# Patient Record
Sex: MALE | Race: WHITE | Employment: OTHER | URBAN - METROPOLITAN AREA
[De-identification: names, ages, dates, MRNs, and addresses within clinical notes are randomized per-mention and may not be internally consistent; named-entity substitution may affect disease eponyms.]

---

## 2018-06-20 PROBLEM — S32.010A CLOSED COMPRESSION FRACTURE OF L1 LUMBAR VERTEBRA: Status: ACTIVE | Noted: 2018-06-20

## 2018-06-25 PROBLEM — F10.10 ALCOHOL ABUSE: Chronic | Status: ACTIVE | Noted: 2018-06-25

## 2018-06-25 PROBLEM — Z72.0 TOBACCO ABUSE: Chronic | Status: ACTIVE | Noted: 2018-06-25

## 2018-06-25 PROBLEM — J44.9 COPD (CHRONIC OBSTRUCTIVE PULMONARY DISEASE) (HCC): Chronic | Status: ACTIVE | Noted: 2018-06-25

## 2018-06-25 PROBLEM — A49.02 MRSA INFECTION: Status: ACTIVE | Noted: 2018-06-25

## 2018-06-25 PROBLEM — D61.818 PANCYTOPENIA (HCC): Status: ACTIVE | Noted: 2018-06-25

## 2018-07-11 ENCOUNTER — HOSPITAL ENCOUNTER (OUTPATIENT)
Dept: CT IMAGING | Age: 68
Discharge: HOME | End: 2018-07-11

## 2018-07-11 DIAGNOSIS — R93.5 ABNORMAL US (ULTRASOUND) OF ABDOMEN: ICD-10-CM

## 2018-07-11 DIAGNOSIS — R93.5 ABNORMAL FINDINGS ON DIAGNOSTIC IMAGING OF OTHER ABDOMINAL REGIONS, INCLUDING RETROPERITONEUM: ICD-10-CM

## 2018-07-25 ENCOUNTER — OFFICE VISIT (OUTPATIENT)
Dept: FAMILY MEDICINE CLINIC | Age: 68
End: 2018-07-25

## 2018-07-25 VITALS
SYSTOLIC BLOOD PRESSURE: 116 MMHG | WEIGHT: 137 LBS | DIASTOLIC BLOOD PRESSURE: 72 MMHG | HEIGHT: 75 IN | BODY MASS INDEX: 17.03 KG/M2

## 2018-07-25 DIAGNOSIS — F10.10 ALCOHOL ABUSE: Primary | Chronic | ICD-10-CM

## 2018-07-25 DIAGNOSIS — J43.1 PANLOBULAR EMPHYSEMA (HCC): Chronic | ICD-10-CM

## 2018-07-25 DIAGNOSIS — S32.010A CLOSED COMPRESSION FRACTURE OF FIRST LUMBAR VERTEBRA, INITIAL ENCOUNTER: ICD-10-CM

## 2018-07-25 PROCEDURE — 99203 OFFICE O/P NEW LOW 30 MIN: CPT | Performed by: INTERNAL MEDICINE

## 2018-07-25 ASSESSMENT — ENCOUNTER SYMPTOMS
SHORTNESS OF BREATH: 0
SORE THROAT: 0
WHEEZING: 0
DIARRHEA: 0
SINUS PRESSURE: 0
SINUS PAIN: 0
RHINORRHEA: 0
BACK PAIN: 1
CONSTIPATION: 0
NAUSEA: 0
BLOOD IN STOOL: 0
VOMITING: 0
COUGH: 0
ABDOMINAL PAIN: 0
APNEA: 0

## 2018-07-25 ASSESSMENT — PATIENT HEALTH QUESTIONNAIRE - PHQ9
1. LITTLE INTEREST OR PLEASURE IN DOING THINGS: 0
2. FEELING DOWN, DEPRESSED OR HOPELESS: 0
SUM OF ALL RESPONSES TO PHQ9 QUESTIONS 1 & 2: 0
SUM OF ALL RESPONSES TO PHQ QUESTIONS 1-9: 0

## 2018-07-25 NOTE — PATIENT INSTRUCTIONS
Thank you for choosing [facility]. Please bring a current list of medications to every appointment. Please contact your pharmacy for any prescription refill(s) that you are requesting. Cigarette Smoking and Its Health Risks   GENERAL INFORMATION:   Smoking and your health: Cigarette smoking is the most preventable cause of illness and death in the [place]. A large number of Americans smoke cigarettes, and each year more than one million children and adults start smoking cigarettes. Many people die every year from illnesses caused by smoking. People who smoke die earlier than those who do not smoke. The risk of disease increases if you smoke a lot, inhale deeply, or have smoked many years. Why are cigarettes bad for you? Cigarettes are filled with poison that goes into the lungs when you inhale. Coughing, dizziness, and burning of the eyes, nose, and throat are early signs that smoking is harming you. Smoking increases your health risks if you have diabetes, high blood pressure, or high blood cholesterol. The long-term problems of smoking cigarettes are the following:   Cancer: Smoking increases your chances of getting cancer. Cigarette smoking may play a role in developing many kinds of cancer. Lung cancer is the most common kind of cancer caused by smoking. A smoker is at greater risk of getting cancer of the lips, mouth, throat, or voice box. Smokers also have a higher risk of getting esophagus, stomach, kidney, pancreas, cervix, bladder, and skin cancer. Heart and blood vessel disease: If you already have heart or blood vessel problems and smoke, you are at even greater risk of having continued or worse health problems. The nicotine in the tobacco causes an increase in your heart rate and blood pressure. The arteries (blood vessels) in your arms and legs tighten and narrow because of the nicotine in cigarette smoke.  Cigarette smoke increases blood clotting, and may damage the lining of your heart's arteries and other blood vessels. Carbon monoxide is a harmful gas that gets into the blood and decreases oxygen going to the heart and the body. Cigarette smoke contains this gas. Hardening of the arteries happens more often in smokers than in nonsmokers. This may make it more likely for you to have a stroke (blood clot in your brain). The more cigarettes you smoke, the greater your risk of a heart attack. Lung disease: The younger you are when you start smoking, the greater your risk of getting lung diseases. Many smokers have a cough which is caused by the chemicals in smoke. These chemicals harm the cilia (tiny hairs) that line the lungs and help remove dirt and waste products. Depending upon how much you smoke, your lungs become gray and \"dirty\" (they look like charcoal). Healthy lungs are pink. Chronic bronchitis is a serious lung infection which is often caused by smoking. Emphysema is a long-term lung disease that may be caused by smoking cigarettes. Cigarette smoking also makes asthma worse. You are at a higher risk of getting colds, pneumonia, and other lung infections if you smoke. Gastrointestinal disease: Cigarette smoking increases the amount of acid that is made by your stomach, and may cause a peptic ulcer. A peptic ulcer is an open sore in the stomach or duodenum (part of the intestine). You may also get gastroesophageal reflux from smoking. This is when you have a backflow of stomach acid into your esophagus (food tube). Other problems: The following are other problems that smoking may cause: Bad breath. Bad smell in your clothes, hair, and skin. Decreased ability to play sports or do physical activities because of breathing problems. Earlier than normal wrinkling of the skin, usually the face. Higher risk of bone fractures, such as hip, wrist, or spine. Higher risk of starting a fire.  This may happen if you fall asleep with a lit chance of getting cancer will be reduced as compared to a person who does not quit. As a former smoker, you will live longer than people who continue to smoke. Women who quit smoking before getting pregnant have a better chance of having a healthy baby. You will decrease the health risks of nonsmokers if you stop smoking. By stopping smoking you will also save money. What is the best way to stop smoking? A large percentage of people have tried to quit smoking at least once. Most people who try to quit smoking go through a series of stages. Following are the stages you may go through to stop smoking: Thinking about quitting. Deciding to quit on a certain day. Quitting smoking. Successfully staying an ex-smoker. You must be strong in order to quit smoking. When you decide to quit, you can get help from your caregiver or others. You will learn that there are many ways to stop smoking. Talk to your caregiver about the best method for you when you are ready to quit smoking. Ask your caregiver for more information about how to stop smoking. Call or write the following for more information about the risks of smoking. Smokefree. gov  Phone: 7-638.730.8102  Web Address: www.smokefree. gov  American Lung Association  [facility]. [address]  Phone: 3-5-919-3-608.895.8992  Phone: 0-3-920--301-1321  Web Address: [employer]. [address] [place] [address]  Phone: 3-831.217.7294  Web Address: http://[employer]/. gov   CARE AGREEMENT:   You have the right to help plan your care. To help with this plan, you must learn about your health condition and how it may be treated. You can then discuss treatment options with your caregivers. Work with them to decide what care may be used to treat you. You always have the right to refuse treatment. Copyright © 2009. [employer] All rights reserved.  Information is for End User's use only and may not be sold, redistributed or

## 2018-08-21 ENCOUNTER — TELEPHONE (OUTPATIENT)
Dept: FAMILY MEDICINE CLINIC | Age: 68
End: 2018-08-21

## 2018-08-21 RX ORDER — SODIUM CHLORIDE 1000 MG
1 TABLET, SOLUBLE MISCELLANEOUS DAILY
Qty: 30 TABLET | Refills: 5 | Status: SHIPPED | OUTPATIENT
Start: 2018-08-21 | End: 2019-03-02

## 2018-08-21 RX ORDER — MAGNESIUM OXIDE 400 MG/1
400 TABLET ORAL 2 TIMES DAILY
Qty: 60 TABLET | Refills: 5 | Status: SHIPPED | OUTPATIENT
Start: 2018-08-21 | End: 2019-03-02

## 2018-08-21 RX ORDER — UBIDECARENONE 75 MG
CAPSULE ORAL
Qty: 15 TABLET | Refills: 5 | Status: SHIPPED | OUTPATIENT
Start: 2018-08-21 | End: 2019-03-02

## 2018-08-21 RX ORDER — THIAMINE MONONITRATE (VIT B1) 100 MG
100 TABLET ORAL DAILY
Qty: 30 TABLET | Refills: 5 | Status: SHIPPED | OUTPATIENT
Start: 2018-08-21 | End: 2019-03-02

## 2018-08-28 PROCEDURE — G0180 MD CERTIFICATION HHA PATIENT: HCPCS | Performed by: INTERNAL MEDICINE

## 2018-08-30 ENCOUNTER — TELEPHONE (OUTPATIENT)
Dept: FAMILY MEDICINE CLINIC | Age: 68
End: 2018-08-30

## 2018-08-30 DIAGNOSIS — J43.1 PANLOBULAR EMPHYSEMA (HCC): Primary | Chronic | ICD-10-CM

## 2019-01-01 ENCOUNTER — APPOINTMENT (OUTPATIENT)
Dept: MRI IMAGING | Age: 69
End: 2019-01-01
Payer: MEDICARE

## 2019-01-01 ENCOUNTER — APPOINTMENT (OUTPATIENT)
Dept: GENERAL RADIOLOGY | Age: 69
End: 2019-01-01
Payer: MEDICARE

## 2019-01-01 ENCOUNTER — APPOINTMENT (OUTPATIENT)
Dept: GENERAL RADIOLOGY | Age: 69
DRG: 897 | End: 2019-01-01
Payer: MEDICARE

## 2019-01-01 ENCOUNTER — CARE COORDINATION (OUTPATIENT)
Dept: CASE MANAGEMENT | Age: 69
End: 2019-01-01

## 2019-01-01 ENCOUNTER — APPOINTMENT (OUTPATIENT)
Dept: CT IMAGING | Age: 69
End: 2019-01-01
Payer: MEDICARE

## 2019-01-01 ENCOUNTER — APPOINTMENT (OUTPATIENT)
Dept: CT IMAGING | Age: 69
DRG: 897 | End: 2019-01-01
Payer: MEDICARE

## 2019-01-01 ENCOUNTER — HOSPITAL ENCOUNTER (INPATIENT)
Age: 69
LOS: 6 days | Discharge: NURSING, REHAB OR LONG-TERM CARE | DRG: 897 | End: 2019-07-05
Attending: EMERGENCY MEDICINE | Admitting: SPECIALIST
Payer: MEDICARE

## 2019-01-01 ENCOUNTER — HOSPITAL ENCOUNTER (OUTPATIENT)
Age: 69
Setting detail: OBSERVATION
Discharge: HOME | End: 2019-12-23
Attending: EMERGENCY MEDICINE | Admitting: INTERNAL MEDICINE
Payer: MEDICARE

## 2019-01-01 VITALS
HEART RATE: 81 BPM | SYSTOLIC BLOOD PRESSURE: 115 MMHG | BODY MASS INDEX: 20.06 KG/M2 | HEIGHT: 74 IN | WEIGHT: 156.31 LBS | DIASTOLIC BLOOD PRESSURE: 75 MMHG | OXYGEN SATURATION: 96 % | TEMPERATURE: 98.4 F | RESPIRATION RATE: 16 BRPM

## 2019-01-01 VITALS
DIASTOLIC BLOOD PRESSURE: 86 MMHG | HEIGHT: 75 IN | HEART RATE: 81 BPM | TEMPERATURE: 98.9 F | BODY MASS INDEX: 19.19 KG/M2 | RESPIRATION RATE: 16 BRPM | SYSTOLIC BLOOD PRESSURE: 128 MMHG | WEIGHT: 154.32 LBS | OXYGEN SATURATION: 95 %

## 2019-01-01 DIAGNOSIS — E83.42 HYPOMAGNESEMIA: ICD-10-CM

## 2019-01-01 DIAGNOSIS — F10.10 ALCOHOL ABUSE: ICD-10-CM

## 2019-01-01 DIAGNOSIS — B37.2 CANDIDAL DERMATITIS: ICD-10-CM

## 2019-01-01 DIAGNOSIS — F10.931 ACUTE HYPERACTIVE ALCOHOL WITHDRAWAL DELIRIUM (HCC): ICD-10-CM

## 2019-01-01 DIAGNOSIS — R41.82 ALTERED MENTAL STATUS, UNSPECIFIED ALTERED MENTAL STATUS TYPE: Primary | ICD-10-CM

## 2019-01-01 DIAGNOSIS — E87.6 HYPOKALEMIA: ICD-10-CM

## 2019-01-01 LAB
A/G RATIO: 0.9 (ref 1.1–2.2)
A/G RATIO: 1 (ref 1.1–2.2)
A/G RATIO: 1 (ref 1.1–2.2)
ALBUMIN SERPL-MCNC: 3.4 G/DL (ref 3.4–5)
ALBUMIN SERPL-MCNC: 3.5 G/DL (ref 3.4–5)
ALBUMIN SERPL-MCNC: 3.7 G/DL (ref 3.4–5)
ALBUMIN SERPL-MCNC: 3.8 G/DL (ref 3.4–5)
ALP BLD-CCNC: 137 U/L (ref 40–129)
ALP BLD-CCNC: 138 U/L (ref 40–129)
ALP BLD-CCNC: 146 U/L (ref 40–129)
ALP BLD-CCNC: 149 U/L (ref 40–129)
ALT SERPL-CCNC: 15 U/L (ref 10–40)
ALT SERPL-CCNC: 17 U/L (ref 10–40)
ALT SERPL-CCNC: 24 U/L (ref 10–40)
ALT SERPL-CCNC: 31 U/L (ref 10–40)
AMMONIA: 26 UMOL/L (ref 16–60)
AMMONIA: 29 UMOL/L (ref 16–60)
AMPHETAMINE SCREEN, URINE: NORMAL
AMYLASE: 27 U/L (ref 25–115)
ANION GAP SERPL CALCULATED.3IONS-SCNC: 10 MMOL/L (ref 3–16)
ANION GAP SERPL CALCULATED.3IONS-SCNC: 10 MMOL/L (ref 3–16)
ANION GAP SERPL CALCULATED.3IONS-SCNC: 11 MMOL/L (ref 3–16)
ANION GAP SERPL CALCULATED.3IONS-SCNC: 13 MMOL/L (ref 3–16)
ANION GAP SERPL CALCULATED.3IONS-SCNC: 16 MMOL/L (ref 3–16)
ANION GAP SERPL CALCULATED.3IONS-SCNC: 18 MMOL/L (ref 3–16)
ANION GAP SERPL CALCULATED.3IONS-SCNC: 18 MMOL/L (ref 3–16)
APTT: 30.4 SEC (ref 24.2–36.2)
AST SERPL-CCNC: 29 U/L (ref 15–37)
AST SERPL-CCNC: 32 U/L (ref 15–37)
AST SERPL-CCNC: 51 U/L (ref 15–37)
AST SERPL-CCNC: 62 U/L (ref 15–37)
BARBITURATE SCREEN URINE: NORMAL
BASE EXCESS ARTERIAL: 3.7 MMOL/L (ref -3–3)
BASOPHILS ABSOLUTE: 0 K/UL (ref 0–0.2)
BASOPHILS RELATIVE PERCENT: 0.2 %
BASOPHILS RELATIVE PERCENT: 0.7 %
BASOPHILS RELATIVE PERCENT: 0.9 %
BENZODIAZEPINE SCREEN, URINE: NORMAL
BILIRUB SERPL-MCNC: 0.8 MG/DL (ref 0–1)
BILIRUB SERPL-MCNC: 1 MG/DL (ref 0–1)
BILIRUB SERPL-MCNC: 1.1 MG/DL (ref 0–1)
BILIRUB SERPL-MCNC: 1.2 MG/DL (ref 0–1)
BILIRUBIN DIRECT: 0.3 MG/DL (ref 0–0.3)
BILIRUBIN URINE: NEGATIVE
BILIRUBIN, INDIRECT: 0.5 MG/DL (ref 0–1)
BLOOD CULTURE, ROUTINE: NORMAL
BLOOD, URINE: NEGATIVE
BUN BLDV-MCNC: 10 MG/DL (ref 7–20)
BUN BLDV-MCNC: 15 MG/DL (ref 7–20)
BUN BLDV-MCNC: 5 MG/DL (ref 7–20)
BUN BLDV-MCNC: 5 MG/DL (ref 7–20)
BUN BLDV-MCNC: 6 MG/DL (ref 7–20)
BUN BLDV-MCNC: 7 MG/DL (ref 7–20)
BUN BLDV-MCNC: 8 MG/DL (ref 7–20)
CALCIUM SERPL-MCNC: 8.1 MG/DL (ref 8.3–10.6)
CALCIUM SERPL-MCNC: 8.2 MG/DL (ref 8.3–10.6)
CALCIUM SERPL-MCNC: 8.6 MG/DL (ref 8.3–10.6)
CALCIUM SERPL-MCNC: 8.6 MG/DL (ref 8.3–10.6)
CALCIUM SERPL-MCNC: 8.8 MG/DL (ref 8.3–10.6)
CALCIUM SERPL-MCNC: 8.8 MG/DL (ref 8.3–10.6)
CALCIUM SERPL-MCNC: 8.9 MG/DL (ref 8.3–10.6)
CANNABINOID SCREEN URINE: NORMAL
CARBOXYHEMOGLOBIN ARTERIAL: 2.8 % (ref 0–1.5)
CHLORIDE BLD-SCNC: 101 MMOL/L (ref 99–110)
CHLORIDE BLD-SCNC: 101 MMOL/L (ref 99–110)
CHLORIDE BLD-SCNC: 102 MMOL/L (ref 99–110)
CHLORIDE BLD-SCNC: 93 MMOL/L (ref 99–110)
CHLORIDE BLD-SCNC: 95 MMOL/L (ref 99–110)
CHLORIDE BLD-SCNC: 96 MMOL/L (ref 99–110)
CHLORIDE BLD-SCNC: 97 MMOL/L (ref 99–110)
CHLORIDE BLD-SCNC: 98 MMOL/L (ref 99–110)
CHLORIDE BLD-SCNC: 98 MMOL/L (ref 99–110)
CLARITY: CLEAR
CO2: 22 MMOL/L (ref 21–32)
CO2: 23 MMOL/L (ref 21–32)
CO2: 24 MMOL/L (ref 21–32)
CO2: 25 MMOL/L (ref 21–32)
CO2: 26 MMOL/L (ref 21–32)
COCAINE METABOLITE SCREEN URINE: NORMAL
COLOR: YELLOW
CREAT SERPL-MCNC: 0.5 MG/DL (ref 0.8–1.3)
CREAT SERPL-MCNC: 1 MG/DL (ref 0.8–1.3)
CREAT SERPL-MCNC: <0.5 MG/DL (ref 0.8–1.3)
CULTURE, BLOOD 2: NORMAL
EKG ATRIAL RATE: 105 BPM
EKG ATRIAL RATE: 119 BPM
EKG ATRIAL RATE: 96 BPM
EKG DIAGNOSIS: NORMAL
EKG P AXIS: 80 DEGREES
EKG P AXIS: 82 DEGREES
EKG P-R INTERVAL: 178 MS
EKG P-R INTERVAL: 182 MS
EKG Q-T INTERVAL: 304 MS
EKG Q-T INTERVAL: 368 MS
EKG Q-T INTERVAL: 384 MS
EKG QRS DURATION: 72 MS
EKG QRS DURATION: 96 MS
EKG QRS DURATION: 98 MS
EKG QTC CALCULATION (BAZETT): 398 MS
EKG QTC CALCULATION (BAZETT): 485 MS
EKG QTC CALCULATION (BAZETT): 486 MS
EKG R AXIS: -42 DEGREES
EKG R AXIS: -45 DEGREES
EKG R AXIS: 56 DEGREES
EKG T AXIS: 226 DEGREES
EKG T AXIS: 63 DEGREES
EKG T AXIS: 69 DEGREES
EKG VENTRICULAR RATE: 103 BPM
EKG VENTRICULAR RATE: 105 BPM
EKG VENTRICULAR RATE: 96 BPM
EOSINOPHILS ABSOLUTE: 0 K/UL (ref 0–0.6)
EOSINOPHILS RELATIVE PERCENT: 0.2 %
EOSINOPHILS RELATIVE PERCENT: 0.4 %
EOSINOPHILS RELATIVE PERCENT: 1.1 %
ETHANOL: NORMAL MG/DL (ref 0–0.08)
ETHANOL: NORMAL MG/DL (ref 0–0.08)
GFR AFRICAN AMERICAN: >60
GFR NON-AFRICAN AMERICAN: >60
GLOBULIN: 3.3 G/DL
GLOBULIN: 3.4 G/DL
GLOBULIN: 3.9 G/DL
GLUCOSE BLD-MCNC: 100 MG/DL (ref 70–99)
GLUCOSE BLD-MCNC: 107 MG/DL (ref 70–99)
GLUCOSE BLD-MCNC: 111 MG/DL (ref 70–99)
GLUCOSE BLD-MCNC: 121 MG/DL (ref 70–99)
GLUCOSE BLD-MCNC: 123 MG/DL (ref 70–99)
GLUCOSE BLD-MCNC: 91 MG/DL (ref 70–99)
GLUCOSE BLD-MCNC: 91 MG/DL (ref 70–99)
GLUCOSE BLD-MCNC: 94 MG/DL (ref 70–99)
GLUCOSE BLD-MCNC: 97 MG/DL (ref 70–99)
GLUCOSE URINE: NEGATIVE MG/DL
HCO3 ARTERIAL: 25.9 MMOL/L (ref 21–29)
HCT VFR BLD CALC: 35.7 % (ref 40.5–52.5)
HCT VFR BLD CALC: 39.1 % (ref 40.5–52.5)
HCT VFR BLD CALC: 39.6 % (ref 40.5–52.5)
HCT VFR BLD CALC: 40.7 % (ref 40.5–52.5)
HCT VFR BLD CALC: 40.8 % (ref 40.5–52.5)
HEMOGLOBIN, ART, EXTENDED: 13 G/DL (ref 13.5–17.5)
HEMOGLOBIN: 12.3 G/DL (ref 13.5–17.5)
HEMOGLOBIN: 13.3 G/DL (ref 13.5–17.5)
HEMOGLOBIN: 13.3 G/DL (ref 13.5–17.5)
HEMOGLOBIN: 13.7 G/DL (ref 13.5–17.5)
HEMOGLOBIN: 13.9 G/DL (ref 13.5–17.5)
INR BLD: 1.02 (ref 0.86–1.14)
KETONES, URINE: ABNORMAL MG/DL
LACTIC ACID, SEPSIS: 1 MMOL/L (ref 0.4–1.9)
LACTIC ACID, SEPSIS: 1.4 MMOL/L (ref 0.4–1.9)
LEUKOCYTE ESTERASE, URINE: NEGATIVE
LIPASE: 8 U/L (ref 13–60)
LIPASE: 9 U/L (ref 13–60)
LYMPHOCYTES ABSOLUTE: 0.8 K/UL (ref 1–5.1)
LYMPHOCYTES ABSOLUTE: 1.6 K/UL (ref 1–5.1)
LYMPHOCYTES ABSOLUTE: 1.6 K/UL (ref 1–5.1)
LYMPHOCYTES RELATIVE PERCENT: 23.1 %
LYMPHOCYTES RELATIVE PERCENT: 26 %
LYMPHOCYTES RELATIVE PERCENT: 44.5 %
Lab: NORMAL
MAGNESIUM: 1.2 MG/DL (ref 1.8–2.4)
MAGNESIUM: 1.4 MG/DL (ref 1.8–2.4)
MAGNESIUM: 1.6 MG/DL (ref 1.8–2.4)
MCH RBC QN AUTO: 34.4 PG (ref 26–34)
MCH RBC QN AUTO: 34.6 PG (ref 26–34)
MCH RBC QN AUTO: 34.6 PG (ref 26–34)
MCH RBC QN AUTO: 34.7 PG (ref 26–34)
MCH RBC QN AUTO: 34.9 PG (ref 26–34)
MCHC RBC AUTO-ENTMCNC: 33.7 G/DL (ref 31–36)
MCHC RBC AUTO-ENTMCNC: 33.7 G/DL (ref 31–36)
MCHC RBC AUTO-ENTMCNC: 33.9 G/DL (ref 31–36)
MCHC RBC AUTO-ENTMCNC: 34.1 G/DL (ref 31–36)
MCHC RBC AUTO-ENTMCNC: 34.4 G/DL (ref 31–36)
MCV RBC AUTO: 100.9 FL (ref 80–100)
MCV RBC AUTO: 101 FL (ref 80–100)
MCV RBC AUTO: 102.5 FL (ref 80–100)
MCV RBC AUTO: 102.7 FL (ref 80–100)
MCV RBC AUTO: 102.7 FL (ref 80–100)
METHADONE SCREEN, URINE: NORMAL
METHEMOGLOBIN ARTERIAL: 0.7 %
MICROSCOPIC EXAMINATION: ABNORMAL
MONOCYTES ABSOLUTE: 0.4 K/UL (ref 0–1.3)
MONOCYTES ABSOLUTE: 0.5 K/UL (ref 0–1.3)
MONOCYTES ABSOLUTE: 0.6 K/UL (ref 0–1.3)
MONOCYTES RELATIVE PERCENT: 11 %
MONOCYTES RELATIVE PERCENT: 14.1 %
MONOCYTES RELATIVE PERCENT: 9.6 %
NEUTROPHILS ABSOLUTE: 1.4 K/UL (ref 1.7–7.7)
NEUTROPHILS ABSOLUTE: 2.2 K/UL (ref 1.7–7.7)
NEUTROPHILS ABSOLUTE: 4 K/UL (ref 1.7–7.7)
NEUTROPHILS RELATIVE PERCENT: 39.6 %
NEUTROPHILS RELATIVE PERCENT: 63.8 %
NEUTROPHILS RELATIVE PERCENT: 64.8 %
NITRITE, URINE: NEGATIVE
O2 CONTENT ARTERIAL: 17 ML/DL
O2 SAT, ARTERIAL: 94.4 %
O2 THERAPY: ABNORMAL
OPIATE SCREEN URINE: NORMAL
OXYCODONE URINE: NORMAL
PCO2 ARTERIAL: 32.1 MMHG (ref 35–45)
PDW BLD-RTO: 13.8 % (ref 12.4–15.4)
PDW BLD-RTO: 14.2 % (ref 12.4–15.4)
PDW BLD-RTO: 14.3 % (ref 12.4–15.4)
PDW BLD-RTO: 15.4 % (ref 12.4–15.4)
PDW BLD-RTO: 15.4 % (ref 12.4–15.4)
PH ARTERIAL: 7.52 (ref 7.35–7.45)
PH UA: 8.5
PH UA: 8.5 (ref 5–8)
PHENCYCLIDINE SCREEN URINE: NORMAL
PHOSPHORUS: 2.2 MG/DL (ref 2.5–4.9)
PHOSPHORUS: 3 MG/DL (ref 2.5–4.9)
PLATELET # BLD: 47 K/UL (ref 135–450)
PLATELET # BLD: 57 K/UL (ref 135–450)
PLATELET # BLD: 63 K/UL (ref 135–450)
PLATELET # BLD: 68 K/UL (ref 135–450)
PLATELET # BLD: 78 K/UL (ref 135–450)
PMV BLD AUTO: 9.6 FL (ref 5–10.5)
PMV BLD AUTO: 9.7 FL (ref 5–10.5)
PMV BLD AUTO: 9.8 FL (ref 5–10.5)
PMV BLD AUTO: 9.8 FL (ref 5–10.5)
PMV BLD AUTO: 9.9 FL (ref 5–10.5)
PO2 ARTERIAL: 64.5 MMHG (ref 75–108)
POTASSIUM REFLEX MAGNESIUM: 2.6 MMOL/L (ref 3.5–5.1)
POTASSIUM REFLEX MAGNESIUM: 3.1 MMOL/L (ref 3.5–5.1)
POTASSIUM SERPL-SCNC: 3.4 MMOL/L (ref 3.5–5.1)
POTASSIUM SERPL-SCNC: 3.5 MMOL/L (ref 3.5–5.1)
POTASSIUM SERPL-SCNC: 3.5 MMOL/L (ref 3.5–5.1)
POTASSIUM SERPL-SCNC: 3.6 MMOL/L (ref 3.5–5.1)
POTASSIUM SERPL-SCNC: 3.6 MMOL/L (ref 3.5–5.1)
POTASSIUM SERPL-SCNC: 3.8 MMOL/L (ref 3.5–5.1)
POTASSIUM SERPL-SCNC: 4 MMOL/L (ref 3.5–5.1)
POTASSIUM SERPL-SCNC: 4.8 MMOL/L (ref 3.5–5.1)
PROPOXYPHENE SCREEN: NORMAL
PROTEIN UA: NEGATIVE MG/DL
PROTHROMBIN TIME: 11.8 SEC (ref 10–13.2)
RBC # BLD: 3.53 M/UL (ref 4.2–5.9)
RBC # BLD: 3.81 M/UL (ref 4.2–5.9)
RBC # BLD: 3.86 M/UL (ref 4.2–5.9)
RBC # BLD: 3.97 M/UL (ref 4.2–5.9)
RBC # BLD: 4.04 M/UL (ref 4.2–5.9)
REASON FOR REJECTION: NORMAL
REJECTED TEST: NORMAL
SODIUM BLD-SCNC: 132 MMOL/L (ref 136–145)
SODIUM BLD-SCNC: 133 MMOL/L (ref 136–145)
SODIUM BLD-SCNC: 134 MMOL/L (ref 136–145)
SODIUM BLD-SCNC: 135 MMOL/L (ref 136–145)
SODIUM BLD-SCNC: 135 MMOL/L (ref 136–145)
SODIUM BLD-SCNC: 136 MMOL/L (ref 136–145)
SODIUM BLD-SCNC: 136 MMOL/L (ref 136–145)
SODIUM BLD-SCNC: 137 MMOL/L (ref 136–145)
SODIUM BLD-SCNC: 137 MMOL/L (ref 136–145)
SPECIFIC GRAVITY UA: >1.03 (ref 1–1.03)
TCO2 ARTERIAL: 26.9 MMOL/L
TOTAL PROTEIN: 6.7 G/DL (ref 6.4–8.2)
TOTAL PROTEIN: 6.9 G/DL (ref 6.4–8.2)
TOTAL PROTEIN: 7.4 G/DL (ref 6.4–8.2)
TOTAL PROTEIN: 7.6 G/DL (ref 6.4–8.2)
TROPONIN: <0.01 NG/ML
URINE REFLEX TO CULTURE: ABNORMAL
URINE TYPE: ABNORMAL
UROBILINOGEN, URINE: 0.2 E.U./DL
WBC # BLD: 2.7 K/UL (ref 4–11)
WBC # BLD: 3.5 K/UL (ref 4–11)
WBC # BLD: 3.5 K/UL (ref 4–11)
WBC # BLD: 3.7 K/UL (ref 4–11)
WBC # BLD: 6.2 K/UL (ref 4–11)

## 2019-01-01 PROCEDURE — 80048 BASIC METABOLIC PNL TOTAL CA: CPT

## 2019-01-01 PROCEDURE — 36415 COLL VENOUS BLD VENIPUNCTURE: CPT

## 2019-01-01 PROCEDURE — G0378 HOSPITAL OBSERVATION PER HR: HCPCS

## 2019-01-01 PROCEDURE — 90670 PCV13 VACCINE IM: CPT | Performed by: SPECIALIST

## 2019-01-01 PROCEDURE — 6370000000 HC RX 637 (ALT 250 FOR IP): Performed by: SPECIALIST

## 2019-01-01 PROCEDURE — 83605 ASSAY OF LACTIC ACID: CPT

## 2019-01-01 PROCEDURE — 99285 EMERGENCY DEPT VISIT HI MDM: CPT

## 2019-01-01 PROCEDURE — 96366 THER/PROPH/DIAG IV INF ADDON: CPT

## 2019-01-01 PROCEDURE — G0009 ADMIN PNEUMOCOCCAL VACCINE: HCPCS | Performed by: SPECIALIST

## 2019-01-01 PROCEDURE — 70450 CT HEAD/BRAIN W/O DYE: CPT

## 2019-01-01 PROCEDURE — 85027 COMPLETE CBC AUTOMATED: CPT

## 2019-01-01 PROCEDURE — 96368 THER/DIAG CONCURRENT INF: CPT

## 2019-01-01 PROCEDURE — 2580000003 HC RX 258: Performed by: SPECIALIST

## 2019-01-01 PROCEDURE — 97166 OT EVAL MOD COMPLEX 45 MIN: CPT

## 2019-01-01 PROCEDURE — 93010 ELECTROCARDIOGRAM REPORT: CPT | Performed by: INTERNAL MEDICINE

## 2019-01-01 PROCEDURE — 94760 N-INVAS EAR/PLS OXIMETRY 1: CPT

## 2019-01-01 PROCEDURE — 97530 THERAPEUTIC ACTIVITIES: CPT

## 2019-01-01 PROCEDURE — 71045 X-RAY EXAM CHEST 1 VIEW: CPT

## 2019-01-01 PROCEDURE — 83735 ASSAY OF MAGNESIUM: CPT

## 2019-01-01 PROCEDURE — 97535 SELF CARE MNGMENT TRAINING: CPT

## 2019-01-01 PROCEDURE — 97110 THERAPEUTIC EXERCISES: CPT

## 2019-01-01 PROCEDURE — 82140 ASSAY OF AMMONIA: CPT

## 2019-01-01 PROCEDURE — 84100 ASSAY OF PHOSPHORUS: CPT

## 2019-01-01 PROCEDURE — 85025 COMPLETE CBC W/AUTO DIFF WBC: CPT

## 2019-01-01 PROCEDURE — 2060000000 HC ICU INTERMEDIATE R&B

## 2019-01-01 PROCEDURE — 99238 HOSP IP/OBS DSCHRG MGMT 30/<: CPT | Performed by: INTERNAL MEDICINE

## 2019-01-01 PROCEDURE — 97162 PT EVAL MOD COMPLEX 30 MIN: CPT | Performed by: PHYSICAL THERAPIST

## 2019-01-01 PROCEDURE — 90686 IIV4 VACC NO PRSV 0.5 ML IM: CPT | Performed by: INTERNAL MEDICINE

## 2019-01-01 PROCEDURE — 82150 ASSAY OF AMYLASE: CPT

## 2019-01-01 PROCEDURE — 2500000003 HC RX 250 WO HCPCS: Performed by: EMERGENCY MEDICINE

## 2019-01-01 PROCEDURE — 83690 ASSAY OF LIPASE: CPT

## 2019-01-01 PROCEDURE — 80076 HEPATIC FUNCTION PANEL: CPT

## 2019-01-01 PROCEDURE — 6360000002 HC RX W HCPCS: Performed by: NURSE PRACTITIONER

## 2019-01-01 PROCEDURE — 93005 ELECTROCARDIOGRAM TRACING: CPT | Performed by: NURSE PRACTITIONER

## 2019-01-01 PROCEDURE — 1200000000 HC SEMI PRIVATE

## 2019-01-01 PROCEDURE — 80053 COMPREHEN METABOLIC PANEL: CPT

## 2019-01-01 PROCEDURE — 84425 ASSAY OF VITAMIN B-1: CPT

## 2019-01-01 PROCEDURE — 70551 MRI BRAIN STEM W/O DYE: CPT

## 2019-01-01 PROCEDURE — 6360000002 HC RX W HCPCS: Performed by: SPECIALIST

## 2019-01-01 PROCEDURE — 2580000003 HC RX 258: Performed by: NURSE PRACTITIONER

## 2019-01-01 PROCEDURE — 97530 THERAPEUTIC ACTIVITIES: CPT | Performed by: PHYSICAL THERAPIST

## 2019-01-01 PROCEDURE — 85610 PROTHROMBIN TIME: CPT

## 2019-01-01 PROCEDURE — 6360000004 HC RX CONTRAST MEDICATION: Performed by: EMERGENCY MEDICINE

## 2019-01-01 PROCEDURE — 95819 EEG AWAKE AND ASLEEP: CPT

## 2019-01-01 PROCEDURE — 6360000002 HC RX W HCPCS: Performed by: INTERNAL MEDICINE

## 2019-01-01 PROCEDURE — 80307 DRUG TEST PRSMV CHEM ANLYZR: CPT

## 2019-01-01 PROCEDURE — 82803 BLOOD GASES ANY COMBINATION: CPT

## 2019-01-01 PROCEDURE — 85730 THROMBOPLASTIN TIME PARTIAL: CPT

## 2019-01-01 PROCEDURE — 84132 ASSAY OF SERUM POTASSIUM: CPT

## 2019-01-01 PROCEDURE — 6370000000 HC RX 637 (ALT 250 FOR IP): Performed by: EMERGENCY MEDICINE

## 2019-01-01 PROCEDURE — G0480 DRUG TEST DEF 1-7 CLASSES: HCPCS

## 2019-01-01 PROCEDURE — 99232 SBSQ HOSP IP/OBS MODERATE 35: CPT | Performed by: INTERNAL MEDICINE

## 2019-01-01 PROCEDURE — 70496 CT ANGIOGRAPHY HEAD: CPT

## 2019-01-01 PROCEDURE — 94761 N-INVAS EAR/PLS OXIMETRY MLT: CPT

## 2019-01-01 PROCEDURE — 81003 URINALYSIS AUTO W/O SCOPE: CPT

## 2019-01-01 PROCEDURE — 72125 CT NECK SPINE W/O DYE: CPT

## 2019-01-01 PROCEDURE — G0008 ADMIN INFLUENZA VIRUS VAC: HCPCS | Performed by: INTERNAL MEDICINE

## 2019-01-01 PROCEDURE — 84484 ASSAY OF TROPONIN QUANT: CPT

## 2019-01-01 PROCEDURE — 6370000000 HC RX 637 (ALT 250 FOR IP): Performed by: INTERNAL MEDICINE

## 2019-01-01 PROCEDURE — 99233 SBSQ HOSP IP/OBS HIGH 50: CPT | Performed by: INTERNAL MEDICINE

## 2019-01-01 PROCEDURE — 2580000003 HC RX 258: Performed by: EMERGENCY MEDICINE

## 2019-01-01 PROCEDURE — 2580000003 HC RX 258: Performed by: INTERNAL MEDICINE

## 2019-01-01 PROCEDURE — 96365 THER/PROPH/DIAG IV INF INIT: CPT

## 2019-01-01 PROCEDURE — 99222 1ST HOSP IP/OBS MODERATE 55: CPT | Performed by: INTERNAL MEDICINE

## 2019-01-01 PROCEDURE — 36600 WITHDRAWAL OF ARTERIAL BLOOD: CPT

## 2019-01-01 PROCEDURE — 93005 ELECTROCARDIOGRAM TRACING: CPT | Performed by: EMERGENCY MEDICINE

## 2019-01-01 PROCEDURE — 6360000002 HC RX W HCPCS: Performed by: EMERGENCY MEDICINE

## 2019-01-01 PROCEDURE — 97161 PT EVAL LOW COMPLEX 20 MIN: CPT

## 2019-01-01 PROCEDURE — 71046 X-RAY EXAM CHEST 2 VIEWS: CPT

## 2019-01-01 PROCEDURE — 97116 GAIT TRAINING THERAPY: CPT | Performed by: PHYSICAL THERAPIST

## 2019-01-01 PROCEDURE — 97116 GAIT TRAINING THERAPY: CPT

## 2019-01-01 PROCEDURE — 87040 BLOOD CULTURE FOR BACTERIA: CPT

## 2019-01-01 PROCEDURE — 2500000003 HC RX 250 WO HCPCS: Performed by: NURSE PRACTITIONER

## 2019-01-01 RX ORDER — 0.9 % SODIUM CHLORIDE 0.9 %
1000 INTRAVENOUS SOLUTION INTRAVENOUS ONCE
Status: COMPLETED | OUTPATIENT
Start: 2019-01-01 | End: 2019-01-01

## 2019-01-01 RX ORDER — POTASSIUM CHLORIDE 7.45 MG/ML
10 INJECTION INTRAVENOUS PRN
Status: DISCONTINUED | OUTPATIENT
Start: 2019-01-01 | End: 2019-01-01 | Stop reason: HOSPADM

## 2019-01-01 RX ORDER — LORAZEPAM 2 MG/ML
1 INJECTION INTRAMUSCULAR
Status: DISCONTINUED | OUTPATIENT
Start: 2019-01-01 | End: 2019-01-01 | Stop reason: HOSPADM

## 2019-01-01 RX ORDER — POTASSIUM CHLORIDE 20 MEQ/1
40 TABLET, EXTENDED RELEASE ORAL PRN
Status: DISCONTINUED | OUTPATIENT
Start: 2019-01-01 | End: 2019-01-01 | Stop reason: HOSPADM

## 2019-01-01 RX ORDER — LORAZEPAM 1 MG/1
3 TABLET ORAL
Status: DISCONTINUED | OUTPATIENT
Start: 2019-01-01 | End: 2019-01-01 | Stop reason: HOSPADM

## 2019-01-01 RX ORDER — FOLIC ACID 1 MG/1
1 TABLET ORAL DAILY
Status: DISCONTINUED | OUTPATIENT
Start: 2019-01-01 | End: 2019-01-01 | Stop reason: HOSPADM

## 2019-01-01 RX ORDER — SODIUM CHLORIDE 0.9 % (FLUSH) 0.9 %
10 SYRINGE (ML) INJECTION PRN
Status: DISCONTINUED | OUTPATIENT
Start: 2019-01-01 | End: 2019-01-01

## 2019-01-01 RX ORDER — NICOTINE 21 MG/24HR
1 PATCH, TRANSDERMAL 24 HOURS TRANSDERMAL DAILY
Status: DISCONTINUED | OUTPATIENT
Start: 2019-01-01 | End: 2019-01-01 | Stop reason: HOSPADM

## 2019-01-01 RX ORDER — THIAMINE MONONITRATE (VIT B1) 100 MG
100 TABLET ORAL DAILY
Status: DISCONTINUED | OUTPATIENT
Start: 2019-01-01 | End: 2019-01-01 | Stop reason: HOSPADM

## 2019-01-01 RX ORDER — SODIUM CHLORIDE 9 MG/ML
INJECTION, SOLUTION INTRAVENOUS CONTINUOUS
Status: DISCONTINUED | OUTPATIENT
Start: 2019-01-01 | End: 2019-01-01

## 2019-01-01 RX ORDER — MAGNESIUM SULFATE IN WATER 40 MG/ML
2 INJECTION, SOLUTION INTRAVENOUS ONCE
Status: COMPLETED | OUTPATIENT
Start: 2019-01-01 | End: 2019-01-01

## 2019-01-01 RX ORDER — POTASSIUM CHLORIDE 7.45 MG/ML
10 INJECTION INTRAVENOUS PRN
Status: DISCONTINUED | OUTPATIENT
Start: 2019-01-01 | End: 2019-01-01

## 2019-01-01 RX ORDER — M-VIT,TX,IRON,MINS/CALC/FOLIC 27MG-0.4MG
1 TABLET ORAL DAILY
Status: DISCONTINUED | OUTPATIENT
Start: 2019-01-01 | End: 2019-01-01 | Stop reason: HOSPADM

## 2019-01-01 RX ORDER — DEXTROSE AND SODIUM CHLORIDE 5; .45 G/100ML; G/100ML
INJECTION, SOLUTION INTRAVENOUS CONTINUOUS
Status: DISCONTINUED | OUTPATIENT
Start: 2019-01-01 | End: 2019-01-01

## 2019-01-01 RX ORDER — LORAZEPAM 1 MG/1
2 TABLET ORAL
Status: DISCONTINUED | OUTPATIENT
Start: 2019-01-01 | End: 2019-01-01 | Stop reason: HOSPADM

## 2019-01-01 RX ORDER — LORAZEPAM 1 MG/1
1 TABLET ORAL
Status: DISCONTINUED | OUTPATIENT
Start: 2019-01-01 | End: 2019-01-01 | Stop reason: HOSPADM

## 2019-01-01 RX ORDER — PANTOPRAZOLE SODIUM 40 MG/1
40 TABLET, DELAYED RELEASE ORAL
Status: DISCONTINUED | OUTPATIENT
Start: 2019-01-01 | End: 2019-01-01 | Stop reason: HOSPADM

## 2019-01-01 RX ORDER — LORAZEPAM 2 MG/ML
2 INJECTION INTRAMUSCULAR
Status: DISCONTINUED | OUTPATIENT
Start: 2019-01-01 | End: 2019-01-01 | Stop reason: HOSPADM

## 2019-01-01 RX ORDER — ONDANSETRON 2 MG/ML
4 INJECTION INTRAMUSCULAR; INTRAVENOUS EVERY 6 HOURS PRN
Status: DISCONTINUED | OUTPATIENT
Start: 2019-01-01 | End: 2019-01-01

## 2019-01-01 RX ORDER — SODIUM CHLORIDE 0.9 % (FLUSH) 0.9 %
10 SYRINGE (ML) INJECTION PRN
Status: DISCONTINUED | OUTPATIENT
Start: 2019-01-01 | End: 2019-01-01 | Stop reason: HOSPADM

## 2019-01-01 RX ORDER — HYDROXYZINE HYDROCHLORIDE 25 MG/1
25 TABLET, FILM COATED ORAL EVERY 8 HOURS PRN
Qty: 15 TABLET | Refills: 0 | Status: SHIPPED | OUTPATIENT
Start: 2019-01-01 | End: 2019-01-01

## 2019-01-01 RX ORDER — THIAMINE MONONITRATE (VIT B1) 100 MG
100 TABLET ORAL DAILY
Status: DISCONTINUED | OUTPATIENT
Start: 2019-01-01 | End: 2019-01-01

## 2019-01-01 RX ORDER — SODIUM CHLORIDE 0.9 % (FLUSH) 0.9 %
10 SYRINGE (ML) INJECTION EVERY 12 HOURS SCHEDULED
Status: DISCONTINUED | OUTPATIENT
Start: 2019-01-01 | End: 2019-01-01 | Stop reason: HOSPADM

## 2019-01-01 RX ORDER — MAGNESIUM SULFATE HEPTAHYDRATE 500 MG/ML
2 INJECTION, SOLUTION INTRAMUSCULAR; INTRAVENOUS ONCE
Status: DISCONTINUED | OUTPATIENT
Start: 2019-01-01 | End: 2019-01-01

## 2019-01-01 RX ORDER — M-VIT,TX,IRON,MINS/CALC/FOLIC 27MG-0.4MG
1 TABLET ORAL DAILY
Status: DISCONTINUED | OUTPATIENT
Start: 2019-01-01 | End: 2019-01-01

## 2019-01-01 RX ORDER — CALCIUM CARBONATE/VITAMIN D3 500-10/5ML
LIQUID (ML) ORAL
Qty: 60 CAPSULE | Refills: 2 | Status: ON HOLD | OUTPATIENT
Start: 2019-01-01 | End: 2020-01-01 | Stop reason: SDUPTHER

## 2019-01-01 RX ORDER — LORAZEPAM 2 MG/ML
4 INJECTION INTRAMUSCULAR
Status: DISCONTINUED | OUTPATIENT
Start: 2019-01-01 | End: 2019-01-01 | Stop reason: HOSPADM

## 2019-01-01 RX ORDER — POTASSIUM CHLORIDE 1.5 G/1.77G
40 POWDER, FOR SOLUTION ORAL ONCE
Status: DISCONTINUED | OUTPATIENT
Start: 2019-01-01 | End: 2019-01-01 | Stop reason: SDUPTHER

## 2019-01-01 RX ORDER — POTASSIUM CHLORIDE 20 MEQ/1
20 TABLET, EXTENDED RELEASE ORAL
Status: DISCONTINUED | OUTPATIENT
Start: 2019-01-01 | End: 2019-01-01 | Stop reason: HOSPADM

## 2019-01-01 RX ORDER — ACETAMINOPHEN 325 MG/1
650 TABLET ORAL EVERY 4 HOURS PRN
Status: DISCONTINUED | OUTPATIENT
Start: 2019-01-01 | End: 2019-01-01 | Stop reason: HOSPADM

## 2019-01-01 RX ORDER — LORAZEPAM 2 MG/ML
3 INJECTION INTRAMUSCULAR
Status: DISCONTINUED | OUTPATIENT
Start: 2019-01-01 | End: 2019-01-01 | Stop reason: HOSPADM

## 2019-01-01 RX ORDER — METOPROLOL SUCCINATE 50 MG/1
50 TABLET, EXTENDED RELEASE ORAL DAILY
Status: DISCONTINUED | OUTPATIENT
Start: 2019-01-01 | End: 2019-01-01 | Stop reason: HOSPADM

## 2019-01-01 RX ORDER — IBUPROFEN 400 MG/1
400 TABLET ORAL ONCE
Status: DISCONTINUED | OUTPATIENT
Start: 2019-01-01 | End: 2019-01-01

## 2019-01-01 RX ORDER — LORAZEPAM 1 MG/1
4 TABLET ORAL
Status: DISCONTINUED | OUTPATIENT
Start: 2019-01-01 | End: 2019-01-01 | Stop reason: HOSPADM

## 2019-01-01 RX ORDER — ONDANSETRON 2 MG/ML
4 INJECTION INTRAMUSCULAR; INTRAVENOUS EVERY 6 HOURS PRN
Status: DISCONTINUED | OUTPATIENT
Start: 2019-01-01 | End: 2019-01-01 | Stop reason: HOSPADM

## 2019-01-01 RX ORDER — ALBUTEROL SULFATE 90 UG/1
2 AEROSOL, METERED RESPIRATORY (INHALATION) 4 TIMES DAILY PRN
Status: DISCONTINUED | OUTPATIENT
Start: 2019-01-01 | End: 2019-01-01 | Stop reason: HOSPADM

## 2019-01-01 RX ORDER — ACETAMINOPHEN 325 MG/1
650 TABLET ORAL EVERY 8 HOURS PRN
Status: DISCONTINUED | OUTPATIENT
Start: 2019-01-01 | End: 2019-01-01 | Stop reason: HOSPADM

## 2019-01-01 RX ORDER — FOLIC ACID 1 MG/1
1 TABLET ORAL DAILY
Qty: 30 TABLET | Refills: 3 | Status: SHIPPED | OUTPATIENT
Start: 2019-01-01 | End: 2019-01-01

## 2019-01-01 RX ORDER — MAGNESIUM SULFATE 1 G/100ML
1 INJECTION INTRAVENOUS ONCE
Status: COMPLETED | OUTPATIENT
Start: 2019-01-01 | End: 2019-01-01

## 2019-01-01 RX ORDER — POTASSIUM CHLORIDE 20 MEQ/1
20 TABLET, EXTENDED RELEASE ORAL DAILY
Qty: 4 TABLET | Refills: 0 | Status: SHIPPED | OUTPATIENT
Start: 2019-01-01 | End: 2019-01-01

## 2019-01-01 RX ORDER — LORAZEPAM 2 MG/ML
1 INJECTION INTRAMUSCULAR ONCE
Status: DISCONTINUED | OUTPATIENT
Start: 2019-01-01 | End: 2019-01-01 | Stop reason: HOSPADM

## 2019-01-01 RX ORDER — SODIUM CHLORIDE 0.9 % (FLUSH) 0.9 %
10 SYRINGE (ML) INJECTION EVERY 12 HOURS SCHEDULED
Status: DISCONTINUED | OUTPATIENT
Start: 2019-01-01 | End: 2019-01-01

## 2019-01-01 RX ADMIN — SODIUM CHLORIDE, PRESERVATIVE FREE 10 ML: 5 INJECTION INTRAVENOUS at 09:54

## 2019-01-01 RX ADMIN — METOPROLOL SUCCINATE 50 MG: 50 TABLET, EXTENDED RELEASE ORAL at 09:23

## 2019-01-01 RX ADMIN — METOPROLOL SUCCINATE 50 MG: 50 TABLET, EXTENDED RELEASE ORAL at 08:13

## 2019-01-01 RX ADMIN — FOLIC ACID: 5 INJECTION, SOLUTION INTRAMUSCULAR; INTRAVENOUS; SUBCUTANEOUS at 18:39

## 2019-01-01 RX ADMIN — FOLIC ACID 1 MG: 1 TABLET ORAL at 09:54

## 2019-01-01 RX ADMIN — POTASSIUM CHLORIDE 10 MEQ: 7.46 INJECTION, SOLUTION INTRAVENOUS at 15:23

## 2019-01-01 RX ADMIN — DEXTROSE AND SODIUM CHLORIDE: 5; 450 INJECTION, SOLUTION INTRAVENOUS at 01:17

## 2019-01-01 RX ADMIN — DEXTROSE AND SODIUM CHLORIDE: 5; 450 INJECTION, SOLUTION INTRAVENOUS at 14:23

## 2019-01-01 RX ADMIN — MAGNESIUM SULFATE HEPTAHYDRATE 2 G: 40 INJECTION, SOLUTION INTRAVENOUS at 18:09

## 2019-01-01 RX ADMIN — FOLIC ACID 1 MG: 1 TABLET ORAL at 09:23

## 2019-01-01 RX ADMIN — PANTOPRAZOLE SODIUM 40 MG: 40 TABLET, DELAYED RELEASE ORAL at 06:35

## 2019-01-01 RX ADMIN — MAGNESIUM SULFATE HEPTAHYDRATE 1 G: 1 INJECTION, SOLUTION INTRAVENOUS at 17:55

## 2019-01-01 RX ADMIN — POTASSIUM CHLORIDE 20 MEQ: 20 TABLET, EXTENDED RELEASE ORAL at 07:43

## 2019-01-01 RX ADMIN — POTASSIUM CHLORIDE 10 MEQ: 7.46 INJECTION, SOLUTION INTRAVENOUS at 12:07

## 2019-01-01 RX ADMIN — SODIUM CHLORIDE, PRESERVATIVE FREE 10 ML: 5 INJECTION INTRAVENOUS at 22:15

## 2019-01-01 RX ADMIN — Medication 10 ML: at 08:13

## 2019-01-01 RX ADMIN — POTASSIUM CHLORIDE 20 MEQ: 20 TABLET, EXTENDED RELEASE ORAL at 09:54

## 2019-01-01 RX ADMIN — SODIUM CHLORIDE: 9 INJECTION, SOLUTION INTRAVENOUS at 08:15

## 2019-01-01 RX ADMIN — DEXTROSE AND SODIUM CHLORIDE: 5; 450 INJECTION, SOLUTION INTRAVENOUS at 14:08

## 2019-01-01 RX ADMIN — IOPAMIDOL 75 ML: 755 INJECTION, SOLUTION INTRAVENOUS at 15:57

## 2019-01-01 RX ADMIN — METOPROLOL SUCCINATE 50 MG: 50 TABLET, FILM COATED, EXTENDED RELEASE ORAL at 07:44

## 2019-01-01 RX ADMIN — METOPROLOL SUCCINATE 50 MG: 50 TABLET, FILM COATED, EXTENDED RELEASE ORAL at 09:54

## 2019-01-01 RX ADMIN — SODIUM CHLORIDE, PRESERVATIVE FREE 10 ML: 5 INJECTION INTRAVENOUS at 20:30

## 2019-01-01 RX ADMIN — METOPROLOL SUCCINATE 50 MG: 50 TABLET, FILM COATED, EXTENDED RELEASE ORAL at 08:05

## 2019-01-01 RX ADMIN — POTASSIUM CHLORIDE 10 MEQ: 7.46 INJECTION, SOLUTION INTRAVENOUS at 14:32

## 2019-01-01 RX ADMIN — ENOXAPARIN SODIUM 40 MG: 40 INJECTION SUBCUTANEOUS at 11:13

## 2019-01-01 RX ADMIN — POTASSIUM CHLORIDE 20 MEQ: 20 TABLET, EXTENDED RELEASE ORAL at 08:56

## 2019-01-01 RX ADMIN — SODIUM CHLORIDE, PRESERVATIVE FREE 10 ML: 5 INJECTION INTRAVENOUS at 22:16

## 2019-01-01 RX ADMIN — FOLIC ACID 1 MG: 1 TABLET ORAL at 08:56

## 2019-01-01 RX ADMIN — SODIUM CHLORIDE, PRESERVATIVE FREE 10 ML: 5 INJECTION INTRAVENOUS at 19:40

## 2019-01-01 RX ADMIN — SODIUM CHLORIDE, PRESERVATIVE FREE 10 ML: 5 INJECTION INTRAVENOUS at 22:45

## 2019-01-01 RX ADMIN — PANTOPRAZOLE SODIUM 40 MG: 40 TABLET, DELAYED RELEASE ORAL at 08:48

## 2019-01-01 RX ADMIN — POTASSIUM CHLORIDE 20 MEQ: 20 TABLET, EXTENDED RELEASE ORAL at 08:05

## 2019-01-01 RX ADMIN — PANTOPRAZOLE SODIUM 40 MG: 40 TABLET, DELAYED RELEASE ORAL at 06:52

## 2019-01-01 RX ADMIN — PANTOPRAZOLE SODIUM 40 MG: 40 TABLET, DELAYED RELEASE ORAL at 06:24

## 2019-01-01 RX ADMIN — DEXTROSE AND SODIUM CHLORIDE: 5; 450 INJECTION, SOLUTION INTRAVENOUS at 20:52

## 2019-01-01 RX ADMIN — DEXTROSE AND SODIUM CHLORIDE: 5; 450 INJECTION, SOLUTION INTRAVENOUS at 14:51

## 2019-01-01 RX ADMIN — SODIUM CHLORIDE, PRESERVATIVE FREE 10 ML: 5 INJECTION INTRAVENOUS at 09:45

## 2019-01-01 RX ADMIN — POTASSIUM CHLORIDE 20 MEQ: 20 TABLET, EXTENDED RELEASE ORAL at 09:43

## 2019-01-01 RX ADMIN — Medication 100 MG: at 09:23

## 2019-01-01 RX ADMIN — DEXTROSE AND SODIUM CHLORIDE: 5; 450 INJECTION, SOLUTION INTRAVENOUS at 23:41

## 2019-01-01 RX ADMIN — POTASSIUM CHLORIDE 10 MEQ: 7.46 INJECTION, SOLUTION INTRAVENOUS at 09:51

## 2019-01-01 RX ADMIN — MAGNESIUM SULFATE HEPTAHYDRATE 2 G: 40 INJECTION, SOLUTION INTRAVENOUS at 13:14

## 2019-01-01 RX ADMIN — PANTOPRAZOLE SODIUM 40 MG: 40 TABLET, DELAYED RELEASE ORAL at 10:08

## 2019-01-01 RX ADMIN — DEXTROSE AND SODIUM CHLORIDE: 5; 450 INJECTION, SOLUTION INTRAVENOUS at 11:18

## 2019-01-01 RX ADMIN — DEXTROSE AND SODIUM CHLORIDE: 5; 450 INJECTION, SOLUTION INTRAVENOUS at 06:35

## 2019-01-01 RX ADMIN — FOLIC ACID 1 MG: 1 TABLET ORAL at 09:43

## 2019-01-01 RX ADMIN — FOLIC ACID 1 MG: 1 TABLET ORAL at 07:44

## 2019-01-01 RX ADMIN — FOLIC ACID: 5 INJECTION, SOLUTION INTRAMUSCULAR; INTRAVENOUS; SUBCUTANEOUS at 17:30

## 2019-01-01 RX ADMIN — Medication 100 MG: at 08:13

## 2019-01-01 RX ADMIN — Medication 10 ML: at 19:37

## 2019-01-01 RX ADMIN — DEXTROSE AND SODIUM CHLORIDE: 5; 450 INJECTION, SOLUTION INTRAVENOUS at 06:25

## 2019-01-01 RX ADMIN — LORAZEPAM 3 MG: 1 TABLET ORAL at 01:30

## 2019-01-01 RX ADMIN — MULTIPLE VITAMINS W/ MINERALS TAB 1 TABLET: TAB at 09:22

## 2019-01-01 RX ADMIN — POTASSIUM CHLORIDE 10 MEQ: 7.46 INJECTION, SOLUTION INTRAVENOUS at 10:51

## 2019-01-01 RX ADMIN — METOPROLOL SUCCINATE 50 MG: 50 TABLET, FILM COATED, EXTENDED RELEASE ORAL at 10:07

## 2019-01-01 RX ADMIN — FOLIC ACID 1 MG: 1 TABLET ORAL at 10:07

## 2019-01-01 RX ADMIN — LORAZEPAM 3 MG: 1 TABLET ORAL at 03:54

## 2019-01-01 RX ADMIN — POTASSIUM CHLORIDE 10 MEQ: 7.46 INJECTION, SOLUTION INTRAVENOUS at 16:23

## 2019-01-01 RX ADMIN — SODIUM CHLORIDE 1000 ML: 9 INJECTION, SOLUTION INTRAVENOUS at 18:04

## 2019-01-01 RX ADMIN — FOLIC ACID 1 MG: 1 TABLET ORAL at 08:13

## 2019-01-01 RX ADMIN — PANTOPRAZOLE SODIUM 40 MG: 40 TABLET, DELAYED RELEASE ORAL at 06:22

## 2019-01-01 RX ADMIN — SODIUM CHLORIDE, PRESERVATIVE FREE 10 ML: 5 INJECTION INTRAVENOUS at 08:57

## 2019-01-01 RX ADMIN — PANTOPRAZOLE SODIUM 40 MG: 40 TABLET, DELAYED RELEASE ORAL at 08:13

## 2019-01-01 RX ADMIN — METOPROLOL SUCCINATE 50 MG: 50 TABLET, FILM COATED, EXTENDED RELEASE ORAL at 08:56

## 2019-01-01 RX ADMIN — PNEUMOCOCCAL 13-VALENT CONJUGATE VACCINE 0.5 ML: 2.2; 2.2; 2.2; 2.2; 2.2; 4.4; 2.2; 2.2; 2.2; 2.2; 2.2; 2.2; 2.2 INJECTION, SUSPENSION INTRAMUSCULAR at 09:54

## 2019-01-01 RX ADMIN — METOPROLOL SUCCINATE 50 MG: 50 TABLET, FILM COATED, EXTENDED RELEASE ORAL at 09:43

## 2019-01-01 RX ADMIN — PANTOPRAZOLE SODIUM 40 MG: 40 TABLET, DELAYED RELEASE ORAL at 06:37

## 2019-01-01 RX ADMIN — FOLIC ACID 1 MG: 1 TABLET ORAL at 08:05

## 2019-01-01 RX ADMIN — MULTIPLE VITAMINS W/ MINERALS TAB 1 TABLET: TAB at 08:13

## 2019-01-01 RX ADMIN — INFLUENZA A VIRUS A/BRISBANE/02/2018 IVR-190 (H1N1) ANTIGEN (PROPIOLACTONE INACTIVATED), INFLUENZA A VIRUS A/KANSAS/14/2017 X-327 (H3N2) ANTIGEN (PROPIOLACTONE INACTIVATED), INFLUENZA B VIRUS B/MARYLAND/15/2016 ANTIGEN (PROPIOLACTONE INACTIVATED), INFLUENZA B VIRUS B/PHUKET/3073/2013 BVR-1B ANTIGEN (PROPIOLACTONE INACTIVATED) 0.5 ML: 15; 15; 15; 15 INJECTION, SUSPENSION INTRAMUSCULAR at 09:31

## 2019-01-01 RX ADMIN — POTASSIUM CHLORIDE 20 MEQ: 20 TABLET, EXTENDED RELEASE ORAL at 10:07

## 2019-01-01 RX ADMIN — POTASSIUM BICARBONATE 40 MEQ: 782 TABLET, EFFERVESCENT ORAL at 18:09

## 2019-01-01 RX ADMIN — ENOXAPARIN SODIUM 40 MG: 40 INJECTION SUBCUTANEOUS at 09:23

## 2019-01-01 RX ADMIN — SODIUM CHLORIDE: 9 INJECTION, SOLUTION INTRAVENOUS at 23:06

## 2019-01-01 RX ADMIN — MAGNESIUM SULFATE HEPTAHYDRATE 2 G: 40 INJECTION, SOLUTION INTRAVENOUS at 11:13

## 2019-01-01 RX ADMIN — Medication 10 ML: at 09:27

## 2019-01-01 RX ADMIN — SODIUM CHLORIDE, PRESERVATIVE FREE 10 ML: 5 INJECTION INTRAVENOUS at 09:44

## 2019-01-01 ASSESSMENT — PAIN SCALES - GENERAL
PAINLEVEL_OUTOF10: 0

## 2019-04-01 ENCOUNTER — HOSPITAL ENCOUNTER (OUTPATIENT)
Age: 69
Discharge: HOME | End: 2019-04-01
Payer: MEDICARE

## 2019-04-01 ENCOUNTER — HOSPITAL ENCOUNTER (OUTPATIENT)
Dept: GENERAL RADIOLOGY | Age: 69
Discharge: HOME | End: 2019-04-01
Payer: MEDICARE

## 2019-04-01 DIAGNOSIS — R05.9 COUGH: ICD-10-CM

## 2019-04-01 DIAGNOSIS — E78.00 HIGH CHOLESTEROL: ICD-10-CM

## 2019-04-01 DIAGNOSIS — Z12.5 SCREENING FOR PROSTATE CANCER: ICD-10-CM

## 2019-04-01 DIAGNOSIS — R73.9 HYPERGLYCEMIA: ICD-10-CM

## 2019-04-01 LAB
A/G RATIO: 0.8 (ref 1.1–2.2)
ALBUMIN SERPL-MCNC: 3.5 G/DL (ref 3.4–5)
ALP BLD-CCNC: 185 U/L (ref 40–129)
ALT SERPL-CCNC: 112 U/L (ref 10–40)
ANION GAP SERPL CALCULATED.3IONS-SCNC: 17 MMOL/L (ref 3–16)
AST SERPL-CCNC: 139 U/L (ref 15–37)
BASOPHILS ABSOLUTE: 0 K/UL (ref 0–0.2)
BASOPHILS RELATIVE PERCENT: 0.3 %
BILIRUB SERPL-MCNC: 0.9 MG/DL (ref 0–1)
BUN BLDV-MCNC: 11 MG/DL (ref 7–20)
CALCIUM SERPL-MCNC: 8.6 MG/DL (ref 8.3–10.6)
CHLORIDE BLD-SCNC: 94 MMOL/L (ref 99–110)
CHOLESTEROL, TOTAL: 150 MG/DL (ref 0–199)
CO2: 21 MMOL/L (ref 21–32)
CREAT SERPL-MCNC: 0.7 MG/DL (ref 0.8–1.3)
EOSINOPHILS ABSOLUTE: 0 K/UL (ref 0–0.6)
EOSINOPHILS RELATIVE PERCENT: 0.5 %
ESTIMATED AVERAGE GLUCOSE: 96.8 MG/DL
FOLATE: 18.91 NG/ML (ref 4.78–24.2)
GFR AFRICAN AMERICAN: >60
GFR NON-AFRICAN AMERICAN: >60
GLOBULIN: 4.6 G/DL
GLUCOSE BLD-MCNC: 114 MG/DL (ref 70–99)
HBA1C MFR BLD: 5 %
HCT VFR BLD CALC: 43.5 % (ref 40.5–52.5)
HDLC SERPL-MCNC: 57 MG/DL (ref 40–60)
HEMOGLOBIN: 14.8 G/DL (ref 13.5–17.5)
LDL CHOLESTEROL CALCULATED: 76 MG/DL
LYMPHOCYTES ABSOLUTE: 1 K/UL (ref 1–5.1)
LYMPHOCYTES RELATIVE PERCENT: 17.2 %
MCH RBC QN AUTO: 34.8 PG (ref 26–34)
MCHC RBC AUTO-ENTMCNC: 34 G/DL (ref 31–36)
MCV RBC AUTO: 102.4 FL (ref 80–100)
MONOCYTES ABSOLUTE: 0.6 K/UL (ref 0–1.3)
MONOCYTES RELATIVE PERCENT: 9.9 %
NEUTROPHILS ABSOLUTE: 4.1 K/UL (ref 1.7–7.7)
NEUTROPHILS RELATIVE PERCENT: 72.1 %
PDW BLD-RTO: 15.8 % (ref 12.4–15.4)
PLATELET # BLD: 103 K/UL (ref 135–450)
PMV BLD AUTO: 9.9 FL (ref 5–10.5)
POTASSIUM SERPL-SCNC: 3.5 MMOL/L (ref 3.5–5.1)
PROSTATE SPECIFIC ANTIGEN: 0.75 NG/ML (ref 0–4)
RBC # BLD: 4.25 M/UL (ref 4.2–5.9)
SEDIMENTATION RATE, ERYTHROCYTE: 19 MM/HR (ref 0–20)
SODIUM BLD-SCNC: 132 MMOL/L (ref 136–145)
TOTAL PROTEIN: 8.1 G/DL (ref 6.4–8.2)
TRIGL SERPL-MCNC: 83 MG/DL (ref 0–150)
TSH SERPL DL<=0.05 MIU/L-ACNC: 1.91 UIU/ML (ref 0.27–4.2)
VITAMIN B-12: 307 PG/ML (ref 211–911)
VLDLC SERPL CALC-MCNC: 17 MG/DL
WBC # BLD: 5.7 K/UL (ref 4–11)

## 2019-04-01 PROCEDURE — 85652 RBC SED RATE AUTOMATED: CPT

## 2019-04-01 PROCEDURE — 80053 COMPREHEN METABOLIC PANEL: CPT

## 2019-04-01 PROCEDURE — 85025 COMPLETE CBC W/AUTO DIFF WBC: CPT

## 2019-04-01 PROCEDURE — G0103 PSA SCREENING: HCPCS

## 2019-04-01 PROCEDURE — 83036 HEMOGLOBIN GLYCOSYLATED A1C: CPT

## 2019-04-01 PROCEDURE — 82607 VITAMIN B-12: CPT

## 2019-04-01 PROCEDURE — 84153 ASSAY OF PSA TOTAL: CPT

## 2019-04-01 PROCEDURE — 80061 LIPID PANEL: CPT

## 2019-04-01 PROCEDURE — 36415 COLL VENOUS BLD VENIPUNCTURE: CPT

## 2019-04-01 PROCEDURE — 82746 ASSAY OF FOLIC ACID SERUM: CPT

## 2019-04-01 PROCEDURE — 84443 ASSAY THYROID STIM HORMONE: CPT

## 2019-04-01 PROCEDURE — 71046 X-RAY EXAM CHEST 2 VIEWS: CPT

## 2019-05-30 ENCOUNTER — APPOINTMENT (OUTPATIENT)
Dept: GENERAL RADIOLOGY | Age: 69
DRG: 872 | End: 2019-05-30
Payer: MEDICARE

## 2019-05-30 ENCOUNTER — APPOINTMENT (OUTPATIENT)
Dept: CT IMAGING | Age: 69
DRG: 872 | End: 2019-05-30
Payer: MEDICARE

## 2019-05-30 ENCOUNTER — HOSPITAL ENCOUNTER (INPATIENT)
Age: 69
LOS: 4 days | Discharge: HOME HEALTH | DRG: 872 | End: 2019-06-03
Attending: EMERGENCY MEDICINE | Admitting: INTERNAL MEDICINE
Payer: MEDICARE

## 2019-05-30 DIAGNOSIS — D69.6 THROMBOCYTOPENIA (HCC): ICD-10-CM

## 2019-05-30 DIAGNOSIS — N30.00 ACUTE CYSTITIS WITHOUT HEMATURIA: ICD-10-CM

## 2019-05-30 DIAGNOSIS — A41.9 SEPSIS, DUE TO UNSPECIFIED ORGANISM: Primary | ICD-10-CM

## 2019-05-30 DIAGNOSIS — R90.89 ABNORMAL BRAIN CT: ICD-10-CM

## 2019-05-30 DIAGNOSIS — S32.591A CLOSED FRACTURE OF RIGHT INFERIOR PUBIC RAMUS, INITIAL ENCOUNTER (HCC): ICD-10-CM

## 2019-05-30 DIAGNOSIS — F10.10 ALCOHOL ABUSE: ICD-10-CM

## 2019-05-30 DIAGNOSIS — H53.9 VISION CHANGES: ICD-10-CM

## 2019-05-30 LAB
A/G RATIO: 0.9 (ref 1.1–2.2)
ALBUMIN SERPL-MCNC: 4.1 G/DL (ref 3.4–5)
ALP BLD-CCNC: 118 U/L (ref 40–129)
ALT SERPL-CCNC: 44 U/L (ref 10–40)
ANION GAP SERPL CALCULATED.3IONS-SCNC: 17 MMOL/L (ref 3–16)
APTT: 27.3 SEC (ref 26–36)
AST SERPL-CCNC: 84 U/L (ref 15–37)
BILIRUB SERPL-MCNC: 1.6 MG/DL (ref 0–1)
BILIRUBIN URINE: ABNORMAL
BLOOD, URINE: NEGATIVE
BUN BLDV-MCNC: 27 MG/DL (ref 7–20)
CALCIUM SERPL-MCNC: 9.7 MG/DL (ref 8.3–10.6)
CASTS: ABNORMAL /LPF
CHLORIDE BLD-SCNC: 92 MMOL/L (ref 99–110)
CLARITY: ABNORMAL
CO2: 25 MMOL/L (ref 21–32)
COLOR: ABNORMAL
COMMENT UA: ABNORMAL
CREAT SERPL-MCNC: 0.9 MG/DL (ref 0.8–1.3)
EKG ATRIAL RATE: 118 BPM
EKG DIAGNOSIS: NORMAL
EKG P AXIS: 61 DEGREES
EKG P-R INTERVAL: 164 MS
EKG Q-T INTERVAL: 346 MS
EKG QRS DURATION: 96 MS
EKG QTC CALCULATION (BAZETT): 484 MS
EKG R AXIS: -56 DEGREES
EKG T AXIS: 61 DEGREES
EKG VENTRICULAR RATE: 118 BPM
EPITHELIAL CELLS, UA: 1 /HPF (ref 0–5)
GFR AFRICAN AMERICAN: >60
GFR NON-AFRICAN AMERICAN: >60
GLOBULIN: 4.4 G/DL
GLUCOSE BLD-MCNC: 102 MG/DL (ref 70–99)
GLUCOSE URINE: NEGATIVE MG/DL
HCT VFR BLD CALC: 43.7 % (ref 40.5–52.5)
HEMOGLOBIN: 15 G/DL (ref 13.5–17.5)
INR BLD: 1.01 (ref 0.86–1.14)
KETONES, URINE: 15 MG/DL
LACTIC ACID, SEPSIS: 1.1 MMOL/L (ref 0.4–1.9)
LACTIC ACID, SEPSIS: 1.1 MMOL/L (ref 0.4–1.9)
LACTIC ACID: 2.3 MMOL/L (ref 0.4–2)
LEUKOCYTE ESTERASE, URINE: ABNORMAL
MCH RBC QN AUTO: 34.8 PG (ref 26–34)
MCHC RBC AUTO-ENTMCNC: 34.3 G/DL (ref 31–36)
MCV RBC AUTO: 101.4 FL (ref 80–100)
MICROSCOPIC EXAMINATION: YES
NITRITE, URINE: POSITIVE
PDW BLD-RTO: 14.4 % (ref 12.4–15.4)
PH UA: 5.5 (ref 5–8)
PLATELET # BLD: 59 K/UL (ref 135–450)
PLATELET SLIDE REVIEW: ABNORMAL
PMV BLD AUTO: 10.6 FL (ref 5–10.5)
POTASSIUM SERPL-SCNC: 4 MMOL/L (ref 3.5–5.1)
PROTEIN UA: 30 MG/DL
PROTHROMBIN TIME: 11.5 SEC (ref 9.8–13)
RBC # BLD: 4.31 M/UL (ref 4.2–5.9)
RBC UA: 3 /HPF (ref 0–4)
SLIDE REVIEW: ABNORMAL
SODIUM BLD-SCNC: 134 MMOL/L (ref 136–145)
SPECIFIC GRAVITY UA: 1.03 (ref 1–1.03)
TOTAL CK: 36 U/L (ref 39–308)
TOTAL PROTEIN: 8.5 G/DL (ref 6.4–8.2)
URINE REFLEX TO CULTURE: YES
URINE TYPE: ABNORMAL
UROBILINOGEN, URINE: 1 E.U./DL
WBC # BLD: 5.4 K/UL (ref 4–11)
WBC UA: 11 /HPF (ref 0–5)

## 2019-05-30 PROCEDURE — 81001 URINALYSIS AUTO W/SCOPE: CPT

## 2019-05-30 PROCEDURE — 2580000003 HC RX 258: Performed by: INTERNAL MEDICINE

## 2019-05-30 PROCEDURE — 93010 ELECTROCARDIOGRAM REPORT: CPT | Performed by: INTERNAL MEDICINE

## 2019-05-30 PROCEDURE — 87040 BLOOD CULTURE FOR BACTERIA: CPT

## 2019-05-30 PROCEDURE — 70450 CT HEAD/BRAIN W/O DYE: CPT

## 2019-05-30 PROCEDURE — 94760 N-INVAS EAR/PLS OXIMETRY 1: CPT

## 2019-05-30 PROCEDURE — 83605 ASSAY OF LACTIC ACID: CPT

## 2019-05-30 PROCEDURE — 73700 CT LOWER EXTREMITY W/O DYE: CPT

## 2019-05-30 PROCEDURE — 85027 COMPLETE CBC AUTOMATED: CPT

## 2019-05-30 PROCEDURE — 93005 ELECTROCARDIOGRAM TRACING: CPT | Performed by: PHYSICIAN ASSISTANT

## 2019-05-30 PROCEDURE — 6360000002 HC RX W HCPCS: Performed by: INTERNAL MEDICINE

## 2019-05-30 PROCEDURE — 1200000000 HC SEMI PRIVATE

## 2019-05-30 PROCEDURE — 85610 PROTHROMBIN TIME: CPT

## 2019-05-30 PROCEDURE — 2580000003 HC RX 258: Performed by: PHYSICIAN ASSISTANT

## 2019-05-30 PROCEDURE — 87641 MR-STAPH DNA AMP PROBE: CPT

## 2019-05-30 PROCEDURE — 82550 ASSAY OF CK (CPK): CPT

## 2019-05-30 PROCEDURE — 85730 THROMBOPLASTIN TIME PARTIAL: CPT

## 2019-05-30 PROCEDURE — 87086 URINE CULTURE/COLONY COUNT: CPT

## 2019-05-30 PROCEDURE — 96376 TX/PRO/DX INJ SAME DRUG ADON: CPT

## 2019-05-30 PROCEDURE — 72125 CT NECK SPINE W/O DYE: CPT

## 2019-05-30 PROCEDURE — 71046 X-RAY EXAM CHEST 2 VIEWS: CPT

## 2019-05-30 PROCEDURE — 96365 THER/PROPH/DIAG IV INF INIT: CPT

## 2019-05-30 PROCEDURE — 6360000002 HC RX W HCPCS: Performed by: PHYSICIAN ASSISTANT

## 2019-05-30 PROCEDURE — 36415 COLL VENOUS BLD VENIPUNCTURE: CPT

## 2019-05-30 PROCEDURE — 96361 HYDRATE IV INFUSION ADD-ON: CPT

## 2019-05-30 PROCEDURE — 99285 EMERGENCY DEPT VISIT HI MDM: CPT

## 2019-05-30 PROCEDURE — 73502 X-RAY EXAM HIP UNI 2-3 VIEWS: CPT

## 2019-05-30 PROCEDURE — 96375 TX/PRO/DX INJ NEW DRUG ADDON: CPT

## 2019-05-30 PROCEDURE — 6370000000 HC RX 637 (ALT 250 FOR IP): Performed by: INTERNAL MEDICINE

## 2019-05-30 PROCEDURE — 80053 COMPREHEN METABOLIC PANEL: CPT

## 2019-05-30 RX ORDER — MORPHINE SULFATE 2 MG/ML
2 INJECTION, SOLUTION INTRAMUSCULAR; INTRAVENOUS ONCE
Status: COMPLETED | OUTPATIENT
Start: 2019-05-30 | End: 2019-05-30

## 2019-05-30 RX ORDER — SODIUM CHLORIDE 0.9 % (FLUSH) 0.9 %
10 SYRINGE (ML) INJECTION PRN
Status: DISCONTINUED | OUTPATIENT
Start: 2019-05-30 | End: 2019-06-02 | Stop reason: SDUPTHER

## 2019-05-30 RX ORDER — ACETAMINOPHEN 500 MG
1000 TABLET ORAL 2 TIMES DAILY PRN
COMMUNITY
End: 2019-01-01

## 2019-05-30 RX ORDER — METOPROLOL SUCCINATE 50 MG/1
50 TABLET, EXTENDED RELEASE ORAL DAILY
Status: DISCONTINUED | OUTPATIENT
Start: 2019-05-30 | End: 2019-06-03 | Stop reason: HOSPADM

## 2019-05-30 RX ORDER — SODIUM CHLORIDE 0.9 % (FLUSH) 0.9 %
10 SYRINGE (ML) INJECTION EVERY 12 HOURS SCHEDULED
Status: DISCONTINUED | OUTPATIENT
Start: 2019-05-30 | End: 2019-06-02 | Stop reason: SDUPTHER

## 2019-05-30 RX ORDER — PANTOPRAZOLE SODIUM 40 MG/1
40 TABLET, DELAYED RELEASE ORAL
Status: DISCONTINUED | OUTPATIENT
Start: 2019-05-31 | End: 2019-06-03 | Stop reason: HOSPADM

## 2019-05-30 RX ORDER — ALBUTEROL SULFATE 90 UG/1
2 AEROSOL, METERED RESPIRATORY (INHALATION) 4 TIMES DAILY PRN
Status: DISCONTINUED | OUTPATIENT
Start: 2019-05-30 | End: 2019-06-03 | Stop reason: HOSPADM

## 2019-05-30 RX ORDER — THIAMINE MONONITRATE (VIT B1) 100 MG
100 TABLET ORAL DAILY
Status: DISCONTINUED | OUTPATIENT
Start: 2019-05-30 | End: 2019-06-03 | Stop reason: HOSPADM

## 2019-05-30 RX ORDER — ASPIRIN 81 MG/1
81 TABLET ORAL DAILY
Status: DISCONTINUED | OUTPATIENT
Start: 2019-05-30 | End: 2019-06-03 | Stop reason: HOSPADM

## 2019-05-30 RX ORDER — SODIUM CHLORIDE 9 MG/ML
INJECTION, SOLUTION INTRAVENOUS CONTINUOUS
Status: DISCONTINUED | OUTPATIENT
Start: 2019-05-30 | End: 2019-06-03 | Stop reason: HOSPADM

## 2019-05-30 RX ORDER — 0.9 % SODIUM CHLORIDE 0.9 %
1000 INTRAVENOUS SOLUTION INTRAVENOUS ONCE
Status: COMPLETED | OUTPATIENT
Start: 2019-05-30 | End: 2019-05-30

## 2019-05-30 RX ORDER — LEVOFLOXACIN 5 MG/ML
500 INJECTION, SOLUTION INTRAVENOUS EVERY 24 HOURS
Status: DISCONTINUED | OUTPATIENT
Start: 2019-05-30 | End: 2019-06-03

## 2019-05-30 RX ORDER — ONDANSETRON 2 MG/ML
4 INJECTION INTRAMUSCULAR; INTRAVENOUS EVERY 6 HOURS PRN
Status: DISCONTINUED | OUTPATIENT
Start: 2019-05-30 | End: 2019-06-02

## 2019-05-30 RX ADMIN — METOPROLOL SUCCINATE 50 MG: 50 TABLET, EXTENDED RELEASE ORAL at 18:22

## 2019-05-30 RX ADMIN — ASPIRIN 81 MG: 81 TABLET, COATED ORAL at 18:22

## 2019-05-30 RX ADMIN — CEFTRIAXONE 1 G: 1 INJECTION, POWDER, FOR SOLUTION INTRAMUSCULAR; INTRAVENOUS at 14:53

## 2019-05-30 RX ADMIN — MORPHINE SULFATE 2 MG: 2 INJECTION, SOLUTION INTRAMUSCULAR; INTRAVENOUS at 12:27

## 2019-05-30 RX ADMIN — MORPHINE SULFATE 2 MG: 2 INJECTION, SOLUTION INTRAMUSCULAR; INTRAVENOUS at 16:21

## 2019-05-30 RX ADMIN — SODIUM CHLORIDE 1000 ML: 9 INJECTION, SOLUTION INTRAVENOUS at 15:19

## 2019-05-30 RX ADMIN — ENOXAPARIN SODIUM 40 MG: 40 INJECTION SUBCUTANEOUS at 21:59

## 2019-05-30 RX ADMIN — SODIUM CHLORIDE: 9 INJECTION, SOLUTION INTRAVENOUS at 18:22

## 2019-05-30 RX ADMIN — SODIUM CHLORIDE 1000 ML: 9 INJECTION, SOLUTION INTRAVENOUS at 12:26

## 2019-05-30 RX ADMIN — Medication 100 MG: at 18:22

## 2019-05-30 RX ADMIN — Medication 10 ML: at 21:59

## 2019-05-30 RX ADMIN — LEVOFLOXACIN 500 MG: 5 INJECTION, SOLUTION INTRAVENOUS at 18:23

## 2019-05-30 ASSESSMENT — PAIN SCALES - GENERAL
PAINLEVEL_OUTOF10: 0
PAINLEVEL_OUTOF10: 5
PAINLEVEL_OUTOF10: 5
PAINLEVEL_OUTOF10: 10
PAINLEVEL_OUTOF10: 5
PAINLEVEL_OUTOF10: 3
PAINLEVEL_OUTOF10: 8
PAINLEVEL_OUTOF10: 4

## 2019-05-30 ASSESSMENT — ENCOUNTER SYMPTOMS
SHORTNESS OF BREATH: 0
VOMITING: 0
NAUSEA: 0
ABDOMINAL PAIN: 0

## 2019-05-30 ASSESSMENT — PAIN DESCRIPTION - LOCATION
LOCATION: HIP
LOCATION: HIP

## 2019-05-30 ASSESSMENT — PAIN DESCRIPTION - ONSET
ONSET: ON-GOING
ONSET: ON-GOING

## 2019-05-30 ASSESSMENT — PAIN DESCRIPTION - PAIN TYPE
TYPE: ACUTE PAIN
TYPE: ACUTE PAIN

## 2019-05-30 ASSESSMENT — PAIN DESCRIPTION - FREQUENCY
FREQUENCY: CONTINUOUS
FREQUENCY: INTERMITTENT

## 2019-05-30 ASSESSMENT — PAIN - FUNCTIONAL ASSESSMENT: PAIN_FUNCTIONAL_ASSESSMENT: PREVENTS OR INTERFERES WITH MANY ACTIVE NOT PASSIVE ACTIVITIES

## 2019-05-30 ASSESSMENT — PAIN DESCRIPTION - DESCRIPTORS
DESCRIPTORS: ACHING;SHARP
DESCRIPTORS: ACHING

## 2019-05-30 ASSESSMENT — PAIN DESCRIPTION - PROGRESSION
CLINICAL_PROGRESSION: NOT CHANGED
CLINICAL_PROGRESSION: NOT CHANGED

## 2019-05-30 ASSESSMENT — PAIN DESCRIPTION - ORIENTATION
ORIENTATION: RIGHT
ORIENTATION: RIGHT

## 2019-05-30 NOTE — ED PROVIDER NOTES
Attending Supervisory Note/Shared Visit   I have personally performed a face to face diagnostic evaluation on this patient. I have reviewed the mid-levels findings and agree. History and Exam by me shows a disheveled chronically ill-appearing male in no acute distress. Patient recently was admitted after having a compression fracture spine to a nursing home. Patient states a while nursing home began losing vision in his left eye. Patient states he is able to see lights and shadows denies complete loss of vision. Patient denies eye pain. Patient states that 3 days prior to presentation he had a fall the right hip and is having grossly worsening pain in the hip. Patient denies loss of motor function and states he did not present to the ED because he was hoping the pain would improve. Patient denies changes in bowel or urine function. On exam patient's extraocular movements intact. Is not able to distinguish to open and close hand. Patient has a cloudy pupil I cannot evaluate the posterior aspect of the patient's eye. Extraocular movements are intact. Cranial nerves II through XII are grossly intact. Patient's pelvis feels stable and abdomen is soft nontender nondistended. Patient's workup was directable for an elevated lactate and leukocytosis. Patient was tachycardic and urine was concerning for infection. Patient will be given to the hospital for IV antibiotics. CT of the pelvis that shows appearing, fracture. I agree with the ENRIQUE plan of care. Please ENRIQUE Note for full ED course and final disposition      Disposition:  No diagnosis found.       Johnson Acosta MD  Attending Emergency Physician          Johnson Acosta MD  05/30/19 1425

## 2019-05-30 NOTE — ED TRIAGE NOTES
Pt A&O to ED with c/o right hip pain since Saturday and some vision loss in his left eye x 2 months. Pt states that he had a mechanical fall on Saturday and has a history of falls, pt denies lightheaded, dizziness, or LOC. Pt states that he did hit his head, he denies blood thinners but reports taking asa. Pt has some vision in his left eye but states that he lost some of the vision 2 months ago. Pt also reports drinking 4-5 shots of vodka a day 5-6 days a week. Pt states that he did not drink today.

## 2019-05-30 NOTE — ED NOTES
Fall risk screening completed. Fall risk bracelet applied to patient. Non-skid socks provided and placed on patient. The fall risk is indicated using dome light . The call light is within the patient's reach and instructions for use were provided. The bed is in the lowest position with wheels locked. The patient has been advised to notify staff using the call light if there is a need to get up or use restroom. The patient verbalized understanding of safety precautions and how to contact staff for assistance.         Maria L Wilcox, RN  05/30/19 3181

## 2019-05-30 NOTE — ED NOTES
Pharmacy Medication Reconciliation Note     List of medications patient is currently taking is complete. Allergies:  Patient has no known allergies. Source of information:   1. EMR  2. patienet    Notes regarding home medications:   1. Patient is a poor historian, does not know what he takes. Reports he takes what the doctor prescribes  2. Did not have his medications today.      Denies taking any other OTC or herbal medications    Holland Ellison PharmD, BCPS  5/30/2019  4:32 PM

## 2019-05-30 NOTE — PROGRESS NOTES
4 Eyes Skin Assessment     The patient is being assess for  Admission    I agree that 2 RN's have performed a thorough Head to Toe Skin Assessment on the patient. ALL assessment sites listed below have been assessed. Areas assessed by both nurses:   [x]   Head, Face, and Ears   [x]   Shoulders, Back, and Chest  [x]   Arms, Elbows, and Hands   [x]   Coccyx, Sacrum, and IschIum  [x]   Legs, Feet, and Heels        Does the Patient have Skin Breakdown?   No         Tino Prevention initiated:  No   Wound Care Orders initiated:  No      [facility] nurse consulted for Pressure Injury (Stage 3,4, Unstageable, DTI, NWPT, and Complex wounds), New and Established Ostomies:  NA      Nurse 1 eSignature: Electronically signed by [id number] on 5/30/19 at 5:54 PM    **SHARE this note so that the co-signing nurse is able to place an eSignature**    Nurse 2 eSignature: Electronically signed by Donna Beverly RN on 5/30/19 at 5:58 PM

## 2019-05-30 NOTE — ED PROVIDER NOTES
[facility]  eMERGENCY dEPARTMENT eNCOUnter      Pt Name: Elizabeth Peterson  MRN: [id number]  Daphnegfadeel 1950  Date of evaluation: 5/30/2019  Provider: Demetris Allan Dr       Chief Complaint   Patient presents with    Hip Pain     R hip following a fall on sat    Loss of Vision     left eye x 2 months         HISTORY OF PRESENT ILLNESS  (Location/Symptom, Timing/Onset, Context/Setting, Quality, Duration, Modifying Factors, Severity.)   Elizabeth Peterson is a 77 y.o. male who presents to the emergency department for right hip pain after fall 4 days ago. Patient reports he \"just fell\". Reports he falls frequently. Denies lightheadedness or dizziness prior to the fall. He did not lay on the floor for an extended period of time. He reports he finally presents for the pain because he cannot walk on it. Has been trying to use a cane. Has a friend that lives with him that helps take care of him. He reports he did hit his head when he fell. Did not lose consciousness. Denies syncope prior to fall. Friend at bedside reports he does take ASA. Also will take tylenol for pain. Patient also reports he cannot see out of his left eye for 2-3 months. Thinks he developed a cataract. Denies fever chills nausea vomiting abdominal pain chest pain shortness of breath. Does drink alcohol 5-6 days a week. Drinks about 4 shots of vodka when he does drink. Denies history of withdrawal.  Denies drug use. Has not drank any alcohol today. Nursing Notes were reviewed and I agree. REVIEW OF SYSTEMS    (2-9 systems for level 4, 10 or more for level 5)     Review of Systems   Constitutional: Negative for chills and fever. HENT:        +head inj   Eyes: Positive for visual disturbance. Respiratory: Negative for shortness of breath. Cardiovascular: Negative for chest pain. Gastrointestinal: Negative for abdominal pain, nausea and vomiting. Musculoskeletal: Positive for arthralgias. Neurological: Negative for dizziness, syncope and light-headedness. Except as noted above the remainder of the review of systems was reviewed and negative. PAST MEDICAL HISTORY         Diagnosis Date    Alcohol abuse     COPD (chronic obstructive pulmonary disease) ([facility] [address]     Tobacco abuse        SURGICAL HISTORY     History reviewed. No pertinent surgical history. CURRENT MEDICATIONS       Previous Medications    ACETAMINOPHEN (TYLENOL) 500 MG TABLET    Take 1,000 mg by mouth 2 times daily as needed for Pain    ALBUTEROL SULFATE  (90 BASE) MCG/ACT INHALER    Inhale 2 puffs into the lungs 4 times daily as needed for Wheezing    ASPIRIN EC 81 MG EC TABLET    Take 1 tablet by mouth daily    METOPROLOL SUCCINATE (TOPROL XL) 50 MG EXTENDED RELEASE TABLET    Take 1 tablet by mouth daily    MULTIPLE VITAMINS-MINERALS (THERAPEUTIC MULTIVITAMIN-MINERALS) TABLET    Take 1 tablet by mouth daily    OMEPRAZOLE (PRILOSEC) 20 MG DELAYED RELEASE CAPSULE    Take 1 capsule by mouth daily    VITAMIN B-1 (THIAMINE) 100 MG TABLET    Take 1 tablet by mouth daily       ALLERGIES     Patient has no known allergies. FAMILY HISTORY           Problem Relation Age of Onset    Other Mother         \"old age\"   Avilez COPD Father     Breast Cancer Sister    Avilez Cancer Brother         brain     Family Status   Relation Name Status    Mother      Father      Sister      Brother          SOCIAL HISTORY      reports that he has been smoking cigarettes. He has a 27.50 pack-year smoking history. He has never used smokeless tobacco. He reports that he drinks alcohol. He reports that he does not use drugs.     PHYSICAL EXAM    (up to 7 for level 4, 8 or more for level 5)     ED Triage Vitals [19 1125]   BP Temp Temp Source Pulse Resp SpO2 Height Weight   121/82 98.2 °F (36.8 °C) Oral 120 -- 95 % -- 145 lb 11.6 oz (66.1 kg)       Physical Exam Constitutional: He is oriented to person, place, and time. No distress. Thin, frail, chronically ill appearing   HENT:   Head: Normocephalic and atraumatic. Right Ear: External ear normal.   Left Ear: External ear normal.   Nose: Nose normal.   No raccoon eyes or adkins signs bilaterally  TMs occluded by wax bilaterally   Eyes: Pupils are equal, round, and reactive to light. Conjunctivae and EOM are normal.   Neck: Normal range of motion. Neck supple. Cardiovascular: Regular rhythm and normal heart sounds. tachy   Pulmonary/Chest: Effort normal and breath sounds normal. No respiratory distress. Abdominal: Soft. He exhibits no distension and no mass. There is no tenderness. There is no rebound and no guarding. No hernia. Musculoskeletal:   No TTP of the entire midline spine    No bruising on back    No TTP of the right hip with no erythema edema or ecchymosis. Has pain with any attempt of passive or active ROM. Knee nontender. DP pulse 2+. Compartments fo the leg are soft. Neurological: He is alert and oriented to person, place, and time. Skin: Skin is warm and dry. He is not diaphoretic. Psychiatric: He has a normal mood and affect. His behavior is normal. Judgment and thought content normal.       DIFFERENTIAL DIAGNOSIS   Fracture, dislocation, soft tissue injury  Subdural hematoma, Epidural Hematoma, Subarachnoid Hemorrhage, Traumatic Brain Injury, Cervical Spine fracture      DIAGNOSTICRESULTS     EKG: All EKG's are interpreted by TAYA Nichols in the absence of a cardiologist.    EKG obtained. Please see Dr. Enid Merritt note for interpretation.     RADIOLOGY:   Non-plain film images such as CT, Ultrasound and MRI are read by the radiologist. Plain radiographic images are visualized and preliminarily interpreted by TAYA Nichols with the below findings:      Interpretation per the Radiologist below, if available at the time of this note:    XR CHEST STANDARD (2 VW)   Final 9.7 8.3 - 10.6 mg/dL    Total Protein 8.5 (H) 6.4 - 8.2 g/dL    Alb 4.1 3.4 - 5.0 g/dL    Albumin/Globulin Ratio 0.9 (L) 1.1 - 2.2    Total Bilirubin 1.6 (H) 0.0 - 1.0 mg/dL    Alkaline Phosphatase 118 40 - 129 U/L    ALT 44 (H) 10 - 40 U/L    AST 84 (H) 15 - 37 U/L    Globulin 4.4 g/dL   Protime-INR   Result Value Ref Range    Protime 11.5 9.8 - 13.0 sec    INR 1.01 0.86 - 1.14   APTT   Result Value Ref Range    aPTT 27.3 26.0 - 36.0 sec   Urinalysis Reflex to Culture   Result Value Ref Range    Color, UA DARK YELLOW (A) Straw/Yellow    Clarity, UA CLOUDY (A) Clear    Glucose, Ur Negative Negative mg/dL    Bilirubin Urine LARGE (A) Negative    Ketones, Urine 15 (A) Negative mg/dL    Specific Gravity, UA 1.026 1.005 - 1.030    Blood, Urine Negative Negative    pH, UA 5.5 5.0 - 8.0    Protein, UA 30 (A) Negative mg/dL    Urobilinogen, Urine 1.0 <2.0 E.U./dL    Nitrite, Urine POSITIVE (A) Negative    Leukocyte Esterase, Urine MODERATE (A) Negative    Microscopic Examination YES     Urine Reflex to Culture Yes     Urine Type Not Specified    Lactic Acid, Plasma   Result Value Ref Range    Lactic Acid 2.3 (H) 0.4 - 2.0 mmol/L   CK   Result Value Ref Range    Total CK 36 (L) 39 - 308 U/L   Microscopic Urinalysis   Result Value Ref Range    Casts > 40 Hyaline (A) /LPF    Urinalysis Comments see below     WBC, UA 11 (H) 0 - 5 /HPF    RBC, UA 3 0 - 4 /HPF    Epi Cells 1 0 - 5 /HPF   Lactate, Sepsis   Result Value Ref Range    Lactic Acid, Sepsis 1.1 0.4 - 1.9 mmol/L   EKG 12 Lead   Result Value Ref Range    Ventricular Rate 118 BPM    Atrial Rate 118 BPM    P-R Interval 164 ms    QRS Duration 96 ms    Q-T Interval 346 ms    QTc Calculation (Bazett) 484 ms    P Axis 61 degrees    R Axis -56 degrees    T Axis 61 degrees    Diagnosis       Sinus tachycardiaslow R wave progression with late transitionProlonged QTLeft axis deviationAbnormal ECGWhen compared with ECG of 29-JUL-2018 15:38,QRS axis Shifted leftQuestionable change in initial forces of Anterolateral leadsQT has lengthenedConfirmed by Kori Ramirez ([id number]) on 5/30/2019 3:46:32 PM       All other labs were withinnormal range or not returned as of this dictation. EMERGENCY DEPARTMENT COURSE and DIFFERENTIAL DIAGNOSIS/MDM:   Vitals:    Vitals:    05/30/19 1600 05/30/19 1615 05/30/19 1625 05/30/19 1630   BP: 122/76  133/81 122/85   Pulse: 96 93 105 103   Resp: 16 18 18 17   Temp: 98.9 °F (37.2 °C)   98.7 °F (37.1 °C)   TempSrc: Oral   Oral   SpO2: 98%   96%   Weight:           Patient was seen and examined by myself and Dr. Iva Maldonado. Patient was nontoxic, well appearing, afebrile with normal vital signs with the exception of tacycardia 120. Was given morphine and bolus. EKG obtained. See Dr. Enrrique Sargent note for interpretation. Xray right hip was negative so CT obtained that showed a right inferior pubic ramus fracture. CXR negative. CT head with no evidence of acute posttraumatic intracranial abnormality. Does have subtle foci of abnormal low attenuation in the right periventricular white matter. Could represent tiny foci of subacute/chronic ischemic change that is new when compared to 7/29/18. CBC with platelet count 59, likely related to his alcohol abuse. CMP with BUN 27. Urine with positive nitrite, moderate leukocytes. Lactic elevated 2.3. Does meet sepsis criteria. Sepsis was identified at 1421. Was given sepsis fluid bolus. Blood cultures obtained. Given rocephin. He requires admission. I consulted PCP And spoke with Dr. Allyson Barrios who accepted admission. Upon multiple reevals, he is lying in bed in no distress. Clinically appears well. He is in agreement with plan for admission and is admitted in stable condition. CRITICAL CARE TIME   Total Critical Care time was 35 minutes, excluding separately reportable procedures.   There was a high probability of clinically significant/life threatening deterioration in the patient's condition which required my urgent intervention. Time was spent managing patients sepsis and pubic rami fracture with fluids, pain medication, antibiotics, and reevaluations. This patient was seen by the attendingphysician and they agreed with the assessment and plan. CONSULTS:  IP CONSULT TO PRIMARY CARE PROVIDER    PROCEDURES:  None    FINAL IMPRESSION      1. Sepsis, due to unspecified organism ([facility] [address]    2. Acute cystitis without hematuria    3. Closed fracture of right inferior pubic ramus, initial encounter ([facility] [address]    4. Abnormal brain CT    5. Thrombocytopenia ([facility] [address]    6.  Alcohol abuse          DISPOSITION/PLAN   DISPOSITION Admitted 05/30/2019 03:41:10 PM      PATIENT REFERRED TO:  Lizz Loza MD  [facility] [address]  [phone]            DISCHARGE MEDICATIONS:  New Prescriptions    No medications on file       (Please note that portions of this note werecompleted with a voice recognition program.  Efforts were made to edit the dictations but occasionally words are mis-transcribed.)    Sixto Ibrahim, [address] [place] [address]  05/30/19 1213

## 2019-05-30 NOTE — ED NOTES
Handoff given to 8 Luis Miguel RN to assume care of pt. Pt pain addressed at this time.       Petra Ko RN  05/30/19 7362

## 2019-05-31 ENCOUNTER — APPOINTMENT (OUTPATIENT)
Dept: MRI IMAGING | Age: 69
DRG: 872 | End: 2019-05-31
Payer: MEDICARE

## 2019-05-31 LAB
A/G RATIO: 1 (ref 1.1–2.2)
ALBUMIN SERPL-MCNC: 3.3 G/DL (ref 3.4–5)
ALP BLD-CCNC: 93 U/L (ref 40–129)
ALT SERPL-CCNC: 27 U/L (ref 10–40)
ANION GAP SERPL CALCULATED.3IONS-SCNC: 13 MMOL/L (ref 3–16)
AST SERPL-CCNC: 45 U/L (ref 15–37)
BASOPHILS ABSOLUTE: 0 K/UL (ref 0–0.2)
BASOPHILS RELATIVE PERCENT: 0.6 %
BILIRUB SERPL-MCNC: 0.9 MG/DL (ref 0–1)
BUN BLDV-MCNC: 16 MG/DL (ref 7–20)
C-REACTIVE PROTEIN: 18.2 MG/L (ref 0–5.1)
CALCIUM SERPL-MCNC: 8.4 MG/DL (ref 8.3–10.6)
CHLORIDE BLD-SCNC: 99 MMOL/L (ref 99–110)
CO2: 21 MMOL/L (ref 21–32)
CREAT SERPL-MCNC: 0.5 MG/DL (ref 0.8–1.3)
EOSINOPHILS ABSOLUTE: 0 K/UL (ref 0–0.6)
EOSINOPHILS RELATIVE PERCENT: 0.8 %
GFR AFRICAN AMERICAN: >60
GFR NON-AFRICAN AMERICAN: >60
GLOBULIN: 3.3 G/DL
GLUCOSE BLD-MCNC: 114 MG/DL (ref 70–99)
HCT VFR BLD CALC: 37.2 % (ref 40.5–52.5)
HEMOGLOBIN: 12.5 G/DL (ref 13.5–17.5)
LYMPHOCYTES ABSOLUTE: 0.8 K/UL (ref 1–5.1)
LYMPHOCYTES RELATIVE PERCENT: 26.6 %
MCH RBC QN AUTO: 34.2 PG (ref 26–34)
MCHC RBC AUTO-ENTMCNC: 33.6 G/DL (ref 31–36)
MCV RBC AUTO: 101.9 FL (ref 80–100)
MONOCYTES ABSOLUTE: 0.5 K/UL (ref 0–1.3)
MONOCYTES RELATIVE PERCENT: 15.6 %
MRSA SCREEN RT-PCR: NORMAL
NEUTROPHILS ABSOLUTE: 1.8 K/UL (ref 1.7–7.7)
NEUTROPHILS RELATIVE PERCENT: 56.4 %
PDW BLD-RTO: 14.2 % (ref 12.4–15.4)
PLATELET # BLD: 51 K/UL (ref 135–450)
PMV BLD AUTO: 10.7 FL (ref 5–10.5)
POTASSIUM SERPL-SCNC: 3.3 MMOL/L (ref 3.5–5.1)
RBC # BLD: 3.65 M/UL (ref 4.2–5.9)
SEDIMENTATION RATE, ERYTHROCYTE: 20 MM/HR (ref 0–20)
SODIUM BLD-SCNC: 133 MMOL/L (ref 136–145)
TOTAL PROTEIN: 6.6 G/DL (ref 6.4–8.2)
URINE CULTURE, ROUTINE: NORMAL
WBC # BLD: 3.2 K/UL (ref 4–11)

## 2019-05-31 PROCEDURE — 1200000000 HC SEMI PRIVATE

## 2019-05-31 PROCEDURE — 86140 C-REACTIVE PROTEIN: CPT

## 2019-05-31 PROCEDURE — 99223 1ST HOSP IP/OBS HIGH 75: CPT | Performed by: INTERNAL MEDICINE

## 2019-05-31 PROCEDURE — 85652 RBC SED RATE AUTOMATED: CPT

## 2019-05-31 PROCEDURE — 85025 COMPLETE CBC W/AUTO DIFF WBC: CPT

## 2019-05-31 PROCEDURE — 80053 COMPREHEN METABOLIC PANEL: CPT

## 2019-05-31 PROCEDURE — 36415 COLL VENOUS BLD VENIPUNCTURE: CPT

## 2019-05-31 PROCEDURE — 94760 N-INVAS EAR/PLS OXIMETRY 1: CPT

## 2019-05-31 PROCEDURE — 6360000002 HC RX W HCPCS: Performed by: INTERNAL MEDICINE

## 2019-05-31 PROCEDURE — 70551 MRI BRAIN STEM W/O DYE: CPT

## 2019-05-31 PROCEDURE — 2580000003 HC RX 258: Performed by: INTERNAL MEDICINE

## 2019-05-31 PROCEDURE — 6370000000 HC RX 637 (ALT 250 FOR IP): Performed by: INTERNAL MEDICINE

## 2019-05-31 RX ORDER — POTASSIUM CHLORIDE 20 MEQ/1
40 TABLET, EXTENDED RELEASE ORAL ONCE
Status: COMPLETED | OUTPATIENT
Start: 2019-05-31 | End: 2019-05-31

## 2019-05-31 RX ORDER — POTASSIUM CHLORIDE 7.45 MG/ML
10 INJECTION INTRAVENOUS ONCE
Status: COMPLETED | OUTPATIENT
Start: 2019-05-31 | End: 2019-05-31

## 2019-05-31 RX ORDER — NICOTINE 21 MG/24HR
1 PATCH, TRANSDERMAL 24 HOURS TRANSDERMAL DAILY
Status: DISCONTINUED | OUTPATIENT
Start: 2019-05-31 | End: 2019-06-03 | Stop reason: HOSPADM

## 2019-05-31 RX ADMIN — Medication 10 ML: at 08:47

## 2019-05-31 RX ADMIN — ASPIRIN 81 MG: 81 TABLET, COATED ORAL at 08:45

## 2019-05-31 RX ADMIN — LEVOFLOXACIN 500 MG: 5 INJECTION, SOLUTION INTRAVENOUS at 18:24

## 2019-05-31 RX ADMIN — METOPROLOL SUCCINATE 50 MG: 50 TABLET, EXTENDED RELEASE ORAL at 08:45

## 2019-05-31 RX ADMIN — PANTOPRAZOLE SODIUM 40 MG: 40 TABLET, DELAYED RELEASE ORAL at 05:54

## 2019-05-31 RX ADMIN — Medication 100 MG: at 08:45

## 2019-05-31 RX ADMIN — POTASSIUM CHLORIDE 40 MEQ: 20 TABLET, EXTENDED RELEASE ORAL at 18:23

## 2019-05-31 RX ADMIN — SODIUM CHLORIDE: 9 INJECTION, SOLUTION INTRAVENOUS at 09:57

## 2019-05-31 RX ADMIN — Medication 10 ML: at 22:31

## 2019-05-31 ASSESSMENT — PAIN DESCRIPTION - ORIENTATION: ORIENTATION: RIGHT

## 2019-05-31 ASSESSMENT — PAIN SCALES - GENERAL
PAINLEVEL_OUTOF10: 2
PAINLEVEL_OUTOF10: 0

## 2019-05-31 ASSESSMENT — PAIN DESCRIPTION - LOCATION: LOCATION: HIP

## 2019-05-31 ASSESSMENT — PAIN DESCRIPTION - FREQUENCY: FREQUENCY: INTERMITTENT

## 2019-05-31 ASSESSMENT — PAIN DESCRIPTION - PAIN TYPE: TYPE: ACUTE PAIN

## 2019-05-31 ASSESSMENT — PAIN DESCRIPTION - ONSET: ONSET: ON-GOING

## 2019-05-31 ASSESSMENT — PAIN DESCRIPTION - DESCRIPTORS: DESCRIPTORS: ACHING;STABBING

## 2019-05-31 NOTE — CARE COORDINATION
INITIAL CASE MANAGEMENT ASSESSMENT    Reviewed chart, met with patient to assess possible discharge needs. Explained Case Management role/services. Living Situation: Patient reports his friend Garrie Felty lives with him in an apt with 4+7 steps to enter    ADLs: Independent     DME: cane, RW, shower chair    PT/OT Recs: Ordered     Active Services: None. Transportation: Does not drive. Reports he will have transportation at discharge     Medications: No barriers to taking/obtaining medications    PCP: Dr. Antolin Villa      HD/PD: N/A    PLAN/COMMENTS: Patient confirms he does not receive any alcohol treatment and is not interested in any at this time. Unwilling to provide any additional information. Follow for PT/OT recs. SW/CM provided contact information for patient or family to call with any questions. SW/CM will follow and assist as needed.       Electronically signed by JOEL Villegas on 5/31/2019 at 4:04 PM

## 2019-05-31 NOTE — FLOWSHEET NOTE
05/31/19 1156   Encounter Summary   Services provided to: Patient   Referral/Consult From: Nurse   Support System Family members  (Aunt Leny)   Continue Visiting   (visit, papa, AD doc discussion w/pt 5/31 CL)   Complexity of Encounter Moderate   Length of Encounter 15 minutes   Spiritual/Oriental orthodox   Type Spiritual support   Assessment Calm; Approachable;Coping   Intervention Active listening;Discussed relationship with God;Discussed belief system/Evangelical practices/pietro   Outcome Engaged in conversation;Coping   Advance Directives (For Healthcare)   Healthcare Directive Other (Comment)  (per pt he may have completed forms, he will talk with fam)   Electronically signed by Lucia Greenms on 5/31/2019 at 12:01 PM

## 2019-05-31 NOTE — PLAN OF CARE
Problem: Falls - Risk of:  Goal: Will remain free from falls  Description  Will remain free from falls  Outcome: Ongoing  Goal: Absence of physical injury  Description  Absence of physical injury  Outcome: Ongoing     Problem: Pain:  Description  Pain management should include both nonpharmacologic and pharmacologic interventions. Goal: Pain level will decrease  Description  Pain level will decrease  Outcome: Ongoing  Goal: Control of acute pain  Description  Control of acute pain  Outcome: Ongoing  Goal: Control of chronic pain  Description  Control of chronic pain  Outcome: Ongoing     Problem: Risk for Impaired Skin Integrity  Goal: Tissue integrity - skin and mucous membranes  Description  Structural intactness and normal physiological function of skin and  mucous membranes.   Outcome: Ongoing

## 2019-05-31 NOTE — CONSULTS
sodium chloride 75 mL/hr at 05/30/19 9992     Medications Prior to Admission:   acetaminophen (TYLENOL) 500 MG tablet, Take 1,000 mg by mouth 2 times daily as needed for Pain  Multiple Vitamins-Minerals (THERAPEUTIC MULTIVITAMIN-MINERALS) tablet, Take 1 tablet by mouth daily  aspirin EC 81 MG EC tablet, Take 1 tablet by mouth daily  vitamin B-1 (THIAMINE) 100 MG tablet, Take 1 tablet by mouth daily  metoprolol succinate (TOPROL XL) 50 MG extended release tablet, Take 1 tablet by mouth daily  omeprazole (PRILOSEC) 20 MG delayed release capsule, Take 1 capsule by mouth daily  albuterol sulfate  (90 Base) MCG/ACT inhaler, Inhale 2 puffs into the lungs 4 times daily as needed for Wheezing    No Known Allergies    Family History   Problem Relation Age of Onset    Other Mother         \"old age\"   Charla Specter COPD Father     Breast Cancer Sister     Cancer Brother         brain     Social History     Tobacco Use   Smoking Status Current Every Day Smoker    Packs/day: 0.50    Years: 55.00    Pack years: 27.50    Types: Cigarettes   Smokeless Tobacco Never Used     Social History     Substance and Sexual Activity   Drug Use No     Social History     Substance and Sexual Activity   Alcohol Use Yes    Comment: daily use (pint per day)     ROS:  Constitutional- No weight loss or fevers  Eyes- No diplopia. No photophobia. Ears/nose/throat- No dysphagia. No Dysarthria  Cardiovascular- No palpitations. No chest pain  Respiratory- No dyspnea. No Cough  Gastrointestinal- No Abdominal pain. No Vomiting. Genitourinary- No incontinence. No urinary retention  Musculoskeletal- No myalgia. + arthralgia  Skin- No rash. No easy bruising. Psychiatric- No depression. No anxiety  Endocrine- No diabetes. No thyroid issues. Hematologic- No bleeding difficulty. No fatigue  Neurologic- No weakness. No Headache. Exam:  Blood pressure 123/79, pulse 86, temperature 98.7 °F (37.1 °C), temperature source Oral, resp.  rate 17, height 6' 4\" (1.93 m), weight 151 lb 0.2 oz (68.5 kg), SpO2 95 %. Constitutional    Vital signs: BP, HR, and RR reviewed   General alert, no distress, well-nourished  Eyes: unable to visualize the fundi  Cardiovascular: pulses symmetric in all 4 extremities. No peripheral edema. Psychiatric: cooperative with examination, no psychotic behavior noted. Neurologic  Mental status:   orientation to person, place, situation, year. General fund of knowledge grossly intact   Memory grossly intact   Attention intact as able to attend well to the exam     Language fluent in conversation. No aphasia. Comprehension intact; follows simple commands  Cranial nerves:   CN2: visual fields grossly full w/o extinction on confrontational testing. Chronic, progressive visual impairment of the left eye only . CN 3,4,6: extraocular muscles intact. Pupils are equal, round, reactive bilaterally. CN5: facial sensation symmetric   CN7: face symmetric without dysarthria  CN8: a bit hard of hearing  CN9: palate elevated symmetrically  CN11: trap full strength on shoulder shrug  CN12: tongue midline with protrusion  Strength: No pronator drift. Good strength in his RUE/LLE/LUE. Limited exam in his RLE due to hip pain. Good dorsi and plantar flexion. Deep tendon reflexes: normal in all 4 extremities  Sensory: light touch intact in all 4 extremities. Cerebellar/coordination: finger nose finger normal without ataxia  Tone: normal in all 4 extremities  Gait: deferred at this time given hip fracture. Labs  Glucose 102  Na 134  K 4.0  BUN 27  Creatinine 0.9  Lactic acid 2.3 -> 1.1  CK 36  ALT 44  AST 84    WBC 5.4K  Hg 15.0  Platelets 59  INR 9.80    LDL 76    UA + nitrites, moderate LE, 11 WBCs. Studies  CT head 5/30/19, independently reviewed  No acute intracranial abnormality. Subtle foci of abnormal attenuation in the right periventricular white matter. Atrophy most notably identified in the cerebellum.         CT R hip 5/30/19  Acute fracture of the right inferior pubic ramus. Impression  1. Fall w/ acute right inferior pubic ramus fracture. 2.  Abnormal CT head. Suspect chronic disease, though can't entirely exclude subacute stroke. 3.  Chronic, progressive monocular (L) visual impairment  4. ETOH abuse  5. Possible UTI    Recommendations  1. MRI brain w/o contrast - ordered. 2.  Orthopedic surgery consultation. 3.  Pain management per primary team.   4.  Treatment for possible UTI underway. 5.  Monitor for ETOH withdrawal.    6.  Will check ESR/CRP. Likely is going to need Ophthalmology evaluation. Will follow up on MRI brain this afternoon.       Beatrice Borjas NP  [address] [place] [address] [id number] Neurology    A copy of this note was provided for Dr Brittany Berry MD

## 2019-05-31 NOTE — PROGRESS NOTES
Patient is A&O x3, very [place]. RA, sat 92%. No complaints of SOB. C/o mild pain in right hip with movement. Respirations appear to easy and unlabored. Lungs clear. Respirations easy with no complaints of cough. No complaints of nausea/vomiting/diarrhea. Up with assist to the bathroom/BSC as needed. Voids per urinal.  Incontinent of urine at times. Tolerating regular diet. Plan of care and safety measures reviewed with the patient. Call light in reach and bed alarm in place. Will continue to monitor.   Electronically signed by Apoorva Ricci RN on 5/30/2019 at 11:52 PM

## 2019-05-31 NOTE — PLAN OF CARE
Pt free from falls this shift. Fall precautions in place at all times. Call light always within reach. Pt able and agreeable to contact for safety appropriately. Pain/discomfort being managed with PRN analgesics per MD orders. Pt able to express presence and absence of pain and rate pain appropriately using numerical scale.

## 2019-05-31 NOTE — H&P
Hospital Medicine History & Physical    Patient:  Radha Ramírez  YOB: 1950  Date of Service: 5/31/19  MRN: [id number]    PCP: Christal Hewitt MD    Date of Admission: 5/30/2019      Chief Complaint:    Chief Complaint   Patient presents with    Hip Pain     R hip following a fall on sat    Loss of Vision     left eye x 2 months         History Of Present Illness: The patient is a 77 y.o. male who presents to [facility] with fall  Has pain in the pelvic area  Diagnosed with pubic rami fx  And sepsis  H/o alcohol abuse  Feels better now  Denies any other complaints  Past Medical History:        Diagnosis Date    Alcohol abuse     COPD (chronic obstructive pulmonary disease) ([facility] [address]     Tobacco abuse        Past Surgical History:    History reviewed. No pertinent surgical history. Family History:  Family History   Problem Relation Age of Onset    Other Mother         \"old age\"   Avilez COPD Father     Breast Cancer Sister     Cancer Brother         brain       Medications Prior to Admission:    Prior to Admission medications    Medication Sig Start Date End Date Taking?  Authorizing Provider   acetaminophen (TYLENOL) 500 MG tablet Take 1,000 mg by mouth 2 times daily as needed for Pain   Yes Historical Provider, MD   Multiple Vitamins-Minerals (THERAPEUTIC MULTIVITAMIN-MINERALS) tablet Take 1 tablet by mouth daily 3/2/19 3/1/20 Yes Christal Hewitt MD   aspirin EC 81 MG EC tablet Take 1 tablet by mouth daily 3/2/19  Yes Christal Hewitt MD   vitamin B-1 (THIAMINE) 100 MG tablet Take 1 tablet by mouth daily 3/2/19  Yes Christal Hewitt MD   metoprolol succinate (TOPROL XL) 50 MG extended release tablet Take 1 tablet by mouth daily 3/2/19  Yes Christal Hewitt MD   omeprazole (PRILOSEC) 20 MG delayed release capsule Take 1 capsule by mouth daily 3/2/19  Yes Read MD Magi   albuterol sulfate  (90 Base) MCG/ACT inhaler Inhale 2 puffs into the lungs 4 times daily as needed for Wheezing 3/2/19   Read MD Magi       Allergies:  Patient has no known allergies. Social History:    Social History     Socioeconomic History    Marital status: Single     Spouse name: Not on file    Number of children: Not on file    Years of education: Not on file    Highest education level: Not on file   Occupational History    Not on file   Social Needs    Financial resource strain: Not on file    Food insecurity:     Worry: Not on file     Inability: Not on file    Transportation needs:     Medical: Not on file     Non-medical: Not on file   Tobacco Use    Smoking status: Current Every Day Smoker     Packs/day: 0.50     Years: 55.00     Pack years: 27.50     Types: Cigarettes    Smokeless tobacco: Never Used   Substance and Sexual Activity    Alcohol use: Yes     Comment: daily use (pint per day)    Drug use: No    Sexual activity: Yes     Partners: Female   Lifestyle    Physical activity:     Days per week: Not on file     Minutes per session: Not on file    Stress: Not on file   Relationships    Social connections:     Talks on phone: Not on file     Gets together: Not on file     Attends Rastafarian service: Not on file     Active member of club or organization: Not on file     Attends meetings of clubs or organizations: Not on file     Relationship status: Not on file    Intimate partner violence:     Fear of current or ex partner: Not on file     Emotionally abused: Not on file     Physically abused: Not on file     Forced sexual activity: Not on file   Other Topics Concern    Not on file   Social History Narrative    Not on file       TOBACCO:   reports that he has been smoking cigarettes. He has a 27.50 pack-year smoking history. He has never used smokeless tobacco.  ETOH:   reports that he drinks alcohol.     REVIEW OF SYSTEMS:    Vital: /77   Pulse 72   Temp 99.1 °F (37.3 °C) (Oral)   Resp 18   Ht 6' 4\" (1.93 m)   Wt 151 lb 0.2 oz (68.5 kg)   SpO2 93%   BMI 18.38 kg/m²   Constitutional: no fever, no night sweats,  fatigue  Head: no headache, no head injury, no migranes. Eye: no blurring of vision, no double vision. Ears: no hearing difficulty, no tinnitus  Mouth/throat: no ulceration, dental caries, dysphagia  Lungs: no cough, no shortness of breath, no wheeze  CVS: no palpitation, no chest pain, no shortness of breath  GI: no abdominal pain, no nausea , no vomiting, no constipation  ALINA: no dysuria, frequency and urgency, no hematuria, no kidney stones  Musculoskeletal:back pain  Endocrine: no polyuria, polydypsia, no cold or heat intolerence  Hematology: no anemia, no easy brusing or bleeding, no hx of clotting disorder  Dermatology: no skin rash, no eczema, no prurities,  Psychiatry: no depression, no anxiety,no panic attacks, no suicide ideation  Neurology: no syncope,   PHYSICAL EXAM:  General:  Awake, alert, not in distress. Appears to be stated age. HEENT: Atraumatic, normocephalic. PERRLA. EOM intact. Pink conjunctiva, anicteric sclera. Pink and moist oral mucosa. No lymphadenopathy. Trachea midline. Thyroid non palpable. Neck supple. No carotid bruit. No JVD. Chest: Bilateal air entry, Clear to auscultation, no wheezing, rhonchi or rales. Cardiovascular: RRR, T9Q8, no murmur, click, rub or gallop. No lower extremity edema. Pedal and posterior tibialis 2+. Abdomen: Soft, non tender to palpation. Active bowel sounds x 4 quadrants. Musculoskeletal: Limitation of the rom of the joints and LSS  SLR is positive on the affected side  No gross weak ness appreciated  CNS: Oriented to person, place and time. dishelved    CXR:   XR CHEST STANDARD (2 VW)   Final Result   No acute abnormality         CT HIP RIGHT WO CONTRAST   Final Result   1. Acute fracture of the right inferior pubic ramus.   Osseous structures of   the pelvis otherwise appear intact. 2. Chronic appearing compression deformity of the L5 vertebral body with   approximately 40% loss of height. 3. Osteopenia. XR HIP RIGHT (2-3 VIEWS)   Final Result   No acute abnormality of the right hip. No fracture demonstrated. CT Head WO Contrast   Final Result   1. No evidence of acute posttraumatic intracranial abnormality. 2. Subtle foci of abnormal low-attenuation in right periventricular white   matter. Finding could represent tiny foci of subacute/chronic ischemic   change which are new when compared to the study of 07/29/2018. If clinical   symptomatology warrants, follow-up MRI examination with diffusion imaging may   be helpful for more complete evaluation. CT Cervical Spine WO Contrast   Final Result   No radiographic findings to suggest the presence of acute fracture of the   cervical spine. MRI BRAIN WO CONTRAST    (Results Pending)     EKG:  I have reviewed the EKG.     CBC   Recent Labs     05/30/19  1219 05/31/19  1029   WBC 5.4 3.2*   HGB 15.0 12.5*   HCT 43.7 37.2*   PLT 59* 51*      RENAL  Recent Labs     05/30/19  1219 05/31/19  1029   * 133*   K 4.0 3.3*   CL 92* 99   CO2 25 21   BUN 27* 16   CREATININE 0.9 0.5*     LFT'S  Recent Labs     05/30/19  1219 05/31/19  1029   AST 84* 45*   ALT 44* 27   BILITOT 1.6* 0.9   ALKPHOS 118 93     COAG  Recent Labs     05/30/19  1219   INR 1.01     CARDIAC ENZYMES  Recent Labs     05/30/19  1219   CKTOTAL 36*       U/A:    Lab Results   Component Value Date    COLORU DARK YELLOW 05/30/2019    WBCUA 11 05/30/2019    RBCUA 3 05/30/2019    MUCUS 3+ 06/20/2018    BACTERIA Rare 10/15/2015    CLARITYU CLOUDY 05/30/2019    SPECGRAV 1.026 05/30/2019    LEUKOCYTESUR MODERATE 05/30/2019    BLOODU Negative 05/30/2019    GLUCOSEU Negative 05/30/2019    AMORPHOUS 2+ 06/20/2018       ABG  No results found for: CPR2PKI, BEART, F2YUDPKC, PHART, THGBART, NZO2XZQ, PO2ART, Damon Ritter 50 Problems    Diagnosis Date Noted    Sepsis [facility]) [A41.9] 05/30/2019           ASSESSMENT/PLAN:  Pubic rami fx- ecf placement  Alcohol abuse- counselled  Sepsis  Abnormal ct brain- possible CVA- neuro consult    Pt is stable. Discussed with staff and pt about the plan. See the orders for further plan. Will proceed further acc to pt's progress.     Electronically signed by Marquis Singh MD on 5/31/2019 at 12:47 PM

## 2019-06-01 LAB
LV EF: 40 %
LVEF MODALITY: NORMAL
POTASSIUM SERPL-SCNC: 4.2 MMOL/L (ref 3.5–5.1)

## 2019-06-01 PROCEDURE — 2580000003 HC RX 258: Performed by: INTERNAL MEDICINE

## 2019-06-01 PROCEDURE — 6370000000 HC RX 637 (ALT 250 FOR IP): Performed by: SPECIALIST

## 2019-06-01 PROCEDURE — 94760 N-INVAS EAR/PLS OXIMETRY 1: CPT

## 2019-06-01 PROCEDURE — 6360000002 HC RX W HCPCS: Performed by: INTERNAL MEDICINE

## 2019-06-01 PROCEDURE — 97162 PT EVAL MOD COMPLEX 30 MIN: CPT

## 2019-06-01 PROCEDURE — 97535 SELF CARE MNGMENT TRAINING: CPT

## 2019-06-01 PROCEDURE — 97116 GAIT TRAINING THERAPY: CPT

## 2019-06-01 PROCEDURE — 93306 TTE W/DOPPLER COMPLETE: CPT

## 2019-06-01 PROCEDURE — 84132 ASSAY OF SERUM POTASSIUM: CPT

## 2019-06-01 PROCEDURE — 6370000000 HC RX 637 (ALT 250 FOR IP): Performed by: INTERNAL MEDICINE

## 2019-06-01 PROCEDURE — 1200000000 HC SEMI PRIVATE

## 2019-06-01 PROCEDURE — 97530 THERAPEUTIC ACTIVITIES: CPT

## 2019-06-01 PROCEDURE — 97166 OT EVAL MOD COMPLEX 45 MIN: CPT

## 2019-06-01 PROCEDURE — 36415 COLL VENOUS BLD VENIPUNCTURE: CPT

## 2019-06-01 RX ORDER — TRAMADOL HYDROCHLORIDE 50 MG/1
50 TABLET ORAL EVERY 8 HOURS PRN
Status: DISCONTINUED | OUTPATIENT
Start: 2019-06-01 | End: 2019-06-03 | Stop reason: HOSPADM

## 2019-06-01 RX ADMIN — TRAMADOL HYDROCHLORIDE 50 MG: 50 TABLET, FILM COATED ORAL at 11:00

## 2019-06-01 RX ADMIN — Medication 100 MG: at 09:50

## 2019-06-01 RX ADMIN — TRAMADOL HYDROCHLORIDE 50 MG: 50 TABLET, FILM COATED ORAL at 22:06

## 2019-06-01 RX ADMIN — Medication 10 ML: at 21:58

## 2019-06-01 RX ADMIN — SODIUM CHLORIDE: 9 INJECTION, SOLUTION INTRAVENOUS at 02:19

## 2019-06-01 RX ADMIN — Medication 10 ML: at 11:00

## 2019-06-01 RX ADMIN — LEVOFLOXACIN 500 MG: 5 INJECTION, SOLUTION INTRAVENOUS at 18:00

## 2019-06-01 RX ADMIN — PANTOPRAZOLE SODIUM 40 MG: 40 TABLET, DELAYED RELEASE ORAL at 07:02

## 2019-06-01 RX ADMIN — SODIUM CHLORIDE: 9 INJECTION, SOLUTION INTRAVENOUS at 14:20

## 2019-06-01 RX ADMIN — ASPIRIN 81 MG: 81 TABLET, COATED ORAL at 09:50

## 2019-06-01 RX ADMIN — METOPROLOL SUCCINATE 50 MG: 50 TABLET, EXTENDED RELEASE ORAL at 09:50

## 2019-06-01 ASSESSMENT — PAIN DESCRIPTION - LOCATION
LOCATION: HIP
LOCATION: HIP
LOCATION: BACK

## 2019-06-01 ASSESSMENT — PAIN SCALES - GENERAL
PAINLEVEL_OUTOF10: 6
PAINLEVEL_OUTOF10: 4
PAINLEVEL_OUTOF10: 0
PAINLEVEL_OUTOF10: 4
PAINLEVEL_OUTOF10: 0
PAINLEVEL_OUTOF10: 4
PAINLEVEL_OUTOF10: 2

## 2019-06-01 ASSESSMENT — PAIN DESCRIPTION - ORIENTATION
ORIENTATION: RIGHT
ORIENTATION: MID;LOWER

## 2019-06-01 ASSESSMENT — PAIN DESCRIPTION - PAIN TYPE
TYPE: ACUTE PAIN
TYPE: ACUTE PAIN
TYPE: ACUTE PAIN;CHRONIC PAIN

## 2019-06-01 ASSESSMENT — PAIN DESCRIPTION - DESCRIPTORS
DESCRIPTORS: ACHING;DISCOMFORT
DESCRIPTORS: ACHING;STABBING
DESCRIPTORS: DULL;ACHING

## 2019-06-01 ASSESSMENT — PAIN DESCRIPTION - PROGRESSION: CLINICAL_PROGRESSION: NOT CHANGED

## 2019-06-01 ASSESSMENT — PAIN DESCRIPTION - FREQUENCY
FREQUENCY: CONTINUOUS
FREQUENCY: CONTINUOUS

## 2019-06-01 ASSESSMENT — PAIN DESCRIPTION - ONSET
ONSET: ON-GOING
ONSET: ON-GOING

## 2019-06-01 NOTE — PROGRESS NOTES
SUMMARY:  Patient is TORY, states he has never gone through withdrawals before when he is not drinking. Patient states he fell at home last Wednesday and landed on his right hip, which does have an acute fx revealed on images this admission. Patient c/o pain in right hip 2/10 that increase to a 6/10 with movement. No request for pain medicine. Patient requests a wheelchair for a.m.  To go to the bathroom, possibly a [facility] will work

## 2019-06-01 NOTE — PROGRESS NOTES
Physical Therapy  Co-Eval with OT   Initial Assessment / Treatment Note    Room Number: B8V-5875/6822-67  NAME: Tsering MADDEN: Medical Record Number: [id number]  MRN: [id number]    ASSESSMENT: Reece Marti is a 77 y.o. M admit 19 with right hip pain one week after fall. Pelvic CT:  Acute fracture of the right inferior pubic ramus. Head and cervical CTs negative acute. Pt has a history of EtOH abuse and this date has no plans to stop drinking except while in the hospital. Prior to admit he was falling frequently at home and using a cane to ambulate in the house (walker in the community). He reports having an eclectic assortment off friends who assist him with IADLs. Today, he performed bed mobility and transfers with SBA and ambulated a few feet in the room with a walker and CGA. His goal is to return home; he appears to be at his baseline and unfortunately somewhat intransigent with his EtOH drinking patterns. Recommend that patient have level 3 home PT and 24*/day sup/assist. Will follow.     Discharge Recommendations: S Level 3, 24 hour supervision or assist, Patient would benefit from continued therapy after discharge   [address] [place] [address]: LEVEL 3 SAFETY   -Initial home health evaluation to occur within 24-48 hours, in patient home    -Home health agency to establish plan of care for patient over 60 day period    -Medication Reconciliation    -PT/OT/Speech evaluations in home within 24-48 hours of discharge; including  -DME and home safety    -Frontload therapy 5 days, then 3x a week    -OT to evaluate if patient has [address] needs for personal care    - evaluation within 24-48 hours, includes evaluation of resources   and insurance to determine [place], [place], LTC, and Medicaid options    -PCP Visit scheduled within three to seven days of discharge   Equipment Needs: Equipment Needed: No(pt reports he has a walker at home)    Date of Service: 19       Patient Diagnosis(es): The primary encounter diagnosis was Sepsis, due to unspecified organism [facility]). Diagnoses of Acute cystitis without hematuria, Closed fracture of right inferior pubic ramus, initial encounter ([facility] [address] Abnormal brain CT, Thrombocytopenia ([facility] [address] Alcohol abuse, and Vision changes were also pertinent to this visit. Past Medical History:  has a past medical history of Alcohol abuse, COPD (chronic obstructive pulmonary disease) ([facility] [address] and Tobacco abuse. Past Surgical History:  has no past surgical history on file. Restrictions  Restrictions/Precautions: Fall Risk        Other position/activity restrictions: pubic ramus fracture    Vision/Hearing  Vision: Impaired  Hearing: Exceptions to [facility]  Hearing Exceptions: Hard of hearing/hearing concerns    SUBJECTIVE:  Chart Reviewed: Yes  Patient assessed for rehabilitation services?: Yes  Additional Pertinent Hx: Brennon Leyva is a 77 y.o. M admit 5/30/19 with right hip pain one week after fall. Pelvic CT:  Acute fracture of the right inferior pubic ramus. Head and cervical CTs negative acute. Referring Practitioner: Terrance Contreras MD  Diagnosis: Pelvic fracture     Subjective: Patient supine in bed and reports he \"can't get up. \" Pt willing to get up to chair. Pt endorses frequent falls but repeatedly expresses belief that his EtOH drinking is not a factor; when asked about his willingness to cease alcohol pt said it would make him unhappy.   Patient Currently in Pain: Denies  Pain Assessment: 0-10  Pain Level: 2  Patient's Stated Pain Goal: No pain  Pain Type: Acute pain  Pain Location: Hip  Pain Orientation: Right  Pain Descriptors: Aching, Stabbing  Pain Frequency: Intermittent  Pain Onset: On-going  Clinical Progression: Not changed  Functional Pain Assessment: Prevents or interferes with many active not passive activities  Non-Pharmaceutical Pain Intervention(s): Rest     Orientation  Overall Orientation Status: Within Functional Limits  Orientation Level: Oriented to place, Oriented to time, Oriented to situation, Oriented to person  Cognition  Exceptions: impaired judgement and insight. Social/Functional History  Social/Functional History  Lives With: Friend(s)(\"ladarius Hamilton\")  Type of Home: Apartment  Home Access: Stairs to enter with rails  Entrance Stairs - Number of Steps: 3-4 with rails on both sides + 7 with rail on left side  Bathroom Shower/Tub: Tub/Shower unit  Bathroom Toilet: Standard(tub and vanity on either side of toilet)  Bathroom Equipment: Grab bars in shower, Shower chair  Home Equipment: Rolling walker, Cane, Alert Button  ADL Assistance: Independent  Homemaking Assistance: (Melvin Powers does the cooking, laundry. Has  1x/wk.)  Ambulation Assistance: Independent  Transfer Assistance: Independent  Active : No  Patient's  Info: \"Aunt B\" (a friend) does the grocery shopping along with pt's Melvin Powers. OBJECTIVE:  ROM  RLE PROM: Exceptions  RLE General PROM: guards his hip rotation in neutral rotation; functional hip flexion; ankle and knee WFL     LLE PROM: [facility]       STRENGTH  Strength RLE: Exception  Comment: hip flexion 3-/5, knee ext 3+/5, ankle DF/PF 5/5     Strength LLE: WFL  Comment: 5/5 hip/knee/ankle       Bed mobility  Supine to Sit: Stand by assistance(HOB up slightly)    Transfers  Sit to Stand: Stand by assistance  Stand to sit: Stand by assistance    Ambulation  Ambulation  Ambulation?: Yes  Ambulation 1  Device: Rolling Walker  Assistance: Contact guard assistance  Quality of Gait: antalgic over RLE, steady, ambulates slightly behind walker base, adequate foot clearance, partial step-through pattern  Distance: 4' from bed to recliner chair    Stairs/Curb  Stairs/Curb  Stairs?: No     Balance  Balance  Sitting - Static: Good  Standing - Static: Fair(CGA ambulating with walker in the context of frequent falls)        Treatment included transfer training, gait training, and patient education.      This note also serves as a D/C Summary in the event that this patient is discharged prior to the next therapy session. Please refer to last PT note for goal status, discharge recommendations and functional status. ASSESSMENT:   Assessment: Promise Colbert is a 77 y.o. M admit 5/30/19 with right hip pain one week after fall. Pelvic CT:  Acute fracture of the right inferior pubic ramus. Head and cervical CTs negative acute. Pt has a history of EtOH abuse and this date has no plans to stop drinking except while in the hospital. Prior to admit he was falling frequently at home and using a cane to ambulate in the house (walker in the community). He reports having an eclectic assortment off friends who assist him with IADLs. Today, he performed bed mobility and transfers with SBA and ambulated a few feet in the room with a walker and CGA. His goal is to return home; he appears to be at his baseline and unfortunately somewhat intransigent with his EtOH drinking patterns. Recommend that patient have level 3 home PT and 24*/day sup/assist. Will follow. Treatment Diagnosis: Decreased activity tolerance, unsteady gait, abnormal gait, decreased safety insight  Prognosis: Good  Decision Making: Medium Complexity  History: Alcohol abuse, COPD (chronic obstructive pulmonary disease) ([facility] [address] and Tobacco abuse. Exam: Decreased activity tolerance, unsteady gait, abnormal gait, decreased safety insight  Clinical Presentation: Evolving activity tolerance/pain  Patient Education: Role of PT, d/c recs. He verb limited understanding. Barriers to Learning: Cog/emotional  REQUIRES PT FOLLOW UP: Yes  Discharge Recommendations: S Level 3, 24 hour supervision or assist, Patient would benefit from continued therapy after discharge    Mallory Plata scored a 19/24 on the AM-PAC short mobility form. Initial research suggests that an AM-PAC score of 18 or greater may be associated with a discharge to patient's home setting.  However in this initial research, cut off scores do not perfectly predict discharge location: 25% of patients with a score of 18 or greater actually went to a rehab facility and 20% of people with a score less than 18 actually went home. Based on my clinical judgement I recommend that the patient have level 3 home Physical Therapy at d/c to increase the patients independence. Safety devices:   Type of devices:  All fall risk precautions in place, Call light within reach, Chair alarm in place, Patient at risk for falls, Nurse notified, Left in chair        PLAN:  Times per week: 3-5x/wk while in the hospital;    Current Treatment Recommendations: Functional Mobility Training     OutComes Score  How much difficulty turning over in bed?: None  How much difficulty sitting down on / standing up from a chair with arms?: A Little  How much difficulty moving from lying on back to sitting on side of bed?: None  How much help from another person moving to and from a bed to a chair?: A Little  How much help from another person needed to walk in hospital room?: A Little  How much help from another person for climbing 3-5 steps with a railing?: A Lot  AM-PAC Inpatient Mobility Raw Score : 19  AM-PAC Inpatient T-Scale Score : 45.44  Mobility Inpatient CMS 0-100% Score: 41.77  Mobility Inpatient CMS G-Code Modifier : CK       Goals  Patient Goals   Patient goals : pt states he was planning on returning home at discharge  Time Frame for Short term goals: upon d/c  Short term goal 1: sup<>sit Ind   Short term goal 2: sit<>stand Supervision   Short term goal 3: Amb 48' with walker and Supervision  Short term goal 4: Up/Down 7 steps with rail and CGA           Therapy Time    Individual Concurrent Group Co-treatment   Time In       0928   Time Out      1010    Minutes         42     Timed Code Treatment Minutes: 30 Minutes      Joy Blackburn, PT

## 2019-06-01 NOTE — PROGRESS NOTES
Department of Internal Medicine  General Internal Medicine  Attending Progress Note      SUBJECTIVE:  Pt is doing fair. No chills. No n/vo/     OBJECTIVE      Medications    Current Facility-Administered Medications: nicotine (NICODERM CQ) 21 MG/24HR 1 patch, 1 patch, Transdermal, Daily  albuterol sulfate  (90 Base) MCG/ACT inhaler 2 puff, 2 puff, Inhalation, 4x Daily PRN  aspirin EC tablet 81 mg, 81 mg, Oral, Daily  metoprolol succinate (TOPROL XL) extended release tablet 50 mg, 50 mg, Oral, Daily  vitamin B-1 (THIAMINE) tablet 100 mg, 100 mg, Oral, Daily  sodium chloride flush 0.9 % injection 10 mL, 10 mL, Intravenous, 2 times per day  sodium chloride flush 0.9 % injection 10 mL, 10 mL, Intravenous, PRN  magnesium hydroxide (MILK OF MAGNESIA) 400 MG/5ML suspension 30 mL, 30 mL, Oral, Daily PRN  ondansetron (ZOFRAN) injection 4 mg, 4 mg, Intravenous, Q6H PRN  pantoprazole (PROTONIX) tablet 40 mg, 40 mg, Oral, QAM AC  0.9 % sodium chloride infusion, , Intravenous, Continuous  levofloxacin (LEVAQUIN) 500 MG/100ML infusion 500 mg, 500 mg, Intravenous, Q24H  Physical    VITALS:  /80   Pulse 73   Temp 99.2 °F (37.3 °C) (Oral)   Resp 16   Ht 6' 4\" (1.93 m)   Wt 155 lb 6.8 oz (70.5 kg)   SpO2 94%   BMI 18.92 kg/m²   CONSTITUTIONAL:  awake, alert, cooperative, no apparent distress, and appears stated age  LUNGS:  No increased work of breathing, good air exchange, clear to auscultation bilaterally, no crackles or wheezing  CARDIOVASCULAR:  Normal apical impulse, regular rate and rhythm, normal S1 and S2, no S3 or S4, and no murmur noted  ABDOMEN:  No scars, normal bowel sounds, soft, non-distended, non-tender, no masses palpated, no hepatosplenomegally, has epigastric hernia.   Data    CBC:   Lab Results   Component Value Date    WBC 3.2 05/31/2019    RBC 3.65 05/31/2019    HGB 12.5 05/31/2019    HCT 37.2 05/31/2019    .9 05/31/2019    MCH 34.2 05/31/2019    MCHC 33.6 05/31/2019    RDW 14.2

## 2019-06-01 NOTE — PLAN OF CARE
Problem: Pain:  Goal: Pain level will decrease  Description  Pain level will decrease  Outcome: Ongoing     Problem: Risk for Impaired Skin Integrity  Goal: Tissue integrity - skin and mucous membranes  Description  Structural intactness and normal physiological function of skin and  mucous membranes.   Outcome: Ongoing     Problem: SAFETY  Goal: Free from accidental physical injury  Outcome: Ongoing     Problem: DAILY CARE  Goal: Daily care needs are met  Outcome: Ongoing

## 2019-06-01 NOTE — PROGRESS NOTES
Occupational Therapy   Occupational Therapy Initial Assessment and Daily Txt Note  Date: 2019   Patient Name: Yvonne Pan  MRN: [id number]     : 1950    Date of Service: 2019    Discharge Recommendations:David Camargo scored a 19/24 on the AM-PAC ADL Inpatient form. Current research shows that an AM-PAC score of 18 or greater is typically associated with a discharge to the patient's home setting. Based on the patients AM-PAC score and their current ADL deficits, it is recommended that the patient have 2-3 sessions per week of Occupational Therapy at d/c to increase the patients independence. HOME HEALTH CARE: LEVEL 1 STANDARD    - Initial home health evaluation to occur within 24-48 hours, in patient home   - Therapy to evaluate with goal of regaining prior level of functioning   - Therapy to evaluate if patient has [address] needs for personal care    2-3 sessions per week, Home with Home health OT, S Level 1  OT Equipment Recommendations  Equipment Needed: No    Assessment   Performance deficits / Impairments: Decreased functional mobility ; Decreased ADL status; Decreased balance;Decreased safe awareness  Assessment: Patient is a 77 y.o. male who presents d/t a fall at home; admitted for management of Closed fracture of right inferior pubic ramus. MRI indicated possible subacute area of infarct. PTA pt was independent with fxl mobility and ADLs however reports multiple falls at home which may or may not be related to alcohol abuse. Currently, pt is functioning below baseline secondary to noted pain in R groin however able to complete ADLs with CGA/SBA and fxl mobility/transfers with RW and CGA/SBA. Pt is declining con't therapy prior to returning home and states his friends are able to assist therefore recommend pt have increased assist, HHOT if agreeable, and use of RW for fxl mobility.    Treatment Diagnosis: Decreased fxl mobility/transfers   Prognosis: Good  Decision Making: Medium Complexity  History: See above  Exam: ADLs, transfers  Assistance / Modification: CGA/SBA with RW   Patient Education: Role of OT, d/c planning, safety, insight into deficits  Barriers to Learning: Decreased insight into safety and deficits  REQUIRES OT FOLLOW UP: Yes  Activity Tolerance  Activity Tolerance: Patient Tolerated treatment well  Safety Devices  Safety Devices in place: Yes  Type of devices: Left in chair;Nurse notified;Gait belt;Call light within reach; Chair alarm in place           Patient Diagnosis(es): The primary encounter diagnosis was Sepsis, due to unspecified organism [facility]). Diagnoses of Acute cystitis without hematuria, Closed fracture of right inferior pubic ramus, initial encounter ([facility] [address] Abnormal brain CT, Thrombocytopenia ([facility] [address] Alcohol abuse, and Vision changes were also pertinent to this visit. has a past medical history of Alcohol abuse, COPD (chronic obstructive pulmonary disease) ([facility] [address] and Tobacco abuse.   has no past surgical history on file. Treatment Diagnosis: Decreased fxl mobility/transfers       Restrictions  Restrictions/Precautions  Restrictions/Precautions: Fall Risk  Position Activity Restriction  Other position/activity restrictions: pubic ramus fracture    Subjective   General  Chart Reviewed: Yes  Patient assessed for rehabilitation services?: Yes  Additional Pertinent Hx: Patient is a 77 y.o. male who presents d/t a fall at home; admitted for management of Closed fracture of right inferior pubic ramus. MRI indicated possible subacute area of infarct. Family / Caregiver Present: No  Referring Practitioner: Christal Hewitt MD  Subjective  Subjective: Patient presents in the bed and agreeable to OT eval. C/o R hip groin pain during transitional movements. RN notified. Ice applied.       Social/Functional History  Social/Functional History  Lives With: Friend(s)(\"ladarius Alfonso\")  Type of Home: Apartment  Home Access: Stairs to enter with rails  Entrance Stairs - Number of Steps: 3-4 with rails on both sides + 7 with rail on left side  Bathroom Shower/Tub: Tub/Shower unit  Bathroom Toilet: Standard(tub and vanity on either side of toilet)  Bathroom Equipment: Grab bars in shower, Shower chair  Home Equipment: Rolling walker, Cane, Alert Button  ADL Assistance: [address]: (Lauro Glasgow does the cooking, laundry. Has  1x/wk.)  Ambulation Assistance: Independent  Transfer Assistance: Independent  Active : No  Patient's  Info: \"Aunt B\" (a friend) does the grocery shopping along with pt's Lauro Glasgow. Objective   Vision: Impaired  Hearing: Exceptions to [facility]  Hearing Exceptions: Hard of hearing/hearing concerns    Orientation  Overall Orientation Status: Within Functional Limits     Balance  Sitting Balance: Supervision  Standing Balance: Contact guard assistance(RW)  Functional Mobility  Functional - Mobility Device: Rolling Walker  Activity: Other  Assist Level: Contact guard assistance  Functional Mobility Comments: EOB > recliner of 4 ft with no LOB. ADL  Feeding: Independent  LE Dressing: Contact guard assistance  Additional Comments: Anticipate pt to require CGA for toileting, SBA for grooming and UE dressing, CGA for bathing. Bed mobility  Supine to Sit: Stand by assistance(HOB up slightly)  Transfers  Sit to stand: Stand by assistance  Stand to sit: Stand by assistance  Transfer Comments: RW     Cognition  Overall Cognitive Status: Exceptions  Arousal/Alertness: Delayed responses to stimuli  Following Commands:  Follows one step commands consistently  Safety Judgement: Decreased awareness of need for assistance;Decreased awareness of need for safety  Insights: Decreased awareness of deficits        Sensation  Overall Sensation Status: WFL        LUE AROM (degrees)  LUE AROM : WFL  Left Hand AROM (degrees)  Left Hand AROM: WFL  RUE AROM (degrees)  RUE AROM : WFL  Right Hand AROM (degrees)  Right Hand AROM: WFL  LUE Strength  Gross LUE Strength: WFL  RUE Strength  Gross RUE Strength: WFL         Plan   Plan  Times per week: 3-5  Times per day: Daily  Current Treatment Recommendations: Strengthening, ROM, Balance Training, Functional Mobility Training, Endurance Training, Safety Education & Training, Patient/Caregiver Education & Training, Equipment Evaluation, Education, & procurement, Self-Care / ADL    AM-PAC Score        AM-PAC Inpatient Daily Activity Raw Score: 19  AM-PAC Inpatient ADL T-Scale Score : 40.22  ADL Inpatient CMS 0-100% Score: 42.8  ADL Inpatient CMS G-Code Modifier : CK    Goals  Short term goals  Time Frame for Short term goals: until d/c:   Short term goal 1: Fxl mobility/transfers via RW mod I. Short term goal 2: Bathing with SPV. Short term goal 3: Dressing with SPV. Short term goal 4: Toileting with SPV. Short term goal 5: Grooming mod I. Patient Goals   Patient goals : \"To get back home\". Therapy Time   Individual Concurrent Group Co-treatment   Time In 0930         Time Out 1014         Minutes 44         Timed Code Treatment Minutes: 29 Minutes(15 min eval )     If pt is discharged prior to next OT session, this note will serve as the discharge summary.   Garry Linn OTR/L #[id number]

## 2019-06-02 PROCEDURE — 6370000000 HC RX 637 (ALT 250 FOR IP): Performed by: SPECIALIST

## 2019-06-02 PROCEDURE — 6370000000 HC RX 637 (ALT 250 FOR IP): Performed by: INTERNAL MEDICINE

## 2019-06-02 PROCEDURE — 2580000003 HC RX 258: Performed by: SPECIALIST

## 2019-06-02 PROCEDURE — 94760 N-INVAS EAR/PLS OXIMETRY 1: CPT

## 2019-06-02 PROCEDURE — 6360000002 HC RX W HCPCS: Performed by: INTERNAL MEDICINE

## 2019-06-02 PROCEDURE — 2580000003 HC RX 258: Performed by: INTERNAL MEDICINE

## 2019-06-02 PROCEDURE — 1200000000 HC SEMI PRIVATE

## 2019-06-02 RX ORDER — ONDANSETRON 2 MG/ML
4 INJECTION INTRAMUSCULAR; INTRAVENOUS EVERY 6 HOURS PRN
Status: DISCONTINUED | OUTPATIENT
Start: 2019-06-02 | End: 2019-06-03 | Stop reason: HOSPADM

## 2019-06-02 RX ORDER — ASPIRIN 81 MG/1
81 TABLET ORAL 2 TIMES DAILY
Qty: 60 TABLET | Refills: 0 | Status: SHIPPED | OUTPATIENT
Start: 2019-06-02 | End: 2019-01-01

## 2019-06-02 RX ORDER — SODIUM CHLORIDE 0.9 % (FLUSH) 0.9 %
10 SYRINGE (ML) INJECTION PRN
Status: DISCONTINUED | OUTPATIENT
Start: 2019-06-02 | End: 2019-06-03 | Stop reason: HOSPADM

## 2019-06-02 RX ORDER — SODIUM CHLORIDE 0.9 % (FLUSH) 0.9 %
10 SYRINGE (ML) INJECTION EVERY 12 HOURS SCHEDULED
Status: DISCONTINUED | OUTPATIENT
Start: 2019-06-02 | End: 2019-06-03 | Stop reason: HOSPADM

## 2019-06-02 RX ADMIN — LEVOFLOXACIN 500 MG: 5 INJECTION, SOLUTION INTRAVENOUS at 18:28

## 2019-06-02 RX ADMIN — SODIUM CHLORIDE: 9 INJECTION, SOLUTION INTRAVENOUS at 05:46

## 2019-06-02 RX ADMIN — Medication 10 ML: at 08:47

## 2019-06-02 RX ADMIN — PANTOPRAZOLE SODIUM 40 MG: 40 TABLET, DELAYED RELEASE ORAL at 06:22

## 2019-06-02 RX ADMIN — Medication 100 MG: at 08:46

## 2019-06-02 RX ADMIN — SODIUM CHLORIDE: 9 INJECTION, SOLUTION INTRAVENOUS at 18:29

## 2019-06-02 RX ADMIN — Medication 10 ML: at 21:32

## 2019-06-02 RX ADMIN — TRAMADOL HYDROCHLORIDE 50 MG: 50 TABLET, FILM COATED ORAL at 21:31

## 2019-06-02 RX ADMIN — ASPIRIN 81 MG: 81 TABLET, COATED ORAL at 08:46

## 2019-06-02 RX ADMIN — METOPROLOL SUCCINATE 50 MG: 50 TABLET, EXTENDED RELEASE ORAL at 08:46

## 2019-06-02 ASSESSMENT — PAIN DESCRIPTION - PAIN TYPE: TYPE: CHRONIC PAIN;ACUTE PAIN

## 2019-06-02 ASSESSMENT — PAIN SCALES - GENERAL
PAINLEVEL_OUTOF10: 4
PAINLEVEL_OUTOF10: 4
PAINLEVEL_OUTOF10: 0
PAINLEVEL_OUTOF10: 0
PAINLEVEL_OUTOF10: 2
PAINLEVEL_OUTOF10: 0

## 2019-06-02 ASSESSMENT — PAIN DESCRIPTION - LOCATION: LOCATION: BACK

## 2019-06-02 ASSESSMENT — PAIN DESCRIPTION - DESCRIPTORS: DESCRIPTORS: ACHING;DISCOMFORT

## 2019-06-02 ASSESSMENT — PAIN DESCRIPTION - FREQUENCY: FREQUENCY: INTERMITTENT

## 2019-06-02 ASSESSMENT — PAIN DESCRIPTION - ORIENTATION: ORIENTATION: LOWER

## 2019-06-02 NOTE — DISCHARGE INSTR - COC
Continuity of Care Form    Patient Name: Neel Deshpande   :  1950  MRN:  [id number]    [facility] date:  2019  Discharge date:  6/3/19    Code Status Order: Full Code   Advance Directives:   Advance Care Flowsheet Documentation     Date/Time Healthcare Directive Type of Healthcare Directive Copy in [address] Agent's Name Healthcare Agent's Phone Number    19 1609  Other (Comment) per pt he may have completed forms, he will talk with fam  --  --  --  --  --    19 0305  No, patient does not have an advance directive for healthcare treatment  --  --  --  --  --          Admitting Physician:  Enma Caban MD  PCP: Enma Caban MD    Discharging Nurse: [address] [place] [address] Unit/Room#: [place] [facility] Phone Number: [phone]    Emergency Contact:   Extended Emergency Contact Information  Primary Emergency Contact: Alfonso Santos  Address: [address] [place] [address] Phone: [phone]  Relation: Other  Secondary Emergency Contact: [address] [place] [address] Phone: [phone]  Relation: Other    Past Surgical History:  History reviewed. No pertinent surgical history.     Immunization History:   Immunization History   Administered Date(s) Administered    Influenza, High Dose (Fluzone 65 yrs and older) 2019       Active Problems:  Patient Active Problem List   Diagnosis Code    Closed compression fracture of L1 lumbar vertebra (HCC) S32.010A    Pancytopenia (HCC) D61.818    Alcohol abuse F10.10    Tobacco abuse Z72.0    COPD (chronic obstructive pulmonary disease) ([address] J44.9    MRSA infection, UTI A49.02    Sepsis ([address] A41.9    Abnormal brain CT R90.89    Acute cystitis without hematuria N30.00    Closed fracture of right inferior pubic ramus ([address] S32.591A       Isolation/Infection:   Isolation          No Isolation        Patient Infection Status     Infection Encounter Level? Onset Date Added Added By Resolved Resolved By Review Date    MRSA No  06/22/18 Jose Carlos Vaughn RN       Urine 6/20/2018          Nurse Assessment:  Last Vital Signs: /75   Pulse 80   Temp 98.9 °F (37.2 °C) (Oral)   Resp 16   Ht 6' 4\" (1.93 m)   Wt 160 lb 15 oz (73 kg)   SpO2 96%   BMI 19.59 kg/m²     Last documented pain score (0-10 scale): Pain Level: 0  Last Weight:   Wt Readings from Last 1 Encounters:   06/02/19 160 lb 15 oz (73 kg)     Mental Status:  oriented and alert    IV Access:  - None    Nursing Mobility/ADLs:  Walking   Assisted  Transfer  Assisted  Bathing  Assisted  Dressing  Assisted  Toileting  Independent  Feeding  Independent  Med Admin  Assisted  Med Delivery   whole    Wound Care Documentation and Therapy:        Elimination:  Continence:   · Bowel: Yes  · Bladder: Yes  Urinary Catheter: None   Colostomy/Ileostomy/Ileal Conduit: No       Date of Last BM: 6/3/2019    Intake/Output Summary (Last 24 hours) at 6/2/2019 1840  Last data filed at 6/2/2019 1837  Gross per 24 hour   Intake 1480 ml   Output 2250 ml   Net -770 ml     I/O last 3 completed shifts: In: 9630 [P.O.:1160; I.V.:560]  Out: 2000 [Urine:2000]    Safety Concerns:     History of Falls (last 30 days)    Impairments/Disabilities:      None    Nutrition Therapy:  Current Nutrition Therapy:   - Oral Diet:  General    Routes of Feeding: Oral  Liquids: Thin Liquids  Daily Fluid Restriction: no  Last Modified Barium Swallow with Video (Video Swallowing Test): not done    Treatments at the Time of Hospital Discharge:   Respiratory Treatments: ***  Oxygen Therapy:  is not on home oxygen therapy.   Ventilator:    - No ventilator support    Rehab Therapies: Physical Therapy, Occupational Therapy and nursing  Weight Bearing Status/Restrictions: No weight bearing restirctions  Other Medical Equipment (for information only, NOT a DME order):  walker  Other Treatments: HOME HEALTH CARE: LEVEL 3 SAFETY   [place] [facility] to establish plan of care for patient over 60 day period   Nursing  Initial home SN evaluation visit to occur within 24-48 hours for:  1)  medication management  2)  VS and clinical assessment  3)  S&S chronic disease exacerbation education + when to contact MD/NP  4)  care coordination  Medication Reconciliation during 1st SN visit  PT/OT/Speech   Evaluations in home within 24-48 hours of discharge to include DME and home safety   Frontload therapy 5 days, then 3x a week   OT to evaluate if patient has [address] needs for personal care    evaluation within 24-48 hours to evaluate resources & insurance for potential AL, [place], LTC, and Medicaid options   Palliative Care referral within 5 days of hospital discharge   PCP Visit scheduled within 3 - 7 days of hospital discharge [address] (If patient is agreeable and meets guidelines)    Patient's personal belongings (please select all that are sent with patient):  None    RN SIGNATURE:  Electronically signed by Terrance Woodruff RN on 6/3/19 at 4:18 PM    CASE MANAGEMENT/SOCIAL WORK SECTION    Inpatient Status Date: 5/30/19    Readmission Risk Assessment Score:  10    Discharging to Facility/ Agency   · Name:  [facility]    · Address: [address] [place] [address], [address] [place] [address] [place] [address]  · Phone: [phone]  · Fax: [phone]    / signature: Electronically signed by JOEL Narvaez on 6/3/19 at 2:10 PM    PHYSICIAN SECTION    Prognosis: Guarded    Condition at Discharge: Stable    Rehab Potential (if transferring to Rehab): Good    Recommended Labs or Other Treatments After Discharge: PT  OT  CBC AND CMP 1 [address]    Physician Certification: I certify the above information and transfer of Jaret Richardson  is necessary for the continuing treatment of the diagnosis listed and that he requires Home Care for greater 30 days.      Update Admission H&P: No change in

## 2019-06-02 NOTE — CONSULTS
Examination of the upper extremities is  unremarkable. X-rays:  AP pelvis and two views of the right hip obtained on 05/30/2019  were extensively reviewed. There is a concern for a right inferior  pubic ramus. CT scan of the pelvis was extensively reviewed and the CT  scan of the pelvis/hip revealed a right inferior pubic ramus fracture. There is evidence of a chronic L5 compression fracture with  approximately 40% loss of the vertebral height. IMPRESSION:  Right inferior pubic ramus fracture. PLAN:  At this time, the patient may weightbear as tolerated. It would  be recommended that the patient use a walker for stability. The patient  may work on general strengthening. The patient will follow up with me  in approximately three to four weeks and we will reassess him then. A  followup AP pelvis x-ray will be obtained.         Carla Ochoa MD    D: 06/01/2019 18:02:44       T: 06/01/2019 18:13:48     [email or web address]  Job#: [id number]     Doc#: [id number]    CC:

## 2019-06-02 NOTE — PROGRESS NOTES
Department of Internal Medicine  General Internal Medicine  Attending Progress Note      SUBJECTIVE:  Pt has some confusion, wanted to leave, has Hx of alcohol abuse, we concerns about DT. Start CIWA protocoll.  Afebrile,    OBJECTIVE      Medications    Current Facility-Administered Medications: sodium chloride flush 0.9 % injection 10 mL, 10 mL, Intravenous, 2 times per day  sodium chloride flush 0.9 % injection 10 mL, 10 mL, Intravenous, PRN  ondansetron (ZOFRAN) injection 4 mg, 4 mg, Intravenous, Q6H PRN  enoxaparin (LOVENOX) injection 40 mg, 40 mg, Subcutaneous, Daily  traMADol (ULTRAM) tablet 50 mg, 50 mg, Oral, Q8H PRN  nicotine (NICODERM CQ) 21 MG/24HR 1 patch, 1 patch, Transdermal, Daily  albuterol sulfate  (90 Base) MCG/ACT inhaler 2 puff, 2 puff, Inhalation, 4x Daily PRN  aspirin EC tablet 81 mg, 81 mg, Oral, Daily  metoprolol succinate (TOPROL XL) extended release tablet 50 mg, 50 mg, Oral, Daily  vitamin B-1 (THIAMINE) tablet 100 mg, 100 mg, Oral, Daily  sodium chloride flush 0.9 % injection 10 mL, 10 mL, Intravenous, 2 times per day  sodium chloride flush 0.9 % injection 10 mL, 10 mL, Intravenous, PRN  magnesium hydroxide (MILK OF MAGNESIA) 400 MG/5ML suspension 30 mL, 30 mL, Oral, Daily PRN  pantoprazole (PROTONIX) tablet 40 mg, 40 mg, Oral, QAM AC  0.9 % sodium chloride infusion, , Intravenous, Continuous  levofloxacin (LEVAQUIN) 500 MG/100ML infusion 500 mg, 500 mg, Intravenous, Q24H  Physical    VITALS:  /74   Pulse 83   Temp 98.3 °F (36.8 °C) (Oral)   Resp 15   Ht 6' 4\" (1.93 m)   Wt 160 lb 15 oz (73 kg)   SpO2 95%   BMI 19.59 kg/m²   CONSTITUTIONAL:  awake, alert, cooperative, no apparent distress, and appears stated age  LUNGS:  No increased work of breathing, good air exchange, clear to auscultation bilaterally, no crackles or wheezing  CARDIOVASCULAR:  Normal apical impulse, regular rate and rhythm, normal S1 and S2, no S3 or S4, and no murmur noted  ABDOMEN:  No scars, normal bowel sounds, soft, non-distended, non-tender, no masses palpated, no hepatosplenomegally  MUSCULOSKELETAL:  Hip pain,  Data    CBC:   Lab Results   Component Value Date    WBC 3.2 05/31/2019    RBC 3.65 05/31/2019    HGB 12.5 05/31/2019    HCT 37.2 05/31/2019    .9 05/31/2019    MCH 34.2 05/31/2019    MCHC 33.6 05/31/2019    RDW 14.2 05/31/2019    PLT 51 05/31/2019    MPV 10.7 05/31/2019     BMP:    Lab Results   Component Value Date     05/31/2019    K 4.2 06/01/2019    K 2.8 06/21/2018    CL 99 05/31/2019    CO2 21 05/31/2019    BUN 16 05/31/2019    LABALBU 3.3 05/31/2019    CREATININE 0.5 05/31/2019    CALCIUM 8.4 05/31/2019    GFRAA >60 05/31/2019    LABGLOM >60 05/31/2019    GLUCOSE 114 05/31/2019     Summary   Left ventricular cavity size is normal.   There is mild concentric left ventricular hypertrophy.   Ejection fraction is visually estimated to be   40%.   A false cord is noted in the left ventricle (normal variant).   Left atrium is of normal size.   A bubble study was performed and fails to show evidence of shunting.   Mitral annular calcification is present.   Mitral valve leaflets appear mildly thickened.   Mild mitral regurgitation.   Aortic valve appears sclerotic but opens adequately.   No evidence of aortic valve regurgitation. ASSESSMENT AND PLAN      Patient Active Hospital Problem List:   Sepsis ([address] (5/30/2019)    Blood Cx is neg. Cont antibiotic therapy. Abnormal brain CT ()    PT/OT cont   Acute cystitis without hematuria ()   cont med,    Closed fracture of right inferior pubic ramus (HCC) () Appreciate ortho consult, no surgical intervention for now. cont pain med, PT/OT  Hx of alcohol use,  Start CIWA protocoll. COPD cont med,   R/o CHF  Echo  Report above, EF 40 %,  LVH  Dr Kem Stanley, covering dr Sundar Bill.

## 2019-06-02 NOTE — PLAN OF CARE
Problem: Falls - Risk of:  Goal: Will remain free from falls  Description  Will remain free from falls  6/1/2019 2347 by Marlon Stanley RN  Outcome: Ongoing  6/1/2019 1317 by Ramírez Holliday RN  Outcome: Ongoing     Problem: Pain:  Goal: Pain level will decrease  Description  Pain level will decrease  6/1/2019 2347 by Marlon Stanley RN  Outcome: Ongoing  6/1/2019 1317 by Ramírez Holliday RN  Outcome: Ongoing     Problem: Pain:  Goal: Control of acute pain  Description  Control of acute pain  6/1/2019 2347 by Marlon Stanley RN  Outcome: Ongoing  6/1/2019 1317 by Ramírez Holliday RN  Outcome: Ongoing     Problem: Risk for Impaired Skin Integrity  Goal: Tissue integrity - skin and mucous membranes  Description  Structural intactness and normal physiological function of skin and  mucous membranes.   6/1/2019 2347 by Marlon Stanley RN  Outcome: Ongoing  6/1/2019 1317 by Ramírez Holliday RN  Outcome: Ongoing     Problem: Pain:  Goal: Control of chronic pain  Description  Control of chronic pain  6/1/2019 1317 by Ramírez Holliday RN  Outcome: Ongoing

## 2019-06-02 NOTE — PROGRESS NOTES
Patient is looking out the window at the red lights and states the red lights are people with a vest and they are fishing in the holding basin. CIWA score of 6 at this time. Coarse expiratory wheezes heard throughout. Assisted patient to bathroom - patient had disconnected the IV - stating it fell out. Underwear saturated - bed pad saturated in urine. Removed underwear and pad. Tramadol given for pain of 4/10. Patient c/o pain when walking with walker, appeared steady. Patgient had a BM.

## 2019-06-03 VITALS
HEART RATE: 71 BPM | DIASTOLIC BLOOD PRESSURE: 72 MMHG | HEIGHT: 76 IN | OXYGEN SATURATION: 96 % | SYSTOLIC BLOOD PRESSURE: 116 MMHG | WEIGHT: 157.41 LBS | RESPIRATION RATE: 16 BRPM | TEMPERATURE: 98.6 F | BODY MASS INDEX: 19.17 KG/M2

## 2019-06-03 PROCEDURE — 99238 HOSP IP/OBS DSCHRG MGMT 30/<: CPT | Performed by: INTERNAL MEDICINE

## 2019-06-03 PROCEDURE — 2580000003 HC RX 258: Performed by: INTERNAL MEDICINE

## 2019-06-03 PROCEDURE — 97530 THERAPEUTIC ACTIVITIES: CPT

## 2019-06-03 PROCEDURE — 6370000000 HC RX 637 (ALT 250 FOR IP): Performed by: INTERNAL MEDICINE

## 2019-06-03 PROCEDURE — 94760 N-INVAS EAR/PLS OXIMETRY 1: CPT

## 2019-06-03 PROCEDURE — 97535 SELF CARE MNGMENT TRAINING: CPT

## 2019-06-03 RX ORDER — LEVOFLOXACIN 500 MG/1
500 TABLET, FILM COATED ORAL DAILY
Status: DISCONTINUED | OUTPATIENT
Start: 2019-06-03 | End: 2019-06-03 | Stop reason: HOSPADM

## 2019-06-03 RX ORDER — TRAMADOL HYDROCHLORIDE 50 MG/1
50 TABLET ORAL EVERY 6 HOURS PRN
Qty: 28 TABLET | Refills: 0 | Status: SHIPPED | OUTPATIENT
Start: 2019-06-03 | End: 2019-06-10

## 2019-06-03 RX ORDER — NICOTINE 21 MG/24HR
1 PATCH, TRANSDERMAL 24 HOURS TRANSDERMAL DAILY
Qty: 30 PATCH | Refills: 3 | Status: SHIPPED | OUTPATIENT
Start: 2019-06-04 | End: 2019-01-01

## 2019-06-03 RX ORDER — LEVOFLOXACIN 500 MG/1
500 TABLET, FILM COATED ORAL DAILY
Qty: 5 TABLET | Refills: 0 | Status: SHIPPED | OUTPATIENT
Start: 2019-06-03 | End: 2019-06-08

## 2019-06-03 RX ADMIN — METOPROLOL SUCCINATE 50 MG: 50 TABLET, EXTENDED RELEASE ORAL at 08:34

## 2019-06-03 RX ADMIN — Medication 100 MG: at 08:34

## 2019-06-03 RX ADMIN — SODIUM CHLORIDE: 9 INJECTION, SOLUTION INTRAVENOUS at 08:36

## 2019-06-03 RX ADMIN — ASPIRIN 81 MG: 81 TABLET, COATED ORAL at 08:34

## 2019-06-03 RX ADMIN — LEVOFLOXACIN 500 MG: 500 TABLET, FILM COATED ORAL at 14:30

## 2019-06-03 RX ADMIN — PANTOPRAZOLE SODIUM 40 MG: 40 TABLET, DELAYED RELEASE ORAL at 06:45

## 2019-06-03 ASSESSMENT — PAIN DESCRIPTION - ONSET: ONSET: ON-GOING

## 2019-06-03 ASSESSMENT — PAIN DESCRIPTION - LOCATION: LOCATION: HIP;BACK

## 2019-06-03 ASSESSMENT — PAIN DESCRIPTION - ORIENTATION: ORIENTATION: RIGHT;LOWER

## 2019-06-03 ASSESSMENT — PAIN DESCRIPTION - FREQUENCY: FREQUENCY: CONTINUOUS

## 2019-06-03 ASSESSMENT — PAIN DESCRIPTION - DESCRIPTORS: DESCRIPTORS: ACHING;DISCOMFORT

## 2019-06-03 ASSESSMENT — PAIN SCALES - GENERAL
PAINLEVEL_OUTOF10: 3
PAINLEVEL_OUTOF10: 0

## 2019-06-03 ASSESSMENT — PAIN DESCRIPTION - PAIN TYPE: TYPE: ACUTE PAIN;CHRONIC PAIN

## 2019-06-03 NOTE — CARE COORDINATION
CARLOS met with patient to discuss dc planned for today. He confirms his aunt will transport him at discharge. Has no concerns with this and feels he is safe to transfer in/out of car and into his home. IMM presented. [facility] for home care.     Electronically signed by JOEL Cleveland on 6/3/2019 at 3:11 PM

## 2019-06-03 NOTE — PLAN OF CARE
Problem: Falls - Risk of:  Goal: Will remain free from falls  Description  Will remain free from falls  6/3/2019 0945 by Clarke Sargent RN  Outcome: Ongoing  Goal: Absence of physical injury  Description  Absence of physical injury  Outcome: Ongoing   Problem: Pain:  Goal: Pain level will decrease  Description  Pain level will decrease  6/3/2019 0945 by Clarke Sargent RN  Outcome: Ongoing  Goal: Control of acute pain  Description  Control of acute pain  6/3/2019 0945 by Clarke Sargent RN  Outcome: Ongoing  Goal: Control of chronic pain  Description  Control of chronic pain  6/3/2019 0945 by Clarke Sargent RN  Outcome: Ongoing  Problem: Risk for Impaired Skin Integrity  Goal: Tissue integrity - skin and mucous membranes  Description  Structural intactness and normal physiological function of skin and  mucous membranes. Outcome: Ongoing     Problem: SAFETY  Goal: Free from accidental physical injury  Outcome: Ongoing  Goal: Free from intentional harm  Outcome: Ongoing     Problem: DAILY CARE  Goal: Daily care needs are met  Outcome: Ongoing     Problem: KNOWLEDGE DEFICIT  Goal: Patient/S.O. demonstrates understanding of disease process, treatment plan, medications, and discharge instructions.   Outcome: Ongoing     Problem: DISCHARGE BARRIERS  Goal: Patient's continuum of care needs are met  Outcome: Ongoing

## 2019-06-03 NOTE — PROGRESS NOTES
reach;Chair alarm in place; Left in chair;Gait belt;Nurse notified(MEME Augustine notified )         Patient Diagnosis(es): The primary encounter diagnosis was Sepsis, due to unspecified organism [facility]). Diagnoses of Acute cystitis without hematuria, Closed fracture of right inferior pubic ramus, initial encounter ([facility] Abnormal brain CT, Thrombocytopenia ([facility] Alcohol abuse, and Vision changes were also pertinent to this visit. has a past medical history of Alcohol abuse, COPD (chronic obstructive pulmonary disease) ([facility] and Tobacco abuse.   has no past surgical history on file. Restrictions  Restrictions/Precautions: Fall Risk  Other position/activity restrictions: pubic ramus fracture    Subjective   Chart Reviewed: Yes  Patient assessed for rehabilitation services?: Yes  Additional Pertinent Hx: Patient is a 77 y.o. male who presents d/t a fall at home; admitted for management of Closed fracture of right inferior pubic ramus. MRI indicated possible subacute area of infarct.    Response to previous treatment: Patient with no complaints from previous session  Family / Caregiver Present: No  Referring Practitioner: Александр Diaz MD  Subjective: Patient met b/s, lying supine, agreeable to OOB, reports no pain at rest, yet indicates pelvis pain with movement-does not rate, reports had pain meds earlier     Objective    ADL  Grooming: Setup(comb hair, declines oral care )  UE Dressing: (BM all over hospital gown-gown changed )  LE Dressing: Setup(dons socks and underwear in long sitting in bed with set up )    Balance  Sitting Balance: Supervision  Standing Balance: Stand by assistance    Transfers  Sit to stand: Stand by assistance  Stand to sit: Stand by assistance    Functional Mobility  Functional - Mobility Device: Rolling Walker  Activity: Other  Assist Level: Contact guard assistance  Functional Mobility Comments: fxl mobility within room and hallway x 75 feet with RW with CGA     Bed mobility  Supine to Sit: Supervision(HOB up, use of HR )    Cognition  Overall Cognitive Status: Exceptions  Safety Judgement: Decreased awareness of need for assistance;Decreased awareness of need for safety  Insights: Decreased awareness of deficits  Cognition Comment: [place], needed to repeat frequently      Plan  Times per week: 3-5  Times per day: Daily  Current Treatment Recommendations: Strengthening, ROM, Balance Training, Functional Mobility Training, Endurance Training, Safety Education & Training, Patient/Caregiver Education & Training, Equipment Evaluation, Education, & procurement, Self-Care / ADL    Goals  Short term goals  Time Frame for Short term goals: until d/c: status as of 6/3/19: goals ongoing   Short term goal 1: Fxl mobility/transfers via RW mod I. Short term goal 2: Bathing with SPV. Short term goal 3: Dressing with SPV. Short term goal 4: Toileting with SPV. Short term goal 5: Grooming mod I. Patient Goals   Patient goals : \"To get back home\".         Therapy Time   Individual Concurrent Group Co-treatment   Time In 1031         Time Out 1055         Minutes 24         Timed Code Treatment Minutes: 1825 [facility] 1780 [address]

## 2019-06-03 NOTE — CARE COORDINATION
PT/OT rec home care. Per chart review, patient wanting [facility]. Referral called to [facility] with [facility], VM left    Electronically signed by JOEL Cote on 6/3/2019 at 11:29 AM        Call to [facility] with [facility] to inform of dc today.     Electronically signed by JOEL Cote on 6/3/2019 at 2:46 PM

## 2019-06-03 NOTE — PLAN OF CARE
Problem: Falls - Risk of:  Goal: Absence of physical injury  Description  Absence of physical injury  6/2/2019 1845 by Karla Padron RN  Outcome: Met This Shift     Problem: Risk for Impaired Skin Integrity  Goal: Tissue integrity - skin and mucous membranes  Description  Structural intactness and normal physiological function of skin and  mucous membranes. 6/2/2019 1845 by Karla Padron RN  Outcome: Met This Shift     Problem: SAFETY  Goal: Free from accidental physical injury  6/2/2019 1845 by Karla Padron RN  Outcome: Met This Shift     Problem: SAFETY  Goal: Free from intentional harm  6/2/2019 1845 by Karla Padron RN  Outcome: Met This Shift     Problem: SAFETY  Goal: Free from intentional harm  6/2/2019 1845 by Karla Padron RN  Outcome: Met This Shift     Problem: DAILY CARE  Goal: Daily care needs are met  6/2/2019 1845 by Karla Padron RN  Outcome: Met This Shift     Problem: KNOWLEDGE DEFICIT  Goal: Patient/S.O. demonstrates understanding of disease process, treatment plan, medications, and discharge instructions. 6/2/2019 1845 by Karla Padron RN  Outcome: Met This Shift     Problem: KNOWLEDGE DEFICIT  Goal: Patient/S.O. demonstrates understanding of disease process, treatment plan, medications, and discharge instructions.   6/2/2019 1845 by Karla Padron RN  Outcome: Met This Shift

## 2019-06-03 NOTE — CARE COORDINATION
[facility]  Received referral from case management [address] with patent regarding [facility] services, Faxed orders to [address] care to see patient by   6/5/19  Tanja Rose LPN    [address] Cell [phone], Fax [phone]

## 2019-06-03 NOTE — PROGRESS NOTES
Patient has discharge order in for today. Patient denies any concerns with discharge. Discharge paperwork and new medications reviewed with patient. PAtient verbalized understanding. PIV removed,required core measures completed.

## 2019-06-03 NOTE — PROGRESS NOTES
Assisted patient to bathroom for BM - patient incontinent of stool on the way to the bathroom - had extra-large, soft, formed tan-brown, BM in [facility]. Applied bed extender to bed, made bed, attached A-boots to bed and zeroed out bed.

## 2019-06-03 NOTE — DISCHARGE INSTR - DIET

## 2019-06-04 ENCOUNTER — CARE COORDINATION (OUTPATIENT)
Dept: CASE MANAGEMENT | Age: 69
End: 2019-06-04

## 2019-06-04 LAB
BLOOD CULTURE, ROUTINE: NORMAL
CULTURE, BLOOD 2: NORMAL

## 2019-06-04 NOTE — CARE COORDINATION
[address] Transitions Initial Follow Up Call    Call within 2 business days of discharge: Yes    Patient: Alisa Arevalo Patient : 1950   MRN: [id number]  Reason for Admission: sepsis, acute cystitis  Discharge Date: 6/3/19 RARS: Readmission Risk Score: 10      Last Discharge [address]       Complaint Diagnosis Description Type Department Provider    19 Hip Pain; Loss of Vision Sepsis, due to unspecified organism ([facility] . .. ED to Hosp-Admission (Discharged) (ADMITTED) Giancarlo Paredes MD; [address] [place] [address]. .. Spoke with: patient, [facility]    Non-face-to-face services provided:  Obtained and reviewed discharge summary and/or continuity of care documents  Assessment and support for treatment adherence and medication management-New medications reviewed    Care Transitions 24 Hour Call    Do you have any ongoing symptoms?:  Yes  Patient-reported symptoms:  Pain  Do you have all of your prescriptions and are they filled?:  Yes  Have you scheduled your follow up appointment?:  No  Were you discharged with any Home Care or Post Acute Services:  Yes  Care Transitions Interventions       Spoke with patient, Gregg Shane, who states his \"hip hurts\", besides that he \"feels good\". Good appetite and no issues with urination, states urine is straw color. He states does not have a temperature. He has a right hip fracture that he states does not require surgery. Discussed use of pain medication, patient stated he took ultram this morning and it helped. Encouraged patient to use pain medication as prescribed. Patient also stated he had started taking his levaquin, he stated would not used the nicoderm patch, that he had tried a patch in the past and it did not help. Patient also stated he was \"not going to quit smoking\". Patient stated he has decreased the amount that he smokes. Patient declined a complete med. Review so only new medications were reviewed. Offered to make PCP appointment for patient, Ainsley Felixchad said he would call Dr. Jaswinder Duckworth and try to make an appointment within 7 days. Also stated he would call Dr. Avila Greene for F/U ortho. Appointment. Patient states had not received a call from [facility]. Writer told patient would call [facility] and check on referral.  Patient expressed appreciation for that. Denies any needs at present time. Agreeable to f/u calls. Called Candace Foster verified referral and stated someone would be calling patient today.           Follow Up  Future Appointments   Date Time Provider Tomeka Lopez   7/17/2019 10:00 AM Stacy Guerra  N Jerson Morales RN

## 2019-06-17 ENCOUNTER — CARE COORDINATION (OUTPATIENT)
Dept: CASE MANAGEMENT | Age: 69
End: 2019-06-17

## 2019-06-27 NOTE — DISCHARGE SUMMARY
Standing     Number of Occurrences:   1     Order Specific Question:   Reason for exam:     Answer:   tachycardia    CT HIP RIGHT WO CONTRAST     Standing Status:   Standing     Number of Occurrences:   1    MRI BRAIN WO CONTRAST     Standing Status:   Standing     Number of Occurrences:   1    CBC     Standing Status:   Standing     Number of Occurrences:   1    Comprehensive Metabolic Panel     Standing Status:   Standing     Number of Occurrences:   1    Protime-INR     Standing Status:   Standing     Number of Occurrences:   1    APTT     Standing Status:   Standing     Number of Occurrences:   1    Urinalysis Reflex to Culture     Standing Status:   Standing     Number of Occurrences:   1    Lactic Acid, Plasma     Standing Status:   Standing     Number of Occurrences:   1    CK     Standing Status:   Standing     Number of Occurrences:   1    Microscopic Urinalysis     Standing Status:   Standing     Number of Occurrences:   1    Lactate, Sepsis     Standing Status:   Standing     Number of Occurrences:   2    CBC auto differential     Standing Status:   Standing     Number of Occurrences:   1    Comprehensive Metabolic Panel     Standing Status:   Standing     Number of Occurrences:   1    Sedimentation Rate     Standing Status:   Standing     Number of Occurrences:   1    C-Reactive Protein     Standing Status:   Standing     Number of Occurrences:   1    Potassium     Standing Status:   Standing     Number of Occurrences:   1    Inpatient consult to Primary Care Provider     Standing Status:   Standing     Number of Occurrences:   1     Order Specific Question:   Reason for Consult? Answer:   admission    Inpatient consult to Spiritual Services     Standing Status:   Standing     Number of Occurrences:   1     Order Specific Question:   Reason for Consult?      Answer:   adv directive info    Inpatient consult to Social Work     Standing Status:   Standing     Number of Occurrences: 1     Order Specific Question:   Reason for Consult? Answer:   placement    Inpatient consult to Neurology     Standing Status:   Standing     Number of Occurrences:   1     Order Specific Question:   Reason for Consult? Answer:   abnormal ct    Inpatient consult to Orthopedic Surgery     Standing Status:   Standing     Number of Occurrences:   1     Order Specific Question:   Reason for Consult? Answer:   acute FX right inferior pubic ramus    Inpatient consult to Home Care Needs     Physician to Follow Referral: Jose Kessler MD    I certify that I, or a nurse practioner or physician assistant working with me, had an in-person encounter with Endy Menchaca on 6/3/2019 and the reason for the home care services is documented in the clinical note for that day. Endy Menchaca was assessed on the above date and was found to have medical conditions requiring Home Care that included cbc and cmp 1 wk. Homebound Status: further, I certify that my clinical findings support that Endy Menchaca does meet the Medicare definition of \"Confined to the Home\" because of   Deconditioned due to medical condition     Certification and medical necessity: I certify that, based on my findings, the following services are medically necessary home health services for Endy Menchaca for the following reasons:  - Nurse  - Medication compliance and education for  changes in previous medications  PT-OT Eval and Treat     Standing Status:   Standing     Number of Occurrences:   1     Order Specific Question:   Reason for Consult? Answer:   Home Care Orders    OT eval and treat     Standing Status:   Standing     Number of Occurrences:   1    PT eval and treat     Standing Status:   Standing     Number of Occurrences:   1    EKG 12 Lead     Show EKG to PHYSICIAN     Standing Status:   Standing     Number of Occurrences:   1     Order Specific Question:   Reason for Exam?     Answer:    Other    Echo Complete     Criteria for performing Bubble Study include: 1) Reason for exam of: presence or history of Cerebral Vascular Accident, Transient Ischemic Attack, Patent Foramen Ovale, Atrial Septal Defect, Rule Out Source of Emboli, Interatrial Septal (IAS) Aneurysm, Visual Disturbances OR 2) Findings on Initial Echo Exam of: bowing of the IAS, IAS deviated in a fixed position to the left or right, questionable color flow across the septum in any view, or enlarged right atrium or ventricle without evidence of Pulmonary Hypertension or moderate to severe Tricuspid Regurgitation     Standing Status:   Standing     Number of Occurrences:   1     Order Specific Question:   Reason for exam:     Answer:   stroke     Order Specific Question:   Perform Bubble Study? Answer:   Yes, if indicated (see Comments)    PATIENT STATUS (FROM ED OR OR/PROCEDURAL) Inpatient     Standing Status:   Standing     Number of Occurrences:   1     Order Specific Question:   Patient Class     Answer:   Inpatient [101]     Order Specific Question:   REQUIRED: Diagnosis     Answer:   Sepsis [facility]) [[id number]]     Order Specific Question:   Estimated Length of Stay     Answer:   Estimated stay of more than 2 midnights     Order Specific Question:   Future Attending Provider     Answer:   Alycia Ya [[id number]]    Discharge patient     Standing Status:   Standing     Number of Occurrences:   1     Order Specific Question:   Discharge Disposition     Answer:   Home       No current facility-administered medications for this encounter. Current Outpatient Medications   Medication Sig Dispense Refill    nicotine (NICODERM CQ) 21 MG/24HR Place 1 patch onto the skin daily 30 patch 3    aspirin EC 81 MG EC tablet Take 1 tablet by mouth 2 times daily For 30 days post fractures for DVT blood clot prophylaxis  Please avoid missing doses.  60 tablet 0    acetaminophen (TYLENOL) 500 MG tablet Take 1,000 mg by mouth 2 times daily as needed for Pain      metoprolol succinate (TOPROL XL) 50 MG extended release tablet Take 1 tablet by mouth daily 30 tablet 3    Multiple Vitamins-Minerals (THERAPEUTIC MULTIVITAMIN-MINERALS) tablet Take 1 tablet by mouth daily 30 tablet 11    omeprazole (PRILOSEC) 20 MG delayed release capsule Take 1 capsule by mouth daily 30 capsule 3    vitamin B-1 (THIAMINE) 100 MG tablet Take 1 tablet by mouth daily 30 tablet 3    albuterol sulfate  (90 Base) MCG/ACT inhaler Inhale 2 puffs into the lungs 4 times daily as needed for Wheezing 1 Inhaler 5       Orders Placed This Encounter   Medications    0.9 % sodium chloride bolus    morphine (PF) injection 2 mg    cefTRIAXone (ROCEPHIN) 1 g IVPB in 50 mL D5W minibag    0.9 % sodium chloride bolus    DISCONTD: albuterol sulfate  (90 Base) MCG/ACT inhaler 2 puff    DISCONTD: aspirin EC tablet 81 mg    DISCONTD: metoprolol succinate (TOPROL XL) extended release tablet 50 mg    DISCONTD: vitamin B-1 (THIAMINE) tablet 100 mg    morphine (PF) injection 2 mg    DISCONTD: sodium chloride flush 0.9 % injection 10 mL    DISCONTD: sodium chloride flush 0.9 % injection 10 mL    DISCONTD: magnesium hydroxide (MILK OF MAGNESIA) 400 MG/5ML suspension 30 mL    DISCONTD: ondansetron (ZOFRAN) injection 4 mg    DISCONTD: enoxaparin (LOVENOX) injection 40 mg    DISCONTD: pantoprazole (PROTONIX) tablet 40 mg    DISCONTD: 0.9 % sodium chloride infusion    DISCONTD: levofloxacin (LEVAQUIN) 500 MG/100ML infusion 500 mg    DISCONTD: nicotine (NICODERM CQ) 21 MG/24HR 1 patch    OR Linked Order Group     potassium chloride (KLOR-CON M) extended release tablet 40 mEq     potassium bicarb-citric acid (EFFER-K) effervescent tablet 40 mEq     potassium chloride 10 mEq/100 mL IVPB (Peripheral Line)    DISCONTD: traMADol (ULTRAM) tablet 50 mg    DISCONTD: sodium chloride flush 0.9 % injection 10 mL    DISCONTD: sodium chloride flush 0.9 % injection 10 mL    DISCONTD: ondansetron (ZOFRAN) injection 4 mg    DISCONTD: enoxaparin (LOVENOX) injection 40 mg    aspirin EC 81 MG EC tablet     Sig: Take 1 tablet by mouth 2 times daily For 30 days post fractures for DVT blood clot prophylaxis  Please avoid missing doses. Dispense:  60 tablet     Refill:  0    DISCONTD: levofloxacin (LEVAQUIN) tablet 500 mg    nicotine (NICODERM CQ) 21 MG/24HR     Sig: Place 1 patch onto the skin daily     Dispense:  30 patch     Refill:  3    levofloxacin (LEVAQUIN) 500 MG tablet     Sig: Take 1 tablet by mouth daily for 5 days     Dispense:  5 tablet     Refill:  0    traMADol (ULTRAM) 50 MG tablet     Sig: Take 1 tablet by mouth every 6 hours as needed for Pain for up to 7 days. Dispense:  28 tablet     Refill:  0     Reduce doses taken as pain becomes manageable           Objective:   Vitals: /72   Pulse 71   Temp 98.6 °F (37 °C) (Oral)   Resp 16   Ht 6' 4\" (1.93 m)   Wt 157 lb 6.5 oz (71.4 kg)   SpO2 96%   BMI 19.16 kg/m²   General appearance: alert,awake,oriented x 3 and cooperative with exam  HEENT: Head: Normal, normocephalic, atraumatic, anicteric, pupils are reactive to light. Dry mucous membrane.   Neck: no adenopathy, no carotid bruit, supple, symmetrical, trachea midline and thyroid not enlarged, symmetric, no tenderness/mass/nodules  Lungs: clear  Heart: regular rate and rhythm, S1, S2 normal, no murmur, click, rub or gallop  Abdomen: soft, non-tender; bowel sounds normal; no masses,  no organomegaly  Extremities: extremities painful rom of the lower extremities   Neurologic: Mental status: Alert, oriented, thought content appropriate    Assessment and Plan:   Pubic rami fx  Alcohol abuse  Sepsis    Patient Active Problem List:     Closed compression fracture of L1 lumbar vertebra     Pancytopenia (HCC)     Alcohol abuse     Tobacco abuse     COPD (chronic obstructive pulmonary disease) ([facility])     MRSA infection, UTI     Sepsis ([facility] [address]     Abnormal brain CT     Acute cystitis without hematuria     Closed fracture of right inferior pubic ramus (HCC)    Pt is stable. Discussed with staff and pt about the plan. See the orders for further plan. Will proceed further acc to pt's progress. Time spent with management of the pt is- 20 mts  Follow up in office in 1 wk. See the [facility] [place] and [facility] rec forms  Follow up with specialists as instructed by them. Advised the pt to call me in case of questions or worsening of symptoms. Pt voiced understanding of the instructions.   Condition and prognosis is guarded      Electronically signed by: Jacklyn Dobbs MD, on 6/27/2019, at 9:30 AM

## 2019-06-27 NOTE — CARE COORDINATION
[address] [facility] Transitions Follow Up Call    2019    Patient: Jaret Richardson  Patient : 1950   MRN: [id number]  Reason for Admission:   Discharge Date: 6/3/19 RARS: Readmission Risk Score: 10         Spoke with: no one    Care Transitions Subsequent and Final Call    Subsequent and Final Calls  Care Transitions Interventions  Other Interventions: Follow Up: Final attempt at [facility] outreach. Unable to reach patient. Unable to leave message. Line consistently busy.   Future Appointments   Date Time Provider Tomeka Guevaraisti   2019 10:00 AM Shey Leung MD AFL CHE MED [facility] REA   2019  3:30 PM Shey Leung MD [id number] RN

## 2019-06-29 PROBLEM — F10.931 WITHDRAWAL SYMPTOMS, ALCOHOL, WITH DELIRIUM (HCC): Status: ACTIVE | Noted: 2019-01-01

## 2019-06-29 NOTE — ED PROVIDER NOTES
[facility]  eMERGENCY dEPARTMENT eNCOUnter      Pt Name: Sherryle Plane  MRN: [id number]  Armssrinivasagfadeel 1950  Date of evaluation: 6/29/2019  Provider: Deneen Braga DO    CHIEF COMPLAINT       Chief Complaint   Patient presents with    Altered Mental Status     found this AM by room mate. per [facility] pt was confused to time. no NIH was performed by EMS         HISTORY OF PRESENT ILLNESS   (Location/Symptom, Timing/Onset, Context/Setting, Quality, Duration, Modifying Factors, Severity)  Note limiting factors. Sherryle Plane is a 77 y.o. male who presents to the emergency department with complaint of altered mental status. The patient was found confused by his roommate today. The patient is able to answer questions and reports that he has not been feeling well for a couple of weeks. He has had loss of appetite and does not feel like eating. He reports weight loss. He reports general weakness. Paramedics report that the patient was oriented to person and place only. He was covered in stool when they found him. He admits to falling a few days ago and striking his head but denies any loss of consciousness. The patient is known to be an alcoholic. He drinks vodka on a daily basis but has not had any alcohol for 3 days. He denies any drug use. He denies ingestion of any medications chemicals or substances. He denies any suicidal or homicidal ideations. He denies any auditory or visual hallucinations. He denies any headache or neck pain. Denies any chest pain or shortness of breath. No abdominal pain vomiting or diarrhea. He denies any dysuria hematuria frequency or urgency. He does have COPD and smokes tobacco but does not wear oxygen. He has taken aspirin in the past but has not been taking his aspirin lately. HPI    Nursing Notes were reviewed.     REVIEW OF SYSTEMS    (2-9 systems for level 4, 10 or more for level 5)       Constitutional: procedures. There was a high probability of clinically significant/life threatening deterioration in the patient's condition which required my urgent intervention. CONSULTS:  IP CONSULT TO PRIMARY CARE PROVIDER    PROCEDURES:  Unless otherwise noted below, none     Procedures        FINAL IMPRESSION      1. Altered mental status, unspecified altered mental status type    2. Acute hyperactive alcohol withdrawal delirium (HCC)    3. Hypokalemia    4. Hypomagnesemia    5. Candidal dermatitis    6. Alcohol abuse          DISPOSITION/PLAN   DISPOSITION        PATIENT REFERRED TO:  No follow-up provider specified. DISCHARGE MEDICATIONS:  New Prescriptions    No medications on file     Controlled Substances Monitoring:     No flowsheet data found. (Please note that portions of this note were completed with a voice recognition program.  Efforts were made to edit the dictations but occasionally words are mis-transcribed. )    [id number] Braga DO (electronically signed)  Attending Emergency Physician          Aris Bunn DO  06/29/19 1567

## 2019-06-30 NOTE — H&P
[address] [place], [place] [address]                              HISTORY AND PHYSICAL    PATIENT NAME: Elijah Owen                  :        1950  MED REC NO:   [id number]                          ROOM:       2406  ACCOUNT NO:   [de-identified]                           ADMIT DATE: 2019  PROVIDER:     Karly Branch MD    ATTENDING PHYSICIAN:  Dr. Faustina Miguel. CHIEF COMPLAINT:  Confusion, alcohol intoxication. HISTORY OF PRESENT ILLNESS:  This 26-year-old male patient with history  of alcohol abuse came to the emergency room because of some confusion. The patient likes vodka and tequila. The patient also had a fall and  complaining of some hip and neck pain. The patient had an x-ray and  does not show any fracture. The patient was admitted for treatment of  alcohol withdrawal.  The patient denies any chest pain, nausea, vomiting  abdominal pain. No hallucination. The patient is a smoker. PAST MEDICAL HISTORY:  Significant for alcohol use, smoking, COPD,  history of compression fracture of L1, history of cystitis, and fracture  of the ramus pubis on the right side, and pancytopenia. ALLERGIES:  No known allergies. CURRENT MEDICATIONS:  See the reconciliation sheet.   Current Facility-Administered Medications   Medication Dose Route Frequency Provider Last Rate Last Dose    potassium chloride (KLOR-CON M) extended release tablet 40 mEq  40 mEq Oral PRN Karly Branch MD        Or    potassium bicarb-citric acid (EFFER-K) effervescent tablet 40 mEq  40 mEq Oral PRN Karly Branch MD        Or    potassium chloride 10 mEq/100 mL IVPB (Peripheral Line)  10 mEq Intravenous PRN Karly Branch MD        acetaminophen (TYLENOL) tablet 650 mg  650 mg Oral Q8H PRN Karly Branch MD        metoprolol succinate (TOPROL XL) extended release tablet 50 mg  50 mg Oral Daily Karly Branch MD   50 mg at 19

## 2019-07-01 NOTE — PLAN OF CARE
Problem: Falls - Risk of:  Goal: Will remain free from falls  Description  Will remain free from falls  Outcome: Ongoing  Goal: Absence of physical injury  Description  Absence of physical injury  Outcome: Ongoing     Problem: Risk for Impaired Skin Integrity  Goal: Tissue integrity - skin and mucous membranes  Description  Structural intactness and normal physiological function of skin and  mucous membranes.   Outcome: Ongoing     Problem: Confusion - Acute:  Goal: Absence of continued neurological deterioration signs and symptoms  Description  Absence of continued neurological deterioration signs and symptoms  Outcome: Ongoing  Goal: Mental status will be restored to baseline  Description  Mental status will be restored to baseline  Outcome: Ongoing     Problem: Discharge Planning:  Goal: Ability to perform activities of daily living will improve  Description  Ability to perform activities of daily living will improve  Outcome: Ongoing  Goal: Participates in care planning  Description  Participates in care planning  Outcome: Ongoing

## 2019-07-01 NOTE — PROGRESS NOTES
Hospitalist Progress Note  7/1/2019 1:15 PM  Subjective:   Admit Date: 6/29/2019  PCP: Claire Caceres MD  Interval History: pt is sleepy  No acute problems acc to staff  Chart reviewed. Diet: DIET GENERAL;  Medications:   Scheduled Meds:   magnesium sulfate  2 g Intravenous Once    magnesium sulfate  2 g Intravenous Once    metoprolol succinate  50 mg Oral Daily    nicotine  1 patch Transdermal Daily    pantoprazole  40 mg Oral QAM AC    sodium chloride flush  10 mL Intravenous 2 times per day    folic acid  1 mg Oral Daily    potassium chloride  20 mEq Oral Daily with breakfast    LORazepam  1 mg Intravenous Once    sodium chloride flush  10 mL Intravenous 2 times per day     Continuous Infusions:   dextrose 5 % and 0.45 % NaCl 100 mL/hr at 06/30/19 1451     CBC:   Recent Labs     06/29/19 1657 07/01/19  0453   WBC 6.2 2.7*   HGB 13.9 12.3*   PLT 63* 47*     BMP:    Recent Labs     06/29/19 1656 07/01/19  0453    133*   K 3.1* 2.6*   CL 93* 98*   CO2 26 22   BUN 15 5*   CREATININE 1.0 <0.5*   GLUCOSE 91 123*     Hepatic:   Recent Labs     06/29/19  1656   AST 62*   ALT 31   BILITOT 1.2*   ALKPHOS 138*     Troponin:   Recent Labs     06/29/19 1656   TROPONINI <0.01     BNP: No results for input(s): BNP in the last 72 hours. Lipids: No results for input(s): CHOL, HDL in the last 72 hours. Invalid input(s): LDLCALCU  INR: No results for input(s): INR in the last 72 hours. Objective:   Vitals: /65   Pulse 63   Temp 97.7 °F (36.5 °C) (Oral)   Resp 18   Ht 6' 2\" (1.88 m)   Wt 148 lb 2.4 oz (67.2 kg)   SpO2 94%   BMI 19.02 kg/m²   General appearance: sleepyHEENT: Head: Normal, normocephalic, atraumatic, anicteric, pupils are reactive to light. Dry mucous membrane.   Neck: no adenopathy, no carotid bruit, supple, symmetrical, trachea midline and thyroid not enlarged, symmetric, no tenderness/mass/nodules  Lungs: clear  Heart: regular rate and rhythm, S1, S2 normal, no murmur, click, rub or gallop  Abdomen: soft, non-tender; bowel sounds normal; no masses,  no organomegaly  Extremities: extremities normal, atraumatic, no cyanosis or edema  Neurologic: Mental status: sleepy    Assessment and Plan:   1. Alcohol abuse=sleepy  2. Electrolyte abnormalities-replace  3. htn    Patient Active Problem List:     Closed compression fracture of L1 lumbar vertebra     Pancytopenia (HCC)     Alcohol abuse     Tobacco abuse     COPD (chronic obstructive pulmonary disease) ([facility])     MRSA infection, UTI     Sepsis ([address]     Abnormal brain CT     Acute cystitis without hematuria     Closed fracture of right inferior pubic ramus ([facility])     Withdrawal symptoms, alcohol, with delirium ([address]    Pt is stable. Discussed with staff   See the orders for further plan. Will proceed further acc to pt's progress.   Time spent with management of the pt is-30 minutes      Electronically signed by: Espinoza Pagan MD, on 7/1/2019, at 1:15 PM

## 2019-07-01 NOTE — PLAN OF CARE
Problem: Falls - Risk of:  Goal: Will remain free from falls  Description  Will remain free from falls  7/1/2019 1644 by Maria G Nelson RN  Outcome: Ongoing  Note:   Pt has made no attempts to get out of bed, tele camera is in room due to pt's confusion. Bed alarm is in use as well. Problem: Falls - Risk of:  Goal: Absence of physical injury  Description  Absence of physical injury  7/1/2019 1644 by Maria G Nelson RN  Outcome: Ongoing  Note:   No signs of physical injury. Problem: Risk for Impaired Skin Integrity  Goal: Tissue integrity - skin and mucous membranes  Description  Structural intactness and normal physiological function of skin and  mucous membranes. 7/1/2019 1644 by Maria G Nelson RN  Outcome: Ongoing  Note:   Pt has moisture associated dermatitis to his perineal area. Zinc ointment applied. Nurse also placed condom cath on pt to help contain urine to protect skin. Nurse is also turning pt q2H. Problem: Confusion - Acute:  Goal: Absence of continued neurological deterioration signs and symptoms  Description  Absence of continued neurological deterioration signs and symptoms  7/1/2019 1644 by Maria G Nelson RN  Outcome: Ongoing  Note:   Pt remains confused, able to verbalize who he is and where he is. Problem: Confusion - Acute:  Goal: Mental status will be restored to baseline  Description  Mental status will be restored to baseline  7/1/2019 1644 by Maria G Nelson RN  Outcome: Ongoing  Note:   Pt is normally alert and oriented. He lives in a home with a room mate. He does use alcohol daily. Problem: Discharge Planning:  Goal: Ability to perform activities of daily living will improve  Description  Ability to perform activities of daily living will improve  7/1/2019 1644 by Maria G Nelson RN  Outcome: Ongoing  Note:   Pt is dependent on staff for personal care needs.      Problem: Discharge Planning:  Goal: Participates in care planning  Description  Participates in care

## 2019-07-01 NOTE — PLAN OF CARE
Problem: Falls - Risk of:  Goal: Will remain free from falls  Description  Will remain free from falls  7/1/2019 0405 by Rashid Zaidi RN  Outcome: Ongoing  7/1/2019 0404 by Rashid Zaidi RN  Outcome: Ongoing  Goal: Absence of physical injury  Description  Absence of physical injury  7/1/2019 0405 by Rashid Zaidi RN  Outcome: Ongoing  7/1/2019 0404 by Rashid Zaidi RN  Outcome: Ongoing     Problem: Falls - Risk of:  Goal: Absence of physical injury  Description  Absence of physical injury  7/1/2019 0405 by Rashid Zaidi RN  Outcome: Ongoing  7/1/2019 0404 by Rashid Zaidi RN  Outcome: Ongoing     Problem: Risk for Impaired Skin Integrity  Goal: Tissue integrity - skin and mucous membranes  Description  Structural intactness and normal physiological function of skin and  mucous membranes.   7/1/2019 0405 by Rashid Zaidi RN  Outcome: Ongoing  7/1/2019 0404 by Rashid Zaidi RN  Outcome: Ongoing     Problem: Confusion - Acute:  Goal: Absence of continued neurological deterioration signs and symptoms  Description  Absence of continued neurological deterioration signs and symptoms  7/1/2019 0405 by Rashid Zaidi RN  Outcome: Ongoing  7/1/2019 0404 by Rashid Zaidi RN  Outcome: Ongoing  Goal: Mental status will be restored to baseline  Description  Mental status will be restored to baseline  7/1/2019 0405 by Rashid Zaidi RN  Outcome: Ongoing  7/1/2019 0404 by Rashid Zaidi RN  Outcome: Ongoing     Problem: Confusion - Acute:  Goal: Mental status will be restored to baseline  Description  Mental status will be restored to baseline  7/1/2019 0405 by Rashid Zaidi RN  Outcome: Ongoing  7/1/2019 0404 by Rashid Zaidi RN  Outcome: Ongoing

## 2019-07-02 PROBLEM — R41.0 DELIRIUM: Status: ACTIVE | Noted: 2019-01-01

## 2019-07-02 NOTE — PROGRESS NOTES
Physical Therapy    Facility/Department: [facility] PROGRESSIVE CARE  Initial Assessment    NAME: Colt Tse  : 1950  MRN: [id number]    Date of Service: 2019    Discharge Recommendations:  Patient would benefit from continued therapy after discharge, 3-5 sessions per week   PT Equipment Recommendations  Other: defer to next level of care  Colt Tse scored a 17/24 on the AM-PAC short mobility form. Current research shows that an AM-PAC score of 17 or less is typically not associated with a discharge to the patient's home setting. Based on the patients AM-PAC score and their current functional mobility deficits, it is recommended that the patient have 3-5 sessions per week of Physical Therapy at d/c to increase the patients independence. Assessment   Body structures, Functions, Activity limitations: Decreased functional mobility   Assessment: pt is a 78 yo male who was adm to hosp with AMS/confusion and found down in his home covered in stool; pt is a high fall risk and not safe to return home at discharge due to decreased balance and not being able to care for himself; pt reports he is home alone during the day and has decreased support for assistance; feel pt will benefit from continued therapy at discharge  Prognosis: Fair  Decision Making: Medium Complexity  Patient Education: role and purpose of PT  Barriers to Learning: decreased eyesight; confusion  REQUIRES PT FOLLOW UP: Yes  Activity Tolerance  Activity Tolerance: Patient limited by endurance       Patient Diagnosis(es): The primary encounter diagnosis was Altered mental status, unspecified altered mental status type. Diagnoses of Acute hyperactive alcohol withdrawal delirium ([address] Hypokalemia, Hypomagnesemia, Candidal dermatitis, and Alcohol abuse were also pertinent to this visit.      has a past medical history of Alcohol abuse, COPD (chronic obstructive pulmonary disease) ([address] and Tobacco abuse.   has no past surgical Appears intact  Memory: Decreased recall of recent events  Safety Judgement: Decreased awareness of need for safety;Decreased awareness of need for assistance  Insights: Decreased awareness of deficits    Objective          AROM RLE (degrees)  RLE AROM: WFL  AROM LLE (degrees)  LLE AROM : WFL  Strength RLE  Strength RLE: WFL  Strength LLE  Strength LLE: WFL     Sensation  Overall Sensation Status: (pt denies numbness/tingling)  Bed mobility  Supine to Sit: Unable to assess  Sit to Supine: Unable to assess  Transfers  Sit to Stand: Contact guard assistance  Stand to sit: Contact guard assistance  Ambulation  Ambulation?: Yes  Ambulation 1  Surface: level tile  Device: Rolling Walker  Assistance: Contact guard assistance  Quality of Gait: unsteady especialy with turns; at times needs cues for walker safety  Distance: amb short distances in hallway with several standing rest breaks; pt impulsive at times and needs cues for safety     Balance  Comments: SBA for sitting balance CGA for standing with walker        Plan   Plan  Times per week: 3-5  Current Treatment Recommendations: Functional Mobility Training  Safety Devices  Type of devices: Call light within reach, Chair alarm in place, Left in chair, Nurse notified      AM-PAC Score  -PAC Inpatient Mobility Raw Score : 17 (07/02/19 1546)  AM-PAC Inpatient T-Scale Score : 42.13 (07/02/19 1546)  Mobility Inpatient CMS 0-100% Score: 50.57 (07/02/19 1546)  Mobility Inpatient CMS G-Code Modifier : CK (07/02/19 1546)          Goals  Short term goals  Time Frame for Short term goals: by discharge  Short term goal 1: bed mob MI  Short term goal 2: transfers MI  Short term goal 3: amb 100-200 feet with RW SBA/sup  Short term goal 4: negotiate stairs with SBA  Patient Goals   Patient goals : pt does not want to fall       Therapy Time   Individual Concurrent Group Co-treatment   Time In 1450         Time Out 1545         Minutes 55              If patient is discharged prior to the next physical therapy visit;  Please see last written PT note for discharge status.     Gem Renee, PT, [id number]   GEM RENEE, PT

## 2019-07-02 NOTE — DISCHARGE INSTR - COC
 Delirium R41.0       Isolation/Infection:   Isolation          No Isolation        Patient Infection Status     Infection Encounter Level? Onset Date Added Added By Resolved Resolved By Review Date    MRSA No  06/22/18 Samantha Canseco RN 07/01/19 Samantha Canseco RN     Urine 6/20/2018          Nurse Assessment:  Last Vital Signs: /78   Pulse 81   Temp 98.8 °F (37.1 °C) (Oral)   Resp 18   Ht 6' 2\" (1.88 m)   Wt 160 lb 0.9 oz (72.6 kg)   SpO2 96%   BMI 20.55 kg/m²     Last documented pain score (0-10 scale): Pain Level: 0  Last Weight:   Wt Readings from Last 1 Encounters:   07/02/19 160 lb 0.9 oz (72.6 kg)     Mental Status:  alert and able to concentrate and follow conversation    IV Access:  - None    Nursing Mobility/ADLs:  Walking   Assisted - walker  Transfer  Independent  Bathing  Assisted  Dressing  Assisted  Toileting  Assisted  Feeding  [address]  Assisted  Med Delivery   whole    Wound Care Documentation and Therapy:        Elimination:  Continence:   · Bowel: Yes  · Bladder: No  Urinary Catheter: None   Colostomy/Ileostomy/Ileal Conduit: No       Date of Last BM: 7/3/2019    Intake/Output Summary (Last 24 hours) at 7/2/2019 1322  Last data filed at 7/2/2019 1124  Gross per 24 hour   Intake 4167 ml   Output 2600 ml   Net 1567 ml     I/O last 3 completed shifts: In: 3414 [P.O.:780; I.V.:2268; IV Piggyback:550]  Out: 800 [Urine:800]    Safety Concerns: At Risk for Falls    Impairments/Disabilities:      Vision and Hearing    Nutrition Therapy:  Current Nutrition Therapy:   - Oral Diet:  General    Routes of Feeding: Oral  Liquids: Thin Liquids  Daily Fluid Restriction: no  Last Modified Barium Swallow with Video (Video Swallowing Test): not done    Treatments at the Time of Hospital Discharge:   Respiratory Treatments: See Below  Oxygen Therapy:  is not on home oxygen therapy.   Ventilator:    - No ventilator support    Rehab Therapies: {THERAPEUTIC INTERVENTION:[id number]}  Weight Bearing Status/Restrictions: No weight bearing restirctions  Other Medical Equipment (for information only, NOT a DME order):  walker and bath bench  Other Treatments:     Patient's personal belongings (please select all that are sent with patient):  None    RN SIGNATURE:  {Esignature:[id number]}    CASE MANAGEMENT/SOCIAL WORK SECTION    Inpatient Status Date: 7/2/19    Readmission Risk Assessment Score:  Readmission Risk              Risk of Unplanned Readmission:        18           Discharging to Facility/ Agency   · Name: [facility]  · Address:  · Phone: [phone]  · Fax: [phone]    Dialysis Facility (if applicable)   · Name:  · Address:  · Dialysis Schedule:  · Phone:  · Fax:    / signature:  Electronically signed by JOEL Lane, DANNYW on 7/3/19 at 2:02 PM      PHYSICIAN SECTION    Prognosis: Guarded    Condition at Discharge: Stable    Rehab Potential (if transferring to Rehab): Guarded    Recommended Labs or Other Treatments After Discharge: cbc and cmp 1 wk  PT  OT    Physician Certification: I certify the above information and transfer of Aniceto Underwood  is necessary for the continuing treatment of the diagnosis listed and that he requires skilled care for greater 30 days.      Update Admission H&P: No change in H&P    PHYSICIAN SIGNATURE:  Electronically signed by Gatito Myrick MD on 7/2/19 at 1:22 PM

## 2019-07-02 NOTE — PROGRESS NOTES
but no overt LOB noted  ADL  Grooming: Stand by assistance(washing face/hands in stance at sink)  LE Dressing: Contact guard assistance(supervision to doff/don socks in recliner, CGA to pull pants over hips)  Additional Comments: Anticipate pt to require setup for feeding, SBA for seated UB bathing/dressing, CGA/min A for LB bathing/dressing, CGA for toileting based on ROM, strength, endurance this session        Bed mobility  Supine to Sit: Unable to assess  Sit to Supine: Unable to assess  Comment: pt up in recliner at start and end of session  Transfers  Stand Step Transfers: Contact guard assistance  Sit to stand: Contact guard assistance  Stand to sit: Contact guard assistance  Transfer Comments: with RW. min/mod vc's for safety awareness     Cognition  Overall Cognitive Status: Exceptions  Arousal/Alertness: Appropriate responses to stimuli  Following Commands:  Follows one step commands consistently  Attention Span: Appears intact  Memory: Decreased recall of recent events  Safety Judgement: Decreased awareness of need for safety;Decreased awareness of need for assistance  Insights: Decreased awareness of deficits        Sensation  Overall Sensation Status: (pt denies numbness/tingling)        LUE AROM (degrees)  LUE AROM : WFL  Left Hand AROM (degrees)  Left Hand AROM: WFL  RUE AROM (degrees)  RUE AROM : WFL  Right Hand AROM (degrees)  Right Hand AROM: WFL  LUE Strength  Gross LUE Strength: WFL  RUE Strength  Gross RUE Strength: WFL                   Plan   Plan  Times per week: 3-5  Times per day: Daily  Current Treatment Recommendations: Strengthening, Functional Mobility Training, Endurance Training, Balance Training, Self-Care / ADL, Safety Education & Training, Cognitive Reorientation      AM-PAC Score        -[facility] Inpatient Daily Activity Raw Score: 17 (07/02/19 1545)  AM-PAC Inpatient ADL T-Scale Score : 37.26 (07/02/19 1545)  ADL Inpatient CMS 0-100% Score: 50.11 (07/02/19 1545)  ADL Inpatient CMS

## 2019-07-03 NOTE — PLAN OF CARE
Problem: Falls - Risk of:  Goal: Will remain free from falls  Description  Will remain free from falls  Outcome: Ongoing   Fall risk assessment completed per unit protocol. Patient's bed is in the lowest position, call light is within reach and the patient's room is free of clutter. The patient has been instructed to call for assistance before getting out of bed or the chair. Problem: Risk for Impaired Skin Integrity  Goal: Tissue integrity - skin and mucous membranes  Description  Structural intactness and normal physiological function of skin and  mucous membranes. 7/3/2019 0951 by Cheng Rose RN  Outcome: Ongoing   Patient's skin has been assessed per unit protocol and the patient is being repositioned or encouraged to turn every two hours to prevent skin breakdown and promote healing. Problem: Confusion - Acute:  Goal: Absence of continued neurological deterioration signs and symptoms  Description  Absence of continued neurological deterioration signs and symptoms  Outcome: Ongoing   Patient is alert and oriented to self, place and situation but not time. Patient has a poor sense of safety and is impulsive when it comes to getting up.     Problem: Mood - Altered:  Goal: Mood stable  Description  Mood stable  7/3/2019 0951 by Cheng Rose RN  Outcome: Ongoing   Patient is calm resting in the chair     Problem: Sleep Pattern Disturbance:  Goal: Appears well-rested  Description  Appears well-rested  7/3/2019 0951 by Cheng Rose RN  Outcome: Ongoing   Patient was well rested overnight

## 2019-07-03 NOTE — PLAN OF CARE
Problem: Falls - Risk of:  Goal: Absence of physical injury  Description  Absence of physical injury  Outcome: Ongoing  Note:   Fall risk precautions in place. Bed in lowest position with wheels locked,bed alarm in place and activated,non-skid socks on pt, fall risk ID on pt, call light in reach, will continue to monitor. Problem: Risk for Impaired Skin Integrity  Goal: Tissue integrity - skin and mucous membranes  Description  Structural intactness and normal physiological function of skin and  mucous membranes. Outcome: Ongoing     Problem: Confusion - Acute:  Goal: Mental status will be restored to baseline  Description  Mental status will be restored to baseline  Outcome: Ongoing     Problem: Injury - Risk of, Physical Injury:  Goal: Absence of physical injury  Description  Absence of physical injury  Outcome: Ongoing  Note:   Fall risk precautions in place. Bed in lowest position with wheels locked,bed alarm in place and activated,non-skid socks on pt, fall risk ID on pt, call light in reach, will continue to monitor.        Problem: Mood - Altered:  Goal: Mood stable  Description  Mood stable  Outcome: Ongoing     Problem: Mood - Altered:  Goal: Absence of abusive behavior  Description  Absence of abusive behavior  Outcome: Ongoing     Problem: Mood - Altered:  Goal: Verbalizations of feeling emotionally comfortable while being cared for will increase  Description  Verbalizations of feeling emotionally comfortable while being cared for will increase  Outcome: Ongoing     Problem: Psychomotor Activity - Altered:  Goal: Absence of psychomotor disturbance signs and symptoms  Description  Absence of psychomotor disturbance signs and symptoms  Outcome: Ongoing     Problem: Sleep Pattern Disturbance:  Goal: Appears well-rested  Description  Appears well-rested  Outcome: Ongoing

## 2019-07-03 NOTE — PROGRESS NOTES
Hospitalist Progress Note  7/2/2019 10:11 PM  Subjective:   Admit Date: 6/29/2019  PCP: Josh Reyes MD  Interval History: pt is alert and awake  Wants to drink a soon as he gets home  Eating well  Wants to go home  No acute problems acc to staff  Chart reviewed. Diet: DIET GENERAL;  Medications:   Scheduled Meds:   metoprolol succinate  50 mg Oral Daily    nicotine  1 patch Transdermal Daily    pantoprazole  40 mg Oral QAM AC    sodium chloride flush  10 mL Intravenous 2 times per day    folic acid  1 mg Oral Daily    potassium chloride  20 mEq Oral Daily with breakfast    LORazepam  1 mg Intravenous Once    sodium chloride flush  10 mL Intravenous 2 times per day     Continuous Infusions:   dextrose 5 % and 0.45 % NaCl 100 mL/hr at 07/02/19 1118     CBC:   Recent Labs     07/01/19  0453   WBC 2.7*   HGB 12.3*   PLT 47*     BMP:    Recent Labs     07/01/19  0453 07/01/19  1815 07/02/19  0617   *  --  136   K 2.6* 3.5 3.4*   CL 98*  --  102   CO2 22  --  23   BUN 5*  --  5*   CREATININE <0.5*  --  <0.5*   GLUCOSE 123*  --  121*     Hepatic:   No results for input(s): AST, ALT, ALB, BILITOT, ALKPHOS in the last 72 hours. Troponin:   No results for input(s): TROPONINI in the last 72 hours. BNP: No results for input(s): BNP in the last 72 hours. Lipids: No results for input(s): CHOL, HDL in the last 72 hours. Invalid input(s): LDLCALCU  INR: No results for input(s): INR in the last 72 hours. Objective:   Vitals: /80   Pulse 69   Temp 98.1 °F (36.7 °C) (Oral)   Resp 17   Ht 6' 2\" (1.88 m)   Wt 160 lb 0.9 oz (72.6 kg)   SpO2 96%   BMI 20.55 kg/m²   General appearance: alert and awake  HEENT: Head: Normal, normocephalic, atraumatic, anicteric, pupils are reactive to light. Dry mucous membrane.   Neck: no adenopathy, no carotid bruit, supple, symmetrical, trachea midline and thyroid not enlarged, symmetric, no tenderness/mass/nodules  Lungs: clear  Heart: regular rate and rhythm,

## 2019-07-03 NOTE — CARE COORDINATION
[facility] and [address] [place] [address] are both able to accept him. Sw spoke to patient, he originally stated that he wanted to return home. After further discussion, he agreed that SNF would be needed. He has contradicted himself stating that he can take care of himself and then would state that he needs assist at home and no one to take care of him(his roommate is in hospital). His friend Morris  would prefer [facility]. He is in agreement with SNF as Morris  feels that he will need it. He would prefer [facility]. Fransisco notified Karla Knapp at [facility]. Fransisco faxed [employer] pre-cert request to [facility] Kwan again as this SW was informed that they did not get request this AM when faxed. Fransisco emailed Maddy Mello at [facility] as [facility]. Completed HENS.      Nahun Cordero, [address] [facility]  [phone]  7/3/19 at 2:07 PM

## 2019-07-04 NOTE — PLAN OF CARE
Problem: Falls - Risk of:  Goal: Will remain free from falls  Description  Will remain free from falls  Outcome: Ongoing     Problem: Falls - Risk of:  Goal: Absence of physical injury  Description  Absence of physical injury  Outcome: Ongoing     Problem: Risk for Impaired Skin Integrity  Goal: Tissue integrity - skin and mucous membranes  Description  Structural intactness and normal physiological function of skin and  mucous membranes.   Outcome: Ongoing     Problem: Confusion - Acute:  Goal: Absence of continued neurological deterioration signs and symptoms  Description  Absence of continued neurological deterioration signs and symptoms  Outcome: Ongoing     Problem: Confusion - Acute:  Goal: Mental status will be restored to baseline  Description  Mental status will be restored to baseline  Outcome: Ongoing     Problem: Discharge Planning:  Goal: Ability to perform activities of daily living will improve  Description  Ability to perform activities of daily living will improve  Outcome: Ongoing     Problem: Confusion - Acute:  Goal: Mental status will be restored to baseline  Description  Mental status will be restored to baseline  Outcome: Ongoing     Problem: Discharge Planning:  Goal: Ability to perform activities of daily living will improve  Description  Ability to perform activities of daily living will improve  Outcome: Ongoing     Problem: Discharge Planning:  Goal: Participates in care planning  Description  Participates in care planning  Outcome: Ongoing     Problem: Injury - Risk of, Physical Injury:  Goal: Will remain free from falls  Description  Will remain free from falls  Outcome: Ongoing     Problem: Injury - Risk of, Physical Injury:  Goal: Absence of physical injury  Description  Absence of physical injury  Outcome: Ongoing     Problem: Injury - Risk of, Physical Injury:  Goal: Absence of physical injury  Description  Absence of physical injury  Outcome: Ongoing     Problem: Mood -

## 2019-07-04 NOTE — PROGRESS NOTES
notified; Chair alarm in place         Patient Diagnosis(es): The primary encounter diagnosis was Altered mental status, unspecified altered mental status type. Diagnoses of Acute hyperactive alcohol withdrawal delirium ([address] Hypokalemia, Hypomagnesemia, Candidal dermatitis, and Alcohol abuse were also pertinent to this visit. has a past medical history of Alcohol abuse, COPD (chronic obstructive pulmonary disease) ([address] and Tobacco abuse.   has no past surgical history on file. Restrictions  Restrictions/Precautions  Restrictions/Precautions: Fall Risk  Position Activity Restriction  Other position/activity restrictions: CIWA precautions  Subjective   General  Chart Reviewed: Yes  Patient assessed for rehabilitation services?: Yes  Additional Pertinent Hx: Per H&P: \"This 70-year-old male patient with history of alcohol abuse came to the emergency room because of some confusion. The pt likes vodka and tequila. The pt also had a fall and complaining of some hip and neck pain. The pt had a x-ray and does not show any fx. The pt was admitted for treatment of alcohol withdrawal\"  Family / Caregiver Present: No  Referring Practitioner: Claire Caceres MD  Diagnosis: etoh abuse  Subjective  Subjective: Pt supine in bed and agreeable to OT services. Pt reports no pain prior to session. General Comment  Comments: okay for therapy per RN.        Orientation  Orientation  Overall Orientation Status: Impaired  Orientation Level: Oriented to person;Oriented to place  Objective    ADL  Grooming: Stand by assistance(in stance at sink to wash hands)  LE Dressing: Contact guard assistance;Stand by assistance(CGA/SBA in stance to pull over hips; pt able to thread B LE into briefs seated with SBA and use of grab bars for balance)  Toileting: Contact guard assistance;Stand by assistance(CGA/SBA in stance to manage briefs; pericare completed in sitting)        Balance  Sitting Balance: Stand by assistance  Standing

## 2019-07-05 NOTE — PROGRESS NOTES
1. Caller Name: Mother                      Call Back Number: [phone] (home)     2. Message: Mother called and states June might have a sinus infection. Mother states she has a lot of mucus with blood. Coming out of her nose and a wet cough. No fevers. She was wondering if you can see her today.    3. Patient approves office to leave a detailed voicemail/MyChart message: yes       Physical Therapy    Facility/Department: [facility] 5 PROGRESSIVE CARE  Daily Treatment Note    This note serves as patient discharge summary if pt discharges prior to next PT visit      NAME: Fabricio Mix  : 1950  MRN: [id number]    Date of Service: 2019    Discharge Recommendations:  Patient would benefit from continued therapy after discharge, 3-5 sessions per week   Fabricio Mix scored a 21/24 on the AM-PAC short mobility form. Current research shows that an AM-PAC score of 17 or less is typically not associated with a discharge to the patient's home setting. Based on the patients AM-PAC score and their current functional mobility deficits, it is recommended that the patient have 3-5 sessions per week of Physical Therapy at d/c to increase the patients independence. PLEASE NOTE ASSESSMENT WITH DC RECOMMENDATIONS  PT Equipment Recommendations  Other: defer to next level of care    Assessment   Body structures, Functions, Activity limitations: Decreased functional mobility   Assessment: pt is a 78 yo male who was adm to hosp with AMS/confusion and found down in his home covered in stool. Patient is a high fall risk. He displays decreased safety awaerness. He requires CGA for stairs, and verbal cues and SBA for safe use of wh walker during gait, despite ambulating functional distances. Patient would benefit from ongoing skilled therapy to progress safety, independence, and tolerance of functional mobility. Long term, patient would be best served in a supervised living situation. Treatment Diagnosis: Impaired functional mobility  Prognosis: Fair  Decision Making: Medium Complexity  History: see top of note  Patient Education: role and purpose of PT, Safe use of wh walker. Rationale for sitting in BS chair. Rationale for use of chair alarm. Barriers to Learning: decreased eyesight, decreased hearing.   REQUIRES PT FOLLOW UP: Yes  Activity Tolerance  Activity Tolerance: Patient limited by endurance       Patient Diagnosis(es): The primary encounter diagnosis was Altered mental status, unspecified altered mental status type. Diagnoses of Acute hyperactive alcohol withdrawal delirium ([address] Hypokalemia, Hypomagnesemia, Candidal dermatitis, and Alcohol abuse were also pertinent to this visit. has a past medical history of Alcohol abuse, COPD (chronic obstructive pulmonary disease) ([address] and Tobacco abuse.   has no past surgical history on file. Restrictions  Restrictions/Precautions  Restrictions/Precautions: Fall Risk  Position Activity Restriction  Other position/activity restrictions: CIWA precautions  Vision/Hearing        Subjective  General  Chart Reviewed: Yes  Additional Pertinent Hx: Patient is a 78 yo male who was adm to [facility] with confusion; he had been discharged recently (approx 1 month ago) after a fall at home sustaining a pelvic fx  Response To Previous Treatment: Patient with no complaints from previous session. Family / Caregiver Present: No  Referring Practitioner: Dr. Murali Holliday: Patient pleasant and agreeable to therapy; denies pain. Pain Screening  Patient Currently in Pain: Denies     Orientation  Orientation  Overall Orientation Status: Impaired  Orientation Level: Oriented to place;Oriented to person;Disoriented to time;Disoriented to situation(Year \"2015\")  Social/Functional History  Social/Functional History  Lives With: Friend(s)(\"ladarius Alfonso\")  Type of Home: Apartment  Home Access: Stairs to enter with rails  Entrance Stairs - Number of Steps: 3-4 with rails on B sides + 7 with rail on L side  Bathroom Shower/Tub: Tub/Shower unit  Bathroom Toilet: Standard(tub and vanity on either side of toilet)  Bathroom Equipment: Grab bars in shower, Shower chair  Home Equipment: Rolling walker, Cane, Alert Button  ADL Assistance: Independent  Homemaking Assistance: (Jacquelinevickey Foote does the cooking, laundry.  Has  1x/wk)  Ambulation Assistance: G-Code Modifier : Simon Show (07/05/19 6875)     Goals  Short term goals  Time Frame for Short term goals: by discharge  Short term goal 1: bed mob MI  Short term goal 2: transfers MI  Short term goal 3: amb 100-200 feet with RW SBA/sup.  7-5-19: goal met.   Short term goal 4: negotiate stairs with SBA  Patient Goals   Patient goals : pt does not want to fall    Therapy Time   Individual Concurrent Group Co-treatment   Time In 0850         Time Out 0930         Minutes Hector Rao, PT  Electronically signed by Nirmala Hendrickson, PT [id number] on 7/5/2019 at 9:47 AM

## 2019-07-05 NOTE — CARE COORDINATION
DISCHARGE SUMMARY     DATE OF DISCHARGE: 7/5/19    DISCHARGE DESTINATION: [address]    Level of Care: SKilled    Report Number: [id number]    Hens completed    TRANSPORTATION: [facility] Name:  [address] [place] [address] up Time: 5:30pm    Phone Number: [phone]    COMMENTS: Per Genaro Fitzpatrick at [facility] [place], patient has been approved and facility has been notified. Auth number is [id number]. Sw left message with Gaby Melendezch at [facility] to confirm. Will obtain confirmation before sending patient. Larry Yanez, [address] [place] [address]  [phone]  7/5/19 at 3:48 PM    Radha at [facility] confirmed that they can accept patient.      Larry Yanez, [address] [place] [address]  [phone]  7/5/19 at 4:04 PM

## 2019-07-05 NOTE — PROGRESS NOTES
Discharge papers prepared and placed in packet prepared for Lashae. Report called to RN at [facility]. RN assisted pt to stretcher. Pt left with bag of personal belongings. Left on stretcher with [facility] to facility. IV removed. Adequate for discharge.

## 2019-07-05 NOTE — PROGRESS NOTES
Yes  Patient assessed for rehabilitation services?: Yes  Additional Pertinent Hx: Per H&P: \"This 41-year-old male patient with history of alcohol abuse came to the emergency room because of some confusion. The pt likes vodka and tequila. The pt also had a fall and complaining of some hip and neck pain. The pt had a x-ray and does not show any fx.  The pt was admitted for treatment of alcohol withdrawal\"  Response to previous treatment: Patient with no complaints from previous session  Family / Caregiver Present: No  Referring Practitioner: Claire Caceres MD  Diagnosis: etoh abuse  Subjective: Patient met b/s, lying supine, agreeable to OT treat, does not report or indicate any pain      Orientation  Overall Orientation Status: (decreased recall of recent events, not completely oriented to situation )    Objective    ADL  Grooming: Setup(bath wipes for handwashing )  LE Bathing: Setup(while supine in bed, patient washes vasiliy-area following incontinence urine in depends/pants )  LE Dressing: Setup(doff/don pants and depends while in bed with setup supplies )  Toileting: (incontinent urine through depends/pants with patient completing depends/pants change and vasiliy-care with setup once supine in bed )  Additional Comments: patient declines full bath seated at sink     Balance  Sitting Balance: Supervision  Standing Balance: Stand by assistance    Transfers  Sit to stand: Stand by assistance  Stand to sit: Stand by assistance    Functional Mobility  Functional - Mobility Device: Rolling Walker  Activity: Other  Assist Level: Contact guard assistance  Functional Mobility Comments: patient completes fxl mobility throughout room and hallway > 400 feet with RW with CGA, cues to stay inside of walker as pushes walker too far ahead, patient reports feels good to complete mobility    Cognition  Overall Cognitive Status: Exceptions  Memory: Decreased recall of recent events;Decreased short term memory;Decreased long term

## 2019-07-11 NOTE — DISCHARGE SUMMARY
Metabolic Panel w/ Reflex to MG     Standing Status:   Standing     Number of Occurrences:   1    Troponin     Standing Status:   Standing     Number of Occurrences:   1    Lipase     Standing Status:   Standing     Number of Occurrences:   1    Urinalysis Reflex to Culture     Standing Status:   Standing     Number of Occurrences:   1    Blood Gas, Arterial     Standing Status:   Standing     Number of Occurrences:   1    Ammonia     Standing Status:   Standing     Number of Occurrences:   1    Lactate, Sepsis     Standing Status:   Standing     Number of Occurrences:   2    Magnesium     Standing Status:   Standing     Number of Occurrences:   1    Lipase     Standing Status:   Standing     Number of Occurrences:   1    Amylase     Standing Status:   Standing     Number of Occurrences:   1    Basic Metabolic Panel w/ Reflex to MG     Standing Status:   Standing     Number of Occurrences:   1    CBC     Standing Status:   Standing     Number of Occurrences:   1    Phosphorus     Standing Status:   Standing     Number of Occurrences:   1    Magnesium     Standing Status:   Standing     Number of Occurrences:   1    Potassium     Standing Status:   Standing     Number of Occurrences:   1    Ammonia     Standing Status:   Standing     Number of Occurrences:   1    Catheter removal     Standing Status:   Standing     Number of Occurrences:   1    Inpatient consult to Primary Care Provider     Dr. Alexia Whitten     Standing Status:   Standing     Number of Occurrences:   1     Order Specific Question:   Reason for Consult? Answer:   admission     Order Specific Question:   Provider Contacted? Answer:   No    Inpatient consult to Social Work     Standing Status:   Standing     Number of Occurrences:   1     Order Specific Question:   Reason for Consult?      Answer:   Julia Escoto Inpatient consult to Home Care Needs     Physician to Follow Referral: Fartun Prather MD    I certify that I, or a nurse Estimated Length of Stay     Answer:   Estimated stay of more than 2 midnights     Order Specific Question:   Future Attending Provider     Answer:   Tamiko Barba [[id number]]    Discharge patient     Standing Status:   Standing     Number of Occurrences:   1     Order Specific Question:   Discharge Disposition     Answer:   Home    Discharge patient     Standing Status:   Standing     Number of Occurrences:   1     Order Specific Question:   Discharge Disposition     Answer:   Home    Discharge patient     Standing Status:   Standing     Number of Occurrences:   1     Order Specific Question:   Discharge Disposition     Answer:   Abel Donovan       No current facility-administered medications for this encounter. Current Outpatient Medications   Medication Sig Dispense Refill    folic acid (FOLVITE) 1 MG tablet Take 1 tablet by mouth daily 30 tablet 3    potassium chloride (KLOR-CON M) 20 MEQ extended release tablet Take 1 tablet by mouth daily 4 tablet 0    metoprolol succinate (TOPROL XL) 50 MG extended release tablet Take 1 tablet by mouth daily 30 tablet 3    Multiple Vitamins-Minerals (THERAPEUTIC MULTIVITAMIN-MINERALS) tablet Take 1 tablet by mouth daily 30 tablet 11    omeprazole (PRILOSEC) 20 MG delayed release capsule Take 1 capsule by mouth daily 30 capsule 3    vitamin B-1 (THIAMINE) 100 MG tablet Take 1 tablet by mouth daily 30 tablet 3    albuterol sulfate  (90 Base) MCG/ACT inhaler Inhale 2 puffs into the lungs 4 times daily as needed for Wheezing 1 Inhaler 5    nicotine (NICODERM CQ) 21 MG/24HR Place 1 patch onto the skin daily 30 patch 3    aspirin EC 81 MG EC tablet Take 1 tablet by mouth 2 times daily For 30 days post fractures for DVT blood clot prophylaxis  Please avoid missing doses.  60 tablet 0    acetaminophen (TYLENOL) 500 MG tablet Take 1,000 mg by mouth 2 times daily as needed for Pain         Orders Placed This Encounter   Medications    sodium

## 2019-07-30 NOTE — CARE COORDINATION
[facility] Transitions Initial Follow Up Call    Call within 2 business days of discharge: Yes    Patient: Gabbi Hunter Patient : 1950   MRN: <[id number]>  Reason for Admission: AMS  Discharge Date: 19 RARS: Readmission Risk Score: 14      Last Discharge [facility]       Complaint Diagnosis Description Type Department Provider    19 Altered Mental Status Altered mental status, unspecified altered mental status type . .. ED to Hosp-Admission (Discharged) (ADMITTED) [facility] 5W Dina Fischer MD; Kevyn Mena. .. Spoke with: Amada Grimaldo 91: [address] [facility]    Non-face-to-face services provided:  Obtained and reviewed discharge summary and/or continuity of care documents    Care Transitions 24 Hour Call    Do you have any ongoing symptoms?:  No  Do you have a copy of your discharge instructions?:  Yes  Do you have all of your prescriptions and are they filled?:  Yes  Have you been contacted by a [address]?:  No  Have you scheduled your follow up appointment?:  Yes (Comment: Patient seen by 19)  Care Transitions Interventions     Patient [place] stated he is doing very well. Patient seen by PCP. Medications reconciled. Per note [facility] Per staff at [facility] patient did discharge with elderly services x3 per week. Patient took contact information and stated he will have someone call to answer questions. Will follow up pending a return call.  REYNALDO Rossi RN  Care Transition Nurse [phone]        Follow Up  Future Appointments   Date Time Provider Tomeka Lopez   10/23/2019 11:00 AM MD Jase Bullock, RN

## 2019-12-21 NOTE — ED PROVIDER NOTES
I independently evaluated and obtained a history and physical on Michail Kawasaki. All diagnostic, treatment, and disposition assistants were made to myself in conjunction the advanced practice provider. For further details of this patient's emergency department encounter, please see the advanced practice provider's documentation. History: 43-year-old male presents with EMS for altered mental status. Last seen well yesterday evening, patient found this morning altered not able to answer questions appropriately. Patient arrives alert and does answer some questions appropriately. Follows commands. Does have previous history of alcoholism. Glucose on arrival 160. Patient denies any pain, head trauma or vision complaints. Physician Exam: 43-year-old disheveled  male lying in bed, normocephalic, atraumatic, extraocular movements intact, PERRLA, moist mucous membranes, no midline tenderness of the cervical, thoracic and lumbar spine, tachycardic, regular rhythm, lungs with crackles in the bases bilaterally, no increased work of breathing, cranial nerves II through XII intact, normal gaze, 5/5 strength throughout all extremities, normal sensation throughout, no facial droop, aphasia present, slight dysarthria. NIH Stroke Scale  NIH Stroke Scale Assessed: Yes  Interval: Baseline  Level of Consciousness (1a. ): Alert  LOC Questions (1b. ):  Answers one correctly  LOC Commands (1c. ): Performs one task correctly  Best Gaze (2. ): Normal  Visual (3. ): No visual loss  Facial Palsy (4. ): Normal symmetrical movement  Motor Arm, Left (5a. ): No drift  Motor Arm, Right (5b. ): No drift  Motor Leg, Left (6a. ): No drift  Motor Leg, Right (6b. ): No drift  Limb Ataxia (7. ): (!) Present in two limbs  Sensory (8. ): Normal  Best Language (9. ): Mild to moderate aphasia  Dysarthria (10. ): Mild to moderate dysarthria  Extinction and Inattention (11): No abnormality  Total: 6    The Ekg interpreted by me shows  Normal sinus rhythm with rate of 96, left axis deviation, , QRS 98, QTc 485, no ST elevations, no acute change compared EKG from 6/29/2019    4:46 PM  Awaiting result of MRI brain for wake-up stroke protocol. Will discuss patient's TPA treatment with stroke team after MRI results. 5:12 PM  Patient's MRI brain unremarkable. Care plan discussed with Dr. Bonita Rock, stroke team.  No indication for TPA at this time secondary to normal MRI. Also patient's only symptoms are some expressive aphasia. Plan for admission for further neurology evaluation.     Eric Ravi MD   [facility]  12/21/19  5:13 PM           Eric Ravi MD  12/21/19 0607

## 2019-12-21 NOTE — ED PROVIDER NOTES
measuring less than 50%. 5. No evidence of a major branch vessel occlusion on the CTA of the head. 6. Probable subacute/chronic fracture involving the lateral right 3rd rib. 7. Suspected chronic T5 superior endplate fracture. 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 Kain Sutton to [address] [place] [address] on   12/21/2019 at 16:13.                LABS:  Labs Reviewed   CBC WITH AUTO DIFFERENTIAL - Abnormal; Notable for the following components:       Result Value    WBC 3.5 (*)     RBC 3.97 (*)     .5 (*)     MCH 34.6 (*)     Platelets 78 (*)     Lymphocytes Absolute 0.8 (*)     All other components within normal limits    Narrative:     Performed at:  [address] [employer]   Phone [phone]   BASIC METABOLIC PANEL - Abnormal; Notable for the following components:    Chloride 95 (*)     Anion Gap 18 (*)     Glucose 111 (*)     BUN 6 (*)     CREATININE <0.5 (*)     All other components within normal limits    Narrative:     Performed at:  [address] [employer]   Phone [phone]   URINE RT REFLEX TO CULTURE - Abnormal; Notable for the following components:    Ketones, Urine TRACE (*)     pH, UA 8.5 (*)     All other components within normal limits    Narrative:     Performed at:  [address] [facility] [employer]   Phone [phone]   MAGNESIUM - Abnormal; Notable for the following components:    Magnesium 1.20 (*)     All other components within normal limits    Narrative:     Performed at:  [address] [facility] [employer]   Phone [phone]   HEPATIC FUNCTION PANEL - Abnormal; Notable for the Alkaline Phosphatase 146 (*)     AST 51 (*)     All other components within normal limits    Narrative:     Performed at:  [facility]  [address] [facility] [address] [address]   Phone [phone]   MAGNESIUM - Abnormal; Notable for the following components:    Magnesium 1.60 (*)     All other components within normal limits    Narrative:     Performed at:  [facility]  [address] [facility] [place] [address]   Phone [phone]   URINE DRUG SCREEN    Narrative:     Performed at:  [facility]  [address] [facility] [place] [address]   Phone [phone]   ETHANOL    Narrative:     Performed at:  [facility]  [address] [facility] [place] [address]   Phone [phone]   PHOSPHORUS    Narrative:     Performed at:  [facility]  [address] [facility] [place] [address]   Phone [phone]   PROTIME-INR    Narrative:     Performed at:  [facility]  [address] [facility] [place] [address]   Phone [phone]   APTT    Narrative:     Performed at:  [facility]  [address] [facility] [place] [address]   Phone [phone]    Narrative:     Macie Cisse tel. [phone],  Rejected Test/Faxed to: [phone], 12/26/2019 20:08, by Kelly George  Cancelled: Testing  As per [facility]: Specimen submitted for Vitamin B1 (Thiamine),   Plasma was hemolyzed when evaluated for testing. Performed at:  [facility]  [address] [facility] [place] [address]   Phone [phone]       All other labs were within normal range or not returned as of this dictation.     EMERGENCY DEPARTMENT COURSE and DIFFERENTIAL DIAGNOSIS/MDM:   Vitals:    Vitals:    12/22/19 1935 12/22/19 2351 12/23/19 0319 12/23/19 0734   BP: 124/72 104/66 (!) 155/84 128/86   Pulse: 74 63 78 81   Resp: 20 18 18 16   Temp: 98.5 °F (36.9 °C) 98.5 °F (36.9 °C) 97.7 °F (36.5 °C) 98.9 °F (37.2 °C)   TempSrc: Oral Oral Oral Oral   SpO2: 94% 100% 96% 95%   Weight:   154 lb 5.2 oz (70 kg)    Height:         Medications   iopamidol (ISOVUE-370) 76 % injection 75 mL (75 mLs Intravenous Given 12/21/19 1557)   sodium chloride 0.9 % 9,436 mL with folic acid 1 mg, adult multi-vitamin with vitamin k 10 mL, thiamine 100 mg ( Intravenous Stopped 12/21/19 2017)   magnesium sulfate 1 g in dextrose 5% 100 mL IVPB (0 g Intravenous Stopped 12/21/19 1912)   influenza quadrivalent split vaccine (FLUZONE;FLUARIX;FLULAVAL;AFLURIA) injection 0.5 mL (0.5 mLs Intramuscular Given 12/23/19 0931)   magnesium sulfate 2 g in 50 mL IVPB premix (0 g Intravenous Stopped 12/22/19 1236)       MDM   Patient was seen and evaluated per myself and Dr. Melo Rosado. Patient arrived with reports of altered mental status. He is awake and alert mildly tachycardic. Intermittently and appropriately follows commands. Expressive aphasia and dysarthria is progressive moderate and intermittent. No evidence of hemiparesis or paralysis. No evidence of facial drooping. No evidence of ocular disconjugate shin or lateralizing gaze. No evidence of loss of proprioception or evidence of neglect. No evidence of trauma. Lung fields were clear. Upper and lower extremities chest wall and abdominal regions negative for evidence of trauma or injury. No evidence of incontinence. Differential diagnosis: Subarachnoid hemorrhage, intracranial hemorrhage, stroke, alcoholic encephalopathy, Warnicke's encephalopathy, electrolyte derangement, thiamine depletion, alcohol withdrawal, postictal/seizure, sepsis    1549: code stroke activated  1555: Spoke with alona Mays team: No indication for TPA at this time, proceed with the Mr. witness protocol #2, TPA will be dependent on the MRI results.   He also recommended a type    2.  Thrombocytopenia (HCC)    3. Leukopenia, unspecified type          DISPOSITION/PLAN   [unfilled]    PATIENT REFERRED TO:  Ben Lang MD  [address]  [phone]    Schedule an appointment as soon as possible for a visit in 1 week        DISCHARGE MEDICATIONS:  Discharge Medication List as of 12/23/2019 12:06 PM      START taking these medications    Details   Magnesium Oxide 400 MG CAPS Po bid, Disp-60 capsule, R-2Normal             (Please note that portions of this note were completed with a voice recognition program.  Efforts were made to edit the dictations but occasionally words are mis-transcribed.)    Rebekah Diaz, [address] [facility], [facility]  12/21/19 651 [facility], [facility]  12/27/19 0338

## 2019-12-22 PROBLEM — D72.819 LEUKOPENIA: Status: ACTIVE | Noted: 2018-06-25

## 2019-12-22 NOTE — PROGRESS NOTES
4 Eyes Skin Assessment     The patient is being assess for  Admission    I agree that 2 RN's have performed a thorough Head to Toe Skin Assessment on the patient. ALL assessment sites listed below have been assessed. Areas assessed by both nurses:   [x]   Head, Face, and Ears   [x]   Shoulders, Back, and Chest  [x]   Arms, Elbows, and Hands   [x]   Coccyx, Sacrum, and IschIum  [x]   Legs, Feet, and Heels        Does the Patient have Skin Breakdown?   No         Tino Prevention initiated:  No   Wound Care Orders initiated:  No      [facility] nurse consulted for Pressure Injury (Stage 3,4, Unstageable, DTI, NWPT, and Complex wounds), New and Established Ostomies:  No      Nurse 1 eSignature: Electronically signed by Romain Miller RN on 12/21/19 at 11:34 PM    **SHARE this note so that the co-signing nurse is able to place an eSignature**    Nurse 2 eSignature: {Esignature:[id number]}

## 2019-12-22 NOTE — PROGRESS NOTES
Pt transferred from ED to [facility]. Oriented to room, call light, hosp policy. Pt expressed understanding, denied questions, denied pain.

## 2019-12-22 NOTE — PROGRESS NOTES
Medication Reconciliation    List of medications patient is currently taking is complete.      Renato Sorenson PharmD, BCPS  12/22/2019 10:18 AM

## 2019-12-22 NOTE — CONSULTS
photophobia. Ears/nose/throat- No dysphagia. No Dysarthria  Cardiovascular- No palpitations. No chest pain  Respiratory- No dyspnea. No Cough  Gastrointestinal- No Abdominal pain. No Vomiting. Genitourinary- No incontinence. No urinary retention  Musculoskeletal- No myalgia. No arthralgia  Skin- No rash. No easy bruising. Psychiatric- No depression. No anxiety  Endocrine- No diabetes. No thyroid issues. Hematologic- No bleeding difficulty. No fatigue  Neurologic- No weakness. No Headache. Exam:  Blood pressure 133/75, pulse 108, temperature 98.2 °F (36.8 °C), temperature source Oral, resp. rate 18, height 6' 3\" (1.905 m), weight 154 lb 12.2 oz (70.2 kg), SpO2 93 %. Constitutional    Vital signs: BP, HR, and RR reviewed   General Alert, no distress, well-nourished  Eyes: fundoscopic exam revealed no hemorrhage   Cardiovascular: pulses symmetric in all 4 extremities. No peripheral edema. Psychiatric: cooperative with examination, no  psychotic behavior noted. Neurologic  Mental status:   orientation to person and place. He knows month but thought it was Thursday (Sunday)   General fund of knowledge  Able to tell me the value of coins but not add them   Memory grossly intact   Attention impaired as he had lots of difficulty with multistep activities but was able to attend to the exam  With sustained eye opening   Language fluent in conversation   Comprehension intact; follows simple commands. Cranial nerves:   CN2: Visual Fields full w/o extinction on confrontational testing,   CN 3,4,6: extraocular muscles intact,  CN5: facial sensation symmetric   CN7:face symmetric without dysarthria,   CN8: hearing grossly intact  CN9: palate elevated symmetrically  CN11: trap full strength on shoulder shrug  CN12: tongue midline with protrusion  Strength: No prontator drift. Good strength in all 4 extremities   Deep tendon reflexes: normal in all 4 extremities  Sensory: light touch intact in all 4 extremities.

## 2019-12-23 NOTE — PROGRESS NOTES
patient dressed self for home  Neighbor friend  Claudia Jaimes ([address])  [phone] at bedside and plans to help patient after discharge.   She has cancelled MOW for today but if patient goes home she was going to call and have it restarted  Patienet is more impulsive at this time but agrees to wait for PT eval prior to discharge

## 2019-12-23 NOTE — CARE COORDINATION
information for patient or family to call with any questions. SW will follow and assist as psychosocial needs arise. Respectfully submitted,    SAURABH Patiño  [facility]   [phone]    Electronically signed by SAURABH Navarrete on 12/23/19 at 12:32 PM

## 2019-12-23 NOTE — PROGRESS NOTES
Occupational Therapy   Occupational Therapy Initial Assessment  Date: 2019   Patient Name: Charlotte Donnelly  MRN: [id number]     : 1950    Date of Service: 2019    Discharge Recommendations:  24 hour supervision or assist, Subacute/Skilled Nursing Facility  OT Equipment Recommendations  Other: TBD    Assessment   Performance deficits / Impairments: Decreased functional mobility ; Decreased cognition;Decreased high-level IADLs;Decreased ADL status; Decreased balance;Decreased safe awareness  Assessment: Pt presents after being found by friend with confusion. Medical work up is ongoing. Pt is demonstrating decreased safety awareness and decreased safety with RW use and endorses multiple falls at home. Do not feel pt is safe to be d/c home without 24 hour supervision currently. Recommend cont OT on long care unit to increase safety prior to d/c home unless 24 hour sup. can be provided  Treatment Diagnosis: decreased adl and functional mobility due to safety; decreased balance and high fall risk  Prognosis: Fair  Decision Making: Medium Complexity  History: see above  Exam: moblity, toileting, ROM/MMT  Assistance / Modification: assist of 1, cues for safety  OT Education: OT Role;Plan of Care;ADL Adaptive Strategies;Orientation;Transfer Training  Patient Education: Safety in room  REQUIRES OT FOLLOW UP: Yes  Activity Tolerance  Activity Tolerance: Treatment limited secondary to decreased cognition  Safety Devices  Safety Devices in place: Yes  Type of devices: Call light within reach; Left in chair;Nurse notified; Chair alarm in place           Patient Diagnosis(es): The primary encounter diagnosis was Altered mental status, unspecified altered mental status type. Diagnoses of Thrombocytopenia ([address] and Leukopenia, unspecified type were also pertinent to this visit.      has a past medical history of Alcohol abuse, COPD (chronic obstructive pulmonary disease) ([address] and Tobacco abuse.   has no past surgical history on file. Treatment Diagnosis: decreased adl and functional mobility due to safety; decreased balance and high fall risk      Restrictions  Restrictions/Precautions  Restrictions/Precautions: Fall Risk, Seizure    Subjective   General  Additional Pertinent Hx: Good Sexton CNP  Family / Caregiver Present: No  Referring Practitioner: Dr. Smith Life  Diagnosis: ALtered mental status  Subjective  Subjective: Agreed to OT. States he doesn't know why he is here but knows what others have told him and is aware of where he is and the date; no complaints     Social/Functional History  Social/Functional History  Lives With: Alone  Type of Home: Apartment  Home Layout: One level, Laundry in basement(first floor apt)  Home Access: Stairs to enter with rails  Entrance Stairs - Number of Steps: 4-5 JOVANNY rail on right  Entrance Stairs - Rails: Right  Bathroom Shower/Tub: Tub/Shower unit, Shower chair with back, Doors  Bathroom Equipment: Grab bars in shower, Shower chair  Home Equipment: Rolling walker, Cane, Alert Button(life alert button on coffee table, uses when out or in pocket in the bathroom.   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

## 2019-12-23 NOTE — PROGRESS NOTES
Elbow Flex/Ext grossly 4+/5  Strength LUE  Strength LUE: WNL  Comment: Shoulder Flex, Elbow Flex/Ext grossly 4+/5     Tone RLE  RLE Tone: Normotonic  Tone LLE  LLE Tone: Normotonic  Motor Control  Gross Motor?: WFL     Bed mobility  Supine to Sit: Stand by assistance  Sit to Supine: Unable to assess     Transfers  Sit to Stand: Stand by assistance;Supervision  Stand to sit: Stand by assistance;Supervision     Ambulation  Ambulation?: Yes  Ambulation 1  Surface: level tile  Device: Rolling Walker  Assistance: Stand by assistance;Supervision  Quality of Gait: Pt ambulated short distances in room, and increased distances in torres with walker support. He required intermittent cueing to stay within walker, but did well to maintain balance thorughout. Distance: 150' x 3    Stairs/Curb  Stairs?: Yes  Stairs  # Steps : 12  Stairs Height: 6\"  Rails: (RUE support throughout)  Assistance: Stand by assistance  Comment: Pt able to ascend/descend 12 steps with RUE support on HR, moving with non-reciprocal pattern, slow speed, no LOB. Plan   Plan  Times per week: 2-3x  Specific instructions for Next Treatment: Safe walker use; stair climb  Current Treatment Recommendations: Functional Mobility Training, Transfer Training, Gait Training, Stair training, Home Exercise Program, Safety Education & Training, Patient/Caregiver Education & Training, Equipment Evaluation, Education, & procurement  Safety Devices  Type of devices: All fall risk precautions in place, Call light within reach, Chair alarm in place, Gait belt, Nurse notified  Restraints  Initially in place: No    AM-PAC Score  AM-PAC Inpatient Mobility Raw Score : 18 (12/23/19 1214)  AM-PAC Inpatient T-Scale Score : 43.63 (12/23/19 1214)  Mobility Inpatient CMS 0-100% Score: 46.58 (12/23/19 1214)  Mobility Inpatient CMS G-Code Modifier : CK (12/23/19 1214)          Goals  Short term goals  Time Frame for Short term goals:  In 2-3 days pt will perform  Short term goal 1:

## 2019-12-23 NOTE — DISCHARGE INSTR - COC
Continuity of Care Form    Patient Name: Laly Banda   :  1950  MRN:  [id number]    [facility] date:  2019  Discharge date: 2019  Code Status Order: Full Code   Advance Directives:   [facility] Documentation     Date/Time Healthcare Directive Type of Healthcare Directive Copy in [address] Agent's Name Healthcare Agent's Phone Number    [phone]  No, patient does not have an advance directive for healthcare treatment -- -- -- -- --          Admitting Physician:  Yvonne Mcgarry MD  PCP: Yvonne Mcgarry MD    Discharging Nurse:Elina VALENTINE  [address] Unit/Room#: H9U-5434/5332-98  Discharging Unit Phone [phone]  Emergency Contact:   Extended Emergency Contact Information  Primary Emergency Contact: Alfonso Santos  Address: [address] [place] [address] Phone: [phone]  Relation: Other  Secondary Emergency Contact: [address] [facility] Phone: [phone]  Relation: Other    Past Surgical History:  History reviewed. No pertinent surgical history.     Immunization History:   Immunization History   Administered Date(s) Administered    Influenza, High Dose (Fluzone 65 yrs and older) 2019    Influenza, Quadv, IM, PF (6 mo and older Fluzone, Flulaval, Fluarix, and 3 yrs and older Afluria) 2019    Pneumococcal Conjugate 13-valent (Oley Mariluz) 2019       Active Problems:  Patient Active Problem List   Diagnosis Code    Closed compression fracture of L1 lumbar vertebra S32.010A    Leukopenia D72.819    Alcohol abuse F10.10    Tobacco abuse Z72.0    COPD (chronic obstructive pulmonary disease) ([facility] J44.9    MRSA infection, UTI A49.02    Sepsis ([address] A41.9    Abnormal brain CT R90.89    Acute cystitis without hematuria N30.00    Closed fracture of right inferior pubic ramus ([facility] S32.591A    Acute hyperactive alcohol withdrawal delirium ([facility] F10.231    Altered mental status R41.82    Candidal dermatitis B37.2    Hypokalemia E87.6    Delirium R41.0    Thrombocytopenia (HCC) D69.6       Isolation/Infection:   Isolation          No Isolation        Patient Infection Status     Infection Onset Added Last Indicated Last Indicated By Review Planned Expiration Resolved Resolved By    None active    Resolved    MRSA  06/22/18 06/22/18 Brittney Wiley RN   07/01/19 Brittney Wiley RN    Urine 6/20/2018          Nurse Assessment:  Last Vital Signs: /86   Pulse 81   Temp 98.9 °F (37.2 °C) (Oral)   Resp 16   Ht 6' 3\" (1.905 m)   Wt 154 lb 5.2 oz (70 kg)   SpO2 95%   BMI 19.29 kg/m²     Last documented pain score (0-10 scale):    Last Weight:   Wt Readings from Last 1 Encounters:   12/23/19 154 lb 5.2 oz (70 kg)     Mental Status:  oriented, alert and able to concentrate and follow conversation    IV Access:  - None    Nursing Mobility/ADLs:  Walking   Independent  Transfer  Independent  Bathing  Independent  Dressing  Independent  [address] Delivery   none    Wound Care Documentation and Therapy:        Elimination:  Continence:   · Bowel: Yes  · Bladder: Yes  Urinary Catheter: None   Colostomy/Ileostomy/Ileal Conduit: No       Date of Last BM: ***    Intake/Output Summary (Last 24 hours) at 12/23/2019 0951  Last data filed at 12/22/2019 2351  Gross per 24 hour   Intake 840 ml   Output 650 ml   Net 190 ml     I/O last 3 completed shifts:   In: 840 [P.O.:840]  Out: 650 [Urine:650]    Safety Concerns:     History of Falls (last 30 days) and History of Seizures    Impairments/Disabilities:      None    Nutrition Therapy:  Current Nutrition Therapy:   - Oral Diet:  General    Routes of Feeding: Oral  Liquids: No Restrictions  Daily Fluid Restriction:   Last Modified Barium Swallow with Video (Video Swallowing Test): not done    Treatments at the Time of Hospital Discharge: Respiratory Treatments: ***  Oxygen Therapy:  is not on home oxygen therapy. Ventilator:        Rehab Therapies: {THERAPEUTIC INTERVENTION:[id number]}  Weight Bearing Status/Restrictions: No weight bearing restirctions  Other Medical Equipment (for information only, NOT a DME order): Other Treatments: ***    Patient's personal belongings (please select all that are sent with patient):  None    RN SIGNATURE:  Electronically signed by Simon Guerrero RN on 12/23/19 at 11:38 AM    CASE MANAGEMENT/SOCIAL WORK SECTION    Inpatient Status Date: ***    Readmission Risk Assessment Score:  Readmission Risk              Risk of Unplanned Readmission:        17         Discharging to Facility/ Agency   · Name: [facility]  · Address: [address] [place] [address]  · Phone: [phone]  · Fax: [phone]    / signature: Electronically signed by SAURABH Martinez on 12/23/2019 at 12:22 PM      PHYSICIAN SECTION    Prognosis: Guarded    Condition at Discharge: Stable    Rehab Potential (if transferring to Rehab): Guarded    Recommended Labs or Other Treatments After Discharge: cbc and cmp 1 wk  Pt  ot    Physician Certification: I certify the above information and transfer of Thien Armstrong  is necessary for the continuing treatment of the diagnosis listed and that he requires [facility] for greater 30 days.      Update Admission H&P: No change in H&P    PHYSICIAN SIGNATURE:  Electronically signed by Annelise Multani MD on 12/23/19 at 9:51 AM

## 2019-12-23 NOTE — ED NOTES
Called stroke team [phone] at , Dr. Bandar Tavarez called back [address] [place] [address]  12/21/19 3303
Pt is not on cardiac monitor, due to patient has removed all wires and will not leave them on.      Priyanka Jewell RN  12/21/19 1941
Respectfully submitted,    Emily MALDONADO, YOVANI-S  [facility]   [phone]    Electronically signed by SAURABH Murphy on 12/23/2019 at 10:33 AM

## 2020-01-01 ENCOUNTER — APPOINTMENT (OUTPATIENT)
Dept: MRI IMAGING | Age: 70
DRG: 552 | End: 2020-01-01
Payer: MEDICARE

## 2020-01-01 ENCOUNTER — APPOINTMENT (OUTPATIENT)
Dept: GENERAL RADIOLOGY | Age: 70
DRG: 480 | End: 2020-01-01
Payer: MEDICARE

## 2020-01-01 ENCOUNTER — OFFICE VISIT (OUTPATIENT)
Dept: ORTHOPEDIC SURGERY | Age: 70
End: 2020-01-01

## 2020-01-01 ENCOUNTER — ANESTHESIA EVENT (OUTPATIENT)
Dept: OPERATING ROOM | Age: 70
DRG: 480 | End: 2020-01-01
Payer: MEDICARE

## 2020-01-01 ENCOUNTER — HOSPITAL ENCOUNTER (INPATIENT)
Age: 70
LOS: 5 days | Discharge: NURSING, REHAB OR LONG-TERM CARE | DRG: 480 | End: 2020-01-23
Attending: EMERGENCY MEDICINE | Admitting: INTERNAL MEDICINE
Payer: MEDICARE

## 2020-01-01 ENCOUNTER — TELEPHONE (OUTPATIENT)
Dept: ORTHOPEDIC SURGERY | Age: 70
End: 2020-01-01

## 2020-01-01 ENCOUNTER — APPOINTMENT (OUTPATIENT)
Dept: GENERAL RADIOLOGY | Age: 70
DRG: 552 | End: 2020-01-01
Payer: MEDICARE

## 2020-01-01 ENCOUNTER — APPOINTMENT (OUTPATIENT)
Dept: CT IMAGING | Age: 70
DRG: 552 | End: 2020-01-01
Payer: MEDICARE

## 2020-01-01 ENCOUNTER — ANESTHESIA (OUTPATIENT)
Dept: OPERATING ROOM | Age: 70
DRG: 480 | End: 2020-01-01
Payer: MEDICARE

## 2020-01-01 ENCOUNTER — HOSPITAL ENCOUNTER (INPATIENT)
Age: 70
LOS: 4 days | Discharge: NURSING, REHAB OR LONG-TERM CARE | DRG: 552 | End: 2020-04-18
Attending: STUDENT IN AN ORGANIZED HEALTH CARE EDUCATION/TRAINING PROGRAM | Admitting: HOSPITALIST
Payer: MEDICARE

## 2020-01-01 ENCOUNTER — CARE COORDINATION (OUTPATIENT)
Dept: CASE MANAGEMENT | Age: 70
End: 2020-01-01

## 2020-01-01 VITALS — HEIGHT: 75 IN | BODY MASS INDEX: 22.7 KG/M2 | WEIGHT: 182.54 LBS | HEART RATE: 72 BPM

## 2020-01-01 VITALS
DIASTOLIC BLOOD PRESSURE: 75 MMHG | SYSTOLIC BLOOD PRESSURE: 124 MMHG | TEMPERATURE: 97.9 F | RESPIRATION RATE: 16 BRPM | WEIGHT: 177.47 LBS | OXYGEN SATURATION: 94 % | HEIGHT: 75 IN | HEART RATE: 84 BPM | BODY MASS INDEX: 22.07 KG/M2

## 2020-01-01 VITALS
SYSTOLIC BLOOD PRESSURE: 103 MMHG | OXYGEN SATURATION: 100 % | TEMPERATURE: 96.6 F | DIASTOLIC BLOOD PRESSURE: 58 MMHG | RESPIRATION RATE: 7 BRPM

## 2020-01-01 VITALS
HEART RATE: 112 BPM | SYSTOLIC BLOOD PRESSURE: 106 MMHG | TEMPERATURE: 100.1 F | HEIGHT: 75 IN | RESPIRATION RATE: 18 BRPM | WEIGHT: 182.54 LBS | DIASTOLIC BLOOD PRESSURE: 65 MMHG | OXYGEN SATURATION: 95 % | BODY MASS INDEX: 22.7 KG/M2

## 2020-01-01 LAB
A/G RATIO: 0.7 (ref 1.1–2.2)
A/G RATIO: 0.9 (ref 1.1–2.2)
A/G RATIO: 0.9 (ref 1.1–2.2)
A/G RATIO: 1.1 (ref 1.1–2.2)
ABO/RH: NORMAL
ABO/RH: NORMAL
ALBUMIN SERPL-MCNC: 2.5 G/DL (ref 3.4–5)
ALBUMIN SERPL-MCNC: 3.3 G/DL (ref 3.4–5)
ALBUMIN SERPL-MCNC: 3.5 G/DL (ref 3.4–5)
ALBUMIN SERPL-MCNC: 3.5 G/DL (ref 3.4–5)
ALP BLD-CCNC: 105 U/L (ref 40–129)
ALP BLD-CCNC: 112 U/L (ref 40–129)
ALP BLD-CCNC: 125 U/L (ref 40–129)
ALP BLD-CCNC: 125 U/L (ref 40–129)
ALT SERPL-CCNC: 17 U/L (ref 10–40)
ALT SERPL-CCNC: 33 U/L (ref 10–40)
ALT SERPL-CCNC: 35 U/L (ref 10–40)
ALT SERPL-CCNC: 43 U/L (ref 10–40)
AMPHETAMINE SCREEN, URINE: NORMAL
ANION GAP SERPL CALCULATED.3IONS-SCNC: 11 MMOL/L (ref 3–16)
ANION GAP SERPL CALCULATED.3IONS-SCNC: 14 MMOL/L (ref 3–16)
ANION GAP SERPL CALCULATED.3IONS-SCNC: 15 MMOL/L (ref 3–16)
ANION GAP SERPL CALCULATED.3IONS-SCNC: 16 MMOL/L (ref 3–16)
ANION GAP SERPL CALCULATED.3IONS-SCNC: 18 MMOL/L (ref 3–16)
ANION GAP SERPL CALCULATED.3IONS-SCNC: 24 MMOL/L (ref 3–16)
ANTIBODY SCREEN: NORMAL
ANTIBODY SCREEN: NORMAL
APTT: 25.1 SEC (ref 24.2–36.2)
AST SERPL-CCNC: 36 U/L (ref 15–37)
AST SERPL-CCNC: 41 U/L (ref 15–37)
AST SERPL-CCNC: 55 U/L (ref 15–37)
AST SERPL-CCNC: 65 U/L (ref 15–37)
BARBITURATE SCREEN URINE: NORMAL
BASE EXCESS VENOUS: -8.1 MMOL/L
BASOPHILS ABSOLUTE: 0 K/UL (ref 0–0.2)
BASOPHILS RELATIVE PERCENT: 0.2 %
BASOPHILS RELATIVE PERCENT: 0.3 %
BASOPHILS RELATIVE PERCENT: 0.4 %
BASOPHILS RELATIVE PERCENT: 0.5 %
BENZODIAZEPINE SCREEN, URINE: NORMAL
BILIRUB SERPL-MCNC: 0.7 MG/DL (ref 0–1)
BILIRUB SERPL-MCNC: 0.8 MG/DL (ref 0–1)
BILIRUB SERPL-MCNC: 0.8 MG/DL (ref 0–1)
BILIRUB SERPL-MCNC: 1.5 MG/DL (ref 0–1)
BILIRUBIN URINE: ABNORMAL
BILIRUBIN URINE: NEGATIVE
BLOOD BANK DISPENSE STATUS: NORMAL
BLOOD BANK PRODUCT CODE: NORMAL
BLOOD, URINE: ABNORMAL
BLOOD, URINE: NEGATIVE
BPU ID: NORMAL
BUN BLDV-MCNC: 10 MG/DL (ref 7–20)
BUN BLDV-MCNC: 11 MG/DL (ref 7–20)
BUN BLDV-MCNC: 23 MG/DL (ref 7–20)
BUN BLDV-MCNC: 24 MG/DL (ref 7–20)
BUN BLDV-MCNC: 3 MG/DL (ref 7–20)
BUN BLDV-MCNC: 5 MG/DL (ref 7–20)
BUN BLDV-MCNC: 6 MG/DL (ref 7–20)
BUN BLDV-MCNC: 9 MG/DL (ref 7–20)
CALCIUM SERPL-MCNC: 7.4 MG/DL (ref 8.3–10.6)
CALCIUM SERPL-MCNC: 8 MG/DL (ref 8.3–10.6)
CALCIUM SERPL-MCNC: 8.2 MG/DL (ref 8.3–10.6)
CALCIUM SERPL-MCNC: 8.6 MG/DL (ref 8.3–10.6)
CALCIUM SERPL-MCNC: 8.7 MG/DL (ref 8.3–10.6)
CALCIUM SERPL-MCNC: 8.7 MG/DL (ref 8.3–10.6)
CALCIUM SERPL-MCNC: 8.9 MG/DL (ref 8.3–10.6)
CALCIUM SERPL-MCNC: 9 MG/DL (ref 8.3–10.6)
CANNABINOID SCREEN URINE: NORMAL
CARBOXYHEMOGLOBIN: 4.4 %
CASTS 2: ABNORMAL /LPF
CASTS UA: ABNORMAL /LPF
CHLORIDE BLD-SCNC: 100 MMOL/L (ref 99–110)
CHLORIDE BLD-SCNC: 102 MMOL/L (ref 99–110)
CHLORIDE BLD-SCNC: 91 MMOL/L (ref 99–110)
CHLORIDE BLD-SCNC: 94 MMOL/L (ref 99–110)
CHLORIDE BLD-SCNC: 96 MMOL/L (ref 99–110)
CHLORIDE BLD-SCNC: 98 MMOL/L (ref 99–110)
CHLORIDE BLD-SCNC: 98 MMOL/L (ref 99–110)
CHLORIDE BLD-SCNC: 99 MMOL/L (ref 99–110)
CLARITY: ABNORMAL
CLARITY: CLEAR
CO2: 13 MMOL/L (ref 21–32)
CO2: 17 MMOL/L (ref 21–32)
CO2: 19 MMOL/L (ref 21–32)
CO2: 22 MMOL/L (ref 21–32)
CO2: 23 MMOL/L (ref 21–32)
CO2: 23 MMOL/L (ref 21–32)
COCAINE METABOLITE SCREEN URINE: NORMAL
COLOR: ABNORMAL
COLOR: ABNORMAL
COMMENT UA: ABNORMAL
CREAT SERPL-MCNC: 0.5 MG/DL (ref 0.8–1.3)
CREAT SERPL-MCNC: 1.6 MG/DL (ref 0.8–1.3)
CREAT SERPL-MCNC: 2.5 MG/DL (ref 0.8–1.3)
CREAT SERPL-MCNC: <0.5 MG/DL (ref 0.8–1.3)
CRYSTALS, UA: ABNORMAL /HPF
DESCRIPTION BLOOD BANK: NORMAL
EKG ATRIAL RATE: 134 BPM
EKG ATRIAL RATE: 75 BPM
EKG DIAGNOSIS: NORMAL
EKG DIAGNOSIS: NORMAL
EKG P AXIS: 78 DEGREES
EKG P-R INTERVAL: 128 MS
EKG P-R INTERVAL: 172 MS
EKG Q-T INTERVAL: 344 MS
EKG Q-T INTERVAL: 434 MS
EKG QRS DURATION: 90 MS
EKG QRS DURATION: 92 MS
EKG QTC CALCULATION (BAZETT): 484 MS
EKG QTC CALCULATION (BAZETT): 513 MS
EKG R AXIS: -6 DEGREES
EKG R AXIS: -6 DEGREES
EKG T AXIS: 71 DEGREES
EKG T AXIS: 80 DEGREES
EKG VENTRICULAR RATE: 134 BPM
EKG VENTRICULAR RATE: 75 BPM
EOSINOPHILS ABSOLUTE: 0 K/UL (ref 0–0.6)
EOSINOPHILS RELATIVE PERCENT: 0 %
EOSINOPHILS RELATIVE PERCENT: 0 %
EOSINOPHILS RELATIVE PERCENT: 0.1 %
EOSINOPHILS RELATIVE PERCENT: 0.2 %
EOSINOPHILS RELATIVE PERCENT: 0.2 %
EOSINOPHILS RELATIVE PERCENT: 0.5 %
EPITHELIAL CELLS, UA: 1 /HPF (ref 0–5)
EPITHELIAL CELLS, UA: 2 /HPF (ref 0–5)
ETHANOL: 157 MG/DL (ref 0–0.08)
FERRITIN: 345.3 NG/ML (ref 30–400)
FOLATE: >20 NG/ML (ref 4.78–24.2)
GFR AFRICAN AMERICAN: 31
GFR AFRICAN AMERICAN: 52
GFR AFRICAN AMERICAN: >60
GFR NON-AFRICAN AMERICAN: 26
GFR NON-AFRICAN AMERICAN: 43
GFR NON-AFRICAN AMERICAN: >60
GLOBULIN: 2.9 G/DL
GLOBULIN: 3.5 G/DL
GLOBULIN: 3.8 G/DL
GLOBULIN: 4 G/DL
GLUCOSE BLD-MCNC: 110 MG/DL (ref 70–99)
GLUCOSE BLD-MCNC: 112 MG/DL (ref 70–99)
GLUCOSE BLD-MCNC: 113 MG/DL (ref 70–99)
GLUCOSE BLD-MCNC: 116 MG/DL (ref 70–99)
GLUCOSE BLD-MCNC: 116 MG/DL (ref 70–99)
GLUCOSE BLD-MCNC: 139 MG/DL (ref 70–99)
GLUCOSE BLD-MCNC: 85 MG/DL (ref 70–99)
GLUCOSE BLD-MCNC: 92 MG/DL (ref 70–99)
GLUCOSE BLD-MCNC: 97 MG/DL (ref 70–99)
GLUCOSE URINE: NEGATIVE MG/DL
GLUCOSE URINE: NEGATIVE MG/DL
HCO3 VENOUS: 17 MMOL/L (ref 23–29)
HCT VFR BLD CALC: 18.7 % (ref 40.5–52.5)
HCT VFR BLD CALC: 20.4 % (ref 40.5–52.5)
HCT VFR BLD CALC: 20.6 % (ref 40.5–52.5)
HCT VFR BLD CALC: 20.6 % (ref 40.5–52.5)
HCT VFR BLD CALC: 21 % (ref 40.5–52.5)
HCT VFR BLD CALC: 21.8 % (ref 40.5–52.5)
HCT VFR BLD CALC: 23.3 % (ref 40.5–52.5)
HCT VFR BLD CALC: 24.7 % (ref 40.5–52.5)
HCT VFR BLD CALC: 24.9 % (ref 40.5–52.5)
HCT VFR BLD CALC: 25.5 % (ref 40.5–52.5)
HCT VFR BLD CALC: 25.7 % (ref 40.5–52.5)
HCT VFR BLD CALC: 25.8 % (ref 40.5–52.5)
HCT VFR BLD CALC: 26.1 % (ref 40.5–52.5)
HCT VFR BLD CALC: 26.6 % (ref 40.5–52.5)
HCT VFR BLD CALC: 27.2 % (ref 40.5–52.5)
HCT VFR BLD CALC: 33.4 % (ref 40.5–52.5)
HCT VFR BLD CALC: 38.5 % (ref 40.5–52.5)
HCT VFR BLD CALC: 39.8 % (ref 40.5–52.5)
HCT VFR BLD CALC: 41.6 % (ref 40.5–52.5)
HEMOGLOBIN: 11.2 G/DL (ref 13.5–17.5)
HEMOGLOBIN: 13 G/DL (ref 13.5–17.5)
HEMOGLOBIN: 13.3 G/DL (ref 13.5–17.5)
HEMOGLOBIN: 13.9 G/DL (ref 13.5–17.5)
HEMOGLOBIN: 6.4 G/DL (ref 13.5–17.5)
HEMOGLOBIN: 7 G/DL (ref 13.5–17.5)
HEMOGLOBIN: 7.2 G/DL (ref 13.5–17.5)
HEMOGLOBIN: 7.4 G/DL (ref 13.5–17.5)
HEMOGLOBIN: 8 G/DL (ref 13.5–17.5)
HEMOGLOBIN: 8.4 G/DL (ref 13.5–17.5)
HEMOGLOBIN: 8.5 G/DL (ref 13.5–17.5)
HEMOGLOBIN: 8.7 G/DL (ref 13.5–17.5)
HEMOGLOBIN: 8.8 G/DL (ref 13.5–17.5)
HEMOGLOBIN: 8.9 G/DL (ref 13.5–17.5)
HEMOGLOBIN: 9 G/DL (ref 13.5–17.5)
HEMOGLOBIN: 9 G/DL (ref 13.5–17.5)
HEMOGLOBIN: 9.2 G/DL (ref 13.5–17.5)
HYALINE CASTS: 1 /LPF (ref 0–8)
INR BLD: 0.98 (ref 0.86–1.14)
IRON SATURATION: 13 % (ref 20–50)
IRON: 24 UG/DL (ref 59–158)
KETONES, URINE: >=80 MG/DL
KETONES, URINE: NEGATIVE MG/DL
LACTIC ACID: 2 MMOL/L (ref 0.4–2)
LACTIC ACID: 3.6 MMOL/L (ref 0.4–2)
LEUKOCYTE ESTERASE, URINE: NEGATIVE
LEUKOCYTE ESTERASE, URINE: NEGATIVE
LV EF: 50 %
LVEF MODALITY: NORMAL
LYMPHOCYTES ABSOLUTE: 0.7 K/UL (ref 1–5.1)
LYMPHOCYTES ABSOLUTE: 0.8 K/UL (ref 1–5.1)
LYMPHOCYTES ABSOLUTE: 1 K/UL (ref 1–5.1)
LYMPHOCYTES ABSOLUTE: 1.9 K/UL (ref 1–5.1)
LYMPHOCYTES RELATIVE PERCENT: 17.5 %
LYMPHOCYTES RELATIVE PERCENT: 17.8 %
LYMPHOCYTES RELATIVE PERCENT: 21.5 %
LYMPHOCYTES RELATIVE PERCENT: 22.9 %
LYMPHOCYTES RELATIVE PERCENT: 24 %
LYMPHOCYTES RELATIVE PERCENT: 24.5 %
Lab: NORMAL
MAGNESIUM: 1.4 MG/DL (ref 1.8–2.4)
MAGNESIUM: 1.5 MG/DL (ref 1.8–2.4)
MAGNESIUM: 1.6 MG/DL (ref 1.8–2.4)
MAGNESIUM: 1.7 MG/DL (ref 1.8–2.4)
MAGNESIUM: 2.1 MG/DL (ref 1.8–2.4)
MCH RBC QN AUTO: 32.6 PG (ref 26–34)
MCH RBC QN AUTO: 32.6 PG (ref 26–34)
MCH RBC QN AUTO: 33 PG (ref 26–34)
MCH RBC QN AUTO: 33.5 PG (ref 26–34)
MCH RBC QN AUTO: 34.4 PG (ref 26–34)
MCH RBC QN AUTO: 34.7 PG (ref 26–34)
MCH RBC QN AUTO: 35.3 PG (ref 26–34)
MCH RBC QN AUTO: 35.7 PG (ref 26–34)
MCH RBC QN AUTO: 35.9 PG (ref 26–34)
MCHC RBC AUTO-ENTMCNC: 33.3 G/DL (ref 31–36)
MCHC RBC AUTO-ENTMCNC: 33.4 G/DL (ref 31–36)
MCHC RBC AUTO-ENTMCNC: 33.5 G/DL (ref 31–36)
MCHC RBC AUTO-ENTMCNC: 33.7 G/DL (ref 31–36)
MCHC RBC AUTO-ENTMCNC: 33.9 G/DL (ref 31–36)
MCHC RBC AUTO-ENTMCNC: 34 G/DL (ref 31–36)
MCHC RBC AUTO-ENTMCNC: 34.1 G/DL (ref 31–36)
MCHC RBC AUTO-ENTMCNC: 34.4 G/DL (ref 31–36)
MCHC RBC AUTO-ENTMCNC: 34.6 G/DL (ref 31–36)
MCV RBC AUTO: 101.7 FL (ref 80–100)
MCV RBC AUTO: 104.4 FL (ref 80–100)
MCV RBC AUTO: 105 FL (ref 80–100)
MCV RBC AUTO: 105.9 FL (ref 80–100)
MCV RBC AUTO: 96.7 FL (ref 80–100)
MCV RBC AUTO: 97.6 FL (ref 80–100)
MCV RBC AUTO: 98.8 FL (ref 80–100)
MCV RBC AUTO: 98.9 FL (ref 80–100)
MCV RBC AUTO: 99.3 FL (ref 80–100)
METHADONE SCREEN, URINE: NORMAL
METHEMOGLOBIN VENOUS: 0.6 %
MICROSCOPIC EXAMINATION: YES
MICROSCOPIC EXAMINATION: YES
MONOCYTES ABSOLUTE: 0.4 K/UL (ref 0–1.3)
MONOCYTES ABSOLUTE: 0.6 K/UL (ref 0–1.3)
MONOCYTES ABSOLUTE: 0.8 K/UL (ref 0–1.3)
MONOCYTES ABSOLUTE: 1.4 K/UL (ref 0–1.3)
MONOCYTES RELATIVE PERCENT: 12.2 %
MONOCYTES RELATIVE PERCENT: 13.6 %
MONOCYTES RELATIVE PERCENT: 15.5 %
MONOCYTES RELATIVE PERCENT: 16.9 %
MONOCYTES RELATIVE PERCENT: 19.1 %
MONOCYTES RELATIVE PERCENT: 9.1 %
NEUTROPHILS ABSOLUTE: 1.7 K/UL (ref 1.7–7.7)
NEUTROPHILS ABSOLUTE: 2.3 K/UL (ref 1.7–7.7)
NEUTROPHILS ABSOLUTE: 2.4 K/UL (ref 1.7–7.7)
NEUTROPHILS ABSOLUTE: 3 K/UL (ref 1.7–7.7)
NEUTROPHILS ABSOLUTE: 3.2 K/UL (ref 1.7–7.7)
NEUTROPHILS ABSOLUTE: 5 K/UL (ref 1.7–7.7)
NEUTROPHILS RELATIVE PERCENT: 56.4 %
NEUTROPHILS RELATIVE PERCENT: 59.1 %
NEUTROPHILS RELATIVE PERCENT: 59.6 %
NEUTROPHILS RELATIVE PERCENT: 65.8 %
NEUTROPHILS RELATIVE PERCENT: 68.3 %
NEUTROPHILS RELATIVE PERCENT: 73.2 %
NITRITE, URINE: NEGATIVE
NITRITE, URINE: NEGATIVE
O2 CONTENT, VEN: 12 ML/DL
O2 SAT, VEN: 97 %
O2 THERAPY: ABNORMAL
OPIATE SCREEN URINE: NORMAL
OXYCODONE URINE: NORMAL
PCO2, VEN: 34.4 MMHG (ref 40–50)
PDW BLD-RTO: 14.7 % (ref 12.4–15.4)
PDW BLD-RTO: 14.8 % (ref 12.4–15.4)
PDW BLD-RTO: 15.4 % (ref 12.4–15.4)
PDW BLD-RTO: 16.6 % (ref 12.4–15.4)
PDW BLD-RTO: 16.6 % (ref 12.4–15.4)
PDW BLD-RTO: 17.6 % (ref 12.4–15.4)
PDW BLD-RTO: 18.1 % (ref 12.4–15.4)
PDW BLD-RTO: 18.6 % (ref 12.4–15.4)
PDW BLD-RTO: 18.6 % (ref 12.4–15.4)
PERFORMED ON: ABNORMAL
PH UA: 5
PH UA: 5 (ref 5–8)
PH UA: 6 (ref 5–8)
PH VENOUS: 7.31 (ref 7.35–7.45)
PHENCYCLIDINE SCREEN URINE: NORMAL
PLATELET # BLD: 102 K/UL (ref 135–450)
PLATELET # BLD: 51 K/UL (ref 135–450)
PLATELET # BLD: 52 K/UL (ref 135–450)
PLATELET # BLD: 52 K/UL (ref 135–450)
PLATELET # BLD: 58 K/UL (ref 135–450)
PLATELET # BLD: 74 K/UL (ref 135–450)
PLATELET # BLD: 76 K/UL (ref 135–450)
PLATELET # BLD: 80 K/UL (ref 135–450)
PLATELET # BLD: 87 K/UL (ref 135–450)
PMV BLD AUTO: 10 FL (ref 5–10.5)
PMV BLD AUTO: 10.3 FL (ref 5–10.5)
PMV BLD AUTO: 8.9 FL (ref 5–10.5)
PMV BLD AUTO: 9.3 FL (ref 5–10.5)
PMV BLD AUTO: 9.4 FL (ref 5–10.5)
PMV BLD AUTO: 9.6 FL (ref 5–10.5)
PO2, VEN: 95 MMHG
POTASSIUM REFLEX MAGNESIUM: 3 MMOL/L (ref 3.5–5.1)
POTASSIUM REFLEX MAGNESIUM: 3.4 MMOL/L (ref 3.5–5.1)
POTASSIUM REFLEX MAGNESIUM: 3.4 MMOL/L (ref 3.5–5.1)
POTASSIUM REFLEX MAGNESIUM: 3.9 MMOL/L (ref 3.5–5.1)
POTASSIUM REFLEX MAGNESIUM: 4.4 MMOL/L (ref 3.5–5.1)
POTASSIUM REFLEX MAGNESIUM: 4.4 MMOL/L (ref 3.5–5.1)
POTASSIUM REFLEX MAGNESIUM: 5 MMOL/L (ref 3.5–5.1)
POTASSIUM SERPL-SCNC: 3.8 MMOL/L (ref 3.5–5.1)
PROCALCITONIN: 0.15 NG/ML (ref 0–0.15)
PROPOXYPHENE SCREEN: NORMAL
PROTEIN UA: 30 MG/DL
PROTEIN UA: 30 MG/DL
PROTHROMBIN TIME: 11.4 SEC (ref 10–13.2)
RBC # BLD: 1.95 M/UL (ref 4.2–5.9)
RBC # BLD: 1.97 M/UL (ref 4.2–5.9)
RBC # BLD: 2.09 M/UL (ref 4.2–5.9)
RBC # BLD: 2.14 M/UL (ref 4.2–5.9)
RBC # BLD: 2.48 M/UL (ref 4.2–5.9)
RBC # BLD: 3.15 M/UL (ref 4.2–5.9)
RBC # BLD: 3.98 M/UL (ref 4.2–5.9)
RBC # BLD: 4.02 M/UL (ref 4.2–5.9)
RBC # BLD: 4.26 M/UL (ref 4.2–5.9)
RBC UA: 2 /HPF (ref 0–4)
RBC UA: 2 /HPF (ref 0–4)
SODIUM BLD-SCNC: 129 MMOL/L (ref 136–145)
SODIUM BLD-SCNC: 131 MMOL/L (ref 136–145)
SODIUM BLD-SCNC: 131 MMOL/L (ref 136–145)
SODIUM BLD-SCNC: 133 MMOL/L (ref 136–145)
SODIUM BLD-SCNC: 135 MMOL/L (ref 136–145)
SODIUM BLD-SCNC: 136 MMOL/L (ref 136–145)
SPECIFIC GRAVITY UA: 1.02 (ref 1–1.03)
SPECIFIC GRAVITY UA: 1.03 (ref 1–1.03)
TCO2 CALC VENOUS: 18 MMOL/L
TOTAL CK: 110 U/L (ref 39–308)
TOTAL CK: 227 U/L (ref 39–308)
TOTAL IRON BINDING CAPACITY: 185 UG/DL (ref 260–445)
TOTAL PROTEIN: 6 G/DL (ref 6.4–8.2)
TOTAL PROTEIN: 6.2 G/DL (ref 6.4–8.2)
TOTAL PROTEIN: 7.3 G/DL (ref 6.4–8.2)
TOTAL PROTEIN: 7.5 G/DL (ref 6.4–8.2)
TROPONIN: <0.01 NG/ML
URINE REFLEX TO CULTURE: ABNORMAL
URINE REFLEX TO CULTURE: ABNORMAL
URINE TYPE: ABNORMAL
URINE TYPE: ABNORMAL
UROBILINOGEN, URINE: 1 E.U./DL
UROBILINOGEN, URINE: 1 E.U./DL
VITAMIN B-12: 346 PG/ML (ref 211–911)
VITAMIN D 25-HYDROXY: 12 NG/ML
WBC # BLD: 2.9 K/UL (ref 4–11)
WBC # BLD: 3.6 K/UL (ref 4–11)
WBC # BLD: 3.7 K/UL (ref 4–11)
WBC # BLD: 3.9 K/UL (ref 4–11)
WBC # BLD: 4 K/UL (ref 4–11)
WBC # BLD: 4.1 K/UL (ref 4–11)
WBC # BLD: 4.3 K/UL (ref 4–11)
WBC # BLD: 4.4 K/UL (ref 4–11)
WBC # BLD: 8.4 K/UL (ref 4–11)
WBC UA: 1 /HPF (ref 0–5)
WBC UA: 4 /HPF (ref 0–5)

## 2020-01-01 PROCEDURE — 83550 IRON BINDING TEST: CPT

## 2020-01-01 PROCEDURE — 2720000010 HC SURG SUPPLY STERILE: Performed by: ORTHOPAEDIC SURGERY

## 2020-01-01 PROCEDURE — 6360000002 HC RX W HCPCS: Performed by: FAMILY MEDICINE

## 2020-01-01 PROCEDURE — 6360000002 HC RX W HCPCS: Performed by: INTERNAL MEDICINE

## 2020-01-01 PROCEDURE — 6360000002 HC RX W HCPCS: Performed by: NURSE PRACTITIONER

## 2020-01-01 PROCEDURE — 80048 BASIC METABOLIC PNL TOTAL CA: CPT

## 2020-01-01 PROCEDURE — 36415 COLL VENOUS BLD VENIPUNCTURE: CPT

## 2020-01-01 PROCEDURE — 2500000003 HC RX 250 WO HCPCS: Performed by: NURSE PRACTITIONER

## 2020-01-01 PROCEDURE — 3209999900 CT THORACIC SPINE TRAUMA RECONSTRUCTION

## 2020-01-01 PROCEDURE — 6370000000 HC RX 637 (ALT 250 FOR IP): Performed by: NURSE PRACTITIONER

## 2020-01-01 PROCEDURE — P9016 RBC LEUKOCYTES REDUCED: HCPCS

## 2020-01-01 PROCEDURE — 85014 HEMATOCRIT: CPT

## 2020-01-01 PROCEDURE — 6360000002 HC RX W HCPCS: Performed by: ORTHOPAEDIC SURGERY

## 2020-01-01 PROCEDURE — 2580000003 HC RX 258: Performed by: HOSPITALIST

## 2020-01-01 PROCEDURE — 97530 THERAPEUTIC ACTIVITIES: CPT

## 2020-01-01 PROCEDURE — 0QSFXZZ REPOSITION LEFT PATELLA, EXTERNAL APPROACH: ICD-10-PCS | Performed by: ORTHOPAEDIC SURGERY

## 2020-01-01 PROCEDURE — 36430 TRANSFUSION BLD/BLD COMPNT: CPT

## 2020-01-01 PROCEDURE — 2580000003 HC RX 258: Performed by: ORTHOPAEDIC SURGERY

## 2020-01-01 PROCEDURE — 6370000000 HC RX 637 (ALT 250 FOR IP): Performed by: FAMILY MEDICINE

## 2020-01-01 PROCEDURE — C1769 GUIDE WIRE: HCPCS | Performed by: ORTHOPAEDIC SURGERY

## 2020-01-01 PROCEDURE — 86850 RBC ANTIBODY SCREEN: CPT

## 2020-01-01 PROCEDURE — 82746 ASSAY OF FOLIC ACID SERUM: CPT

## 2020-01-01 PROCEDURE — 6360000002 HC RX W HCPCS: Performed by: NURSE ANESTHETIST, CERTIFIED REGISTERED

## 2020-01-01 PROCEDURE — 7100000001 HC PACU RECOVERY - ADDTL 15 MIN: Performed by: ORTHOPAEDIC SURGERY

## 2020-01-01 PROCEDURE — 82728 ASSAY OF FERRITIN: CPT

## 2020-01-01 PROCEDURE — P9045 ALBUMIN (HUMAN), 5%, 250 ML: HCPCS | Performed by: NURSE ANESTHETIST, CERTIFIED REGISTERED

## 2020-01-01 PROCEDURE — 85025 COMPLETE CBC W/AUTO DIFF WBC: CPT

## 2020-01-01 PROCEDURE — 97535 SELF CARE MNGMENT TRAINING: CPT

## 2020-01-01 PROCEDURE — 51798 US URINE CAPACITY MEASURE: CPT

## 2020-01-01 PROCEDURE — 6360000004 HC RX CONTRAST MEDICATION: Performed by: STUDENT IN AN ORGANIZED HEALTH CARE EDUCATION/TRAINING PROGRAM

## 2020-01-01 PROCEDURE — 70486 CT MAXILLOFACIAL W/O DYE: CPT

## 2020-01-01 PROCEDURE — 86900 BLOOD TYPING SEROLOGIC ABO: CPT

## 2020-01-01 PROCEDURE — 97166 OT EVAL MOD COMPLEX 45 MIN: CPT

## 2020-01-01 PROCEDURE — 83735 ASSAY OF MAGNESIUM: CPT

## 2020-01-01 PROCEDURE — 80307 DRUG TEST PRSMV CHEM ANLYZR: CPT

## 2020-01-01 PROCEDURE — 99024 POSTOP FOLLOW-UP VISIT: CPT | Performed by: NURSE PRACTITIONER

## 2020-01-01 PROCEDURE — 82803 BLOOD GASES ANY COMBINATION: CPT

## 2020-01-01 PROCEDURE — 6360000002 HC RX W HCPCS: Performed by: HOSPITALIST

## 2020-01-01 PROCEDURE — 1200000000 HC SEMI PRIVATE

## 2020-01-01 PROCEDURE — 94760 N-INVAS EAR/PLS OXIMETRY 1: CPT

## 2020-01-01 PROCEDURE — 72146 MRI CHEST SPINE W/O DYE: CPT

## 2020-01-01 PROCEDURE — 6370000000 HC RX 637 (ALT 250 FOR IP): Performed by: ORTHOPAEDIC SURGERY

## 2020-01-01 PROCEDURE — 6370000000 HC RX 637 (ALT 250 FOR IP): Performed by: INTERNAL MEDICINE

## 2020-01-01 PROCEDURE — 73552 X-RAY EXAM OF FEMUR 2/>: CPT

## 2020-01-01 PROCEDURE — 3600000004 HC SURGERY LEVEL 4 BASE: Performed by: ORTHOPAEDIC SURGERY

## 2020-01-01 PROCEDURE — G0480 DRUG TEST DEF 1-7 CLASSES: HCPCS

## 2020-01-01 PROCEDURE — 83605 ASSAY OF LACTIC ACID: CPT

## 2020-01-01 PROCEDURE — 97162 PT EVAL MOD COMPLEX 30 MIN: CPT

## 2020-01-01 PROCEDURE — 27245 TREAT THIGH FRACTURE: CPT | Performed by: NURSE PRACTITIONER

## 2020-01-01 PROCEDURE — 97110 THERAPEUTIC EXERCISES: CPT

## 2020-01-01 PROCEDURE — 27245 TREAT THIGH FRACTURE: CPT | Performed by: ORTHOPAEDIC SURGERY

## 2020-01-01 PROCEDURE — 99285 EMERGENCY DEPT VISIT HI MDM: CPT

## 2020-01-01 PROCEDURE — 3209999900 CT LUMBAR SPINE TRAUMA RECONSTRUCTION

## 2020-01-01 PROCEDURE — 2580000003 HC RX 258: Performed by: INTERNAL MEDICINE

## 2020-01-01 PROCEDURE — 3700000000 HC ANESTHESIA ATTENDED CARE: Performed by: ORTHOPAEDIC SURGERY

## 2020-01-01 PROCEDURE — 82550 ASSAY OF CK (CPK): CPT

## 2020-01-01 PROCEDURE — 2500000003 HC RX 250 WO HCPCS: Performed by: INTERNAL MEDICINE

## 2020-01-01 PROCEDURE — 73502 X-RAY EXAM HIP UNI 2-3 VIEWS: CPT

## 2020-01-01 PROCEDURE — 73560 X-RAY EXAM OF KNEE 1 OR 2: CPT

## 2020-01-01 PROCEDURE — 2709999900 HC NON-CHARGEABLE SUPPLY: Performed by: ORTHOPAEDIC SURGERY

## 2020-01-01 PROCEDURE — 86923 COMPATIBILITY TEST ELECTRIC: CPT

## 2020-01-01 PROCEDURE — 85018 HEMOGLOBIN: CPT

## 2020-01-01 PROCEDURE — 99223 1ST HOSP IP/OBS HIGH 75: CPT | Performed by: INTERNAL MEDICINE

## 2020-01-01 PROCEDURE — 2580000003 HC RX 258: Performed by: EMERGENCY MEDICINE

## 2020-01-01 PROCEDURE — 74177 CT ABD & PELVIS W/CONTRAST: CPT

## 2020-01-01 PROCEDURE — 2580000003 HC RX 258: Performed by: FAMILY MEDICINE

## 2020-01-01 PROCEDURE — 71045 X-RAY EXAM CHEST 1 VIEW: CPT

## 2020-01-01 PROCEDURE — 6370000000 HC RX 637 (ALT 250 FOR IP): Performed by: HOSPITALIST

## 2020-01-01 PROCEDURE — 86901 BLOOD TYPING SEROLOGIC RH(D): CPT

## 2020-01-01 PROCEDURE — 93010 ELECTROCARDIOGRAM REPORT: CPT | Performed by: INTERNAL MEDICINE

## 2020-01-01 PROCEDURE — 2500000003 HC RX 250 WO HCPCS: Performed by: NURSE ANESTHETIST, CERTIFIED REGISTERED

## 2020-01-01 PROCEDURE — 85027 COMPLETE CBC AUTOMATED: CPT

## 2020-01-01 PROCEDURE — 82607 VITAMIN B-12: CPT

## 2020-01-01 PROCEDURE — 72125 CT NECK SPINE W/O DYE: CPT

## 2020-01-01 PROCEDURE — 97116 GAIT TRAINING THERAPY: CPT

## 2020-01-01 PROCEDURE — 83540 ASSAY OF IRON: CPT

## 2020-01-01 PROCEDURE — 71250 CT THORAX DX C-: CPT

## 2020-01-01 PROCEDURE — 2580000003 HC RX 258: Performed by: NURSE ANESTHETIST, CERTIFIED REGISTERED

## 2020-01-01 PROCEDURE — 84145 PROCALCITONIN (PCT): CPT

## 2020-01-01 PROCEDURE — C1776 JOINT DEVICE (IMPLANTABLE): HCPCS | Performed by: ORTHOPAEDIC SURGERY

## 2020-01-01 PROCEDURE — 85730 THROMBOPLASTIN TIME PARTIAL: CPT

## 2020-01-01 PROCEDURE — 85610 PROTHROMBIN TIME: CPT

## 2020-01-01 PROCEDURE — 73521 X-RAY EXAM HIPS BI 2 VIEWS: CPT

## 2020-01-01 PROCEDURE — 2500000003 HC RX 250 WO HCPCS: Performed by: ORTHOPAEDIC SURGERY

## 2020-01-01 PROCEDURE — 96374 THER/PROPH/DIAG INJ IV PUSH: CPT

## 2020-01-01 PROCEDURE — 6360000002 HC RX W HCPCS: Performed by: EMERGENCY MEDICINE

## 2020-01-01 PROCEDURE — 80053 COMPREHEN METABOLIC PANEL: CPT

## 2020-01-01 PROCEDURE — 72148 MRI LUMBAR SPINE W/O DYE: CPT

## 2020-01-01 PROCEDURE — 82306 VITAMIN D 25 HYDROXY: CPT

## 2020-01-01 PROCEDURE — 94150 VITAL CAPACITY TEST: CPT

## 2020-01-01 PROCEDURE — 0QS704Z REPOSITION LEFT UPPER FEMUR WITH INTERNAL FIXATION DEVICE, OPEN APPROACH: ICD-10-PCS | Performed by: ORTHOPAEDIC SURGERY

## 2020-01-01 PROCEDURE — 81001 URINALYSIS AUTO W/SCOPE: CPT

## 2020-01-01 PROCEDURE — 7100000000 HC PACU RECOVERY - FIRST 15 MIN: Performed by: ORTHOPAEDIC SURGERY

## 2020-01-01 PROCEDURE — 70450 CT HEAD/BRAIN W/O DYE: CPT

## 2020-01-01 PROCEDURE — 3700000001 HC ADD 15 MINUTES (ANESTHESIA): Performed by: ORTHOPAEDIC SURGERY

## 2020-01-01 PROCEDURE — 93005 ELECTROCARDIOGRAM TRACING: CPT | Performed by: EMERGENCY MEDICINE

## 2020-01-01 PROCEDURE — 2580000003 HC RX 258: Performed by: NURSE PRACTITIONER

## 2020-01-01 PROCEDURE — 99221 1ST HOSP IP/OBS SF/LOW 40: CPT | Performed by: NURSE PRACTITIONER

## 2020-01-01 PROCEDURE — 84484 ASSAY OF TROPONIN QUANT: CPT

## 2020-01-01 PROCEDURE — 99231 SBSQ HOSP IP/OBS SF/LOW 25: CPT | Performed by: OTOLARYNGOLOGY

## 2020-01-01 PROCEDURE — 3209999900 FLUORO FOR SURGICAL PROCEDURES

## 2020-01-01 PROCEDURE — 93005 ELECTROCARDIOGRAM TRACING: CPT | Performed by: STUDENT IN AN ORGANIZED HEALTH CARE EDUCATION/TRAINING PROGRAM

## 2020-01-01 PROCEDURE — 93306 TTE W/DOPPLER COMPLETE: CPT

## 2020-01-01 PROCEDURE — 3600000014 HC SURGERY LEVEL 4 ADDTL 15MIN: Performed by: ORTHOPAEDIC SURGERY

## 2020-01-01 PROCEDURE — C1713 ANCHOR/SCREW BN/BN,TIS/BN: HCPCS | Performed by: ORTHOPAEDIC SURGERY

## 2020-01-01 PROCEDURE — 27520 TREAT KNEECAP FRACTURE: CPT | Performed by: ORTHOPAEDIC SURGERY

## 2020-01-01 PROCEDURE — L1830 KO IMMOB CANVAS LONG PRE OTS: HCPCS | Performed by: ORTHOPAEDIC SURGERY

## 2020-01-01 DEVICE — LONG NAIL KIT R1.5, TI, LEFT
Type: IMPLANTABLE DEVICE | Site: FEMUR | Status: FUNCTIONAL
Brand: GAMMA

## 2020-01-01 DEVICE — LAG SCREW, TI
Type: IMPLANTABLE DEVICE | Site: FEMUR | Status: FUNCTIONAL
Brand: GAMMA

## 2020-01-01 DEVICE — LOCKING SCREW, FULLY THREADED: Type: IMPLANTABLE DEVICE | Site: FEMUR | Status: FUNCTIONAL

## 2020-01-01 RX ORDER — FOLIC ACID 1 MG/1
1 TABLET ORAL DAILY
Status: DISCONTINUED | OUTPATIENT
Start: 2020-01-01 | End: 2020-01-01 | Stop reason: HOSPADM

## 2020-01-01 RX ORDER — LORAZEPAM 1 MG/1
2 TABLET ORAL
Status: DISCONTINUED | OUTPATIENT
Start: 2020-01-01 | End: 2020-01-01

## 2020-01-01 RX ORDER — FENTANYL CITRATE 50 UG/ML
50 INJECTION, SOLUTION INTRAMUSCULAR; INTRAVENOUS EVERY 5 MIN PRN
Status: DISCONTINUED | OUTPATIENT
Start: 2020-01-01 | End: 2020-01-01 | Stop reason: HOSPADM

## 2020-01-01 RX ORDER — KETOROLAC TROMETHAMINE 15 MG/ML
15 INJECTION, SOLUTION INTRAMUSCULAR; INTRAVENOUS EVERY 6 HOURS PRN
Status: DISPENSED | OUTPATIENT
Start: 2020-01-01 | End: 2020-01-01

## 2020-01-01 RX ORDER — IPRATROPIUM BROMIDE AND ALBUTEROL SULFATE 2.5; .5 MG/3ML; MG/3ML
1 SOLUTION RESPIRATORY (INHALATION) EVERY 4 HOURS PRN
Status: DISCONTINUED | OUTPATIENT
Start: 2020-01-01 | End: 2020-01-01 | Stop reason: HOSPADM

## 2020-01-01 RX ORDER — MULTIVITAMIN WITH FOLIC ACID 400 MCG
1 TABLET ORAL DAILY
Status: DISCONTINUED | OUTPATIENT
Start: 2020-01-01 | End: 2020-01-01 | Stop reason: HOSPADM

## 2020-01-01 RX ORDER — 0.9 % SODIUM CHLORIDE 0.9 %
20 INTRAVENOUS SOLUTION INTRAVENOUS ONCE
Status: COMPLETED | OUTPATIENT
Start: 2020-01-01 | End: 2020-01-01

## 2020-01-01 RX ORDER — TRAMADOL HYDROCHLORIDE 50 MG/1
50 TABLET ORAL EVERY 6 HOURS PRN
Qty: 20 TABLET | Refills: 0 | Status: SHIPPED | OUTPATIENT
Start: 2020-01-01 | End: 2020-01-01

## 2020-01-01 RX ORDER — LORAZEPAM 2 MG/ML
4 INJECTION INTRAMUSCULAR
Status: DISCONTINUED | OUTPATIENT
Start: 2020-01-01 | End: 2020-01-01

## 2020-01-01 RX ORDER — PSEUDOEPHEDRINE HCL 30 MG
100 TABLET ORAL DAILY
Qty: 30 CAPSULE | Refills: 1 | Status: SHIPPED | OUTPATIENT
Start: 2020-01-01 | End: 2020-01-01 | Stop reason: SDUPTHER

## 2020-01-01 RX ORDER — LORAZEPAM 2 MG/ML
1 INJECTION INTRAMUSCULAR ONCE
Status: COMPLETED | OUTPATIENT
Start: 2020-01-01 | End: 2020-01-01

## 2020-01-01 RX ORDER — SODIUM CHLORIDE 9 MG/ML
INJECTION, SOLUTION INTRAVENOUS CONTINUOUS PRN
Status: DISCONTINUED | OUTPATIENT
Start: 2020-01-01 | End: 2020-01-01 | Stop reason: SDUPTHER

## 2020-01-01 RX ORDER — ROCURONIUM BROMIDE 10 MG/ML
INJECTION, SOLUTION INTRAVENOUS PRN
Status: DISCONTINUED | OUTPATIENT
Start: 2020-01-01 | End: 2020-01-01 | Stop reason: SDUPTHER

## 2020-01-01 RX ORDER — LORAZEPAM 1 MG/1
4 TABLET ORAL
Status: DISCONTINUED | OUTPATIENT
Start: 2020-01-01 | End: 2020-01-01

## 2020-01-01 RX ORDER — LORAZEPAM 2 MG/ML
3 INJECTION INTRAMUSCULAR
Status: DISCONTINUED | OUTPATIENT
Start: 2020-01-01 | End: 2020-01-01

## 2020-01-01 RX ORDER — CHLORDIAZEPOXIDE HYDROCHLORIDE 5 MG/1
10 CAPSULE, GELATIN COATED ORAL 2 TIMES DAILY
Status: DISCONTINUED | OUTPATIENT
Start: 2020-01-01 | End: 2020-01-01

## 2020-01-01 RX ORDER — FENTANYL CITRATE 50 UG/ML
25 INJECTION, SOLUTION INTRAMUSCULAR; INTRAVENOUS EVERY 5 MIN PRN
Status: DISCONTINUED | OUTPATIENT
Start: 2020-01-01 | End: 2020-01-01 | Stop reason: HOSPADM

## 2020-01-01 RX ORDER — MIDAZOLAM HYDROCHLORIDE 1 MG/ML
INJECTION INTRAMUSCULAR; INTRAVENOUS PRN
Status: DISCONTINUED | OUTPATIENT
Start: 2020-01-01 | End: 2020-01-01 | Stop reason: SDUPTHER

## 2020-01-01 RX ORDER — LORAZEPAM 1 MG/1
1 TABLET ORAL
Status: DISCONTINUED | OUTPATIENT
Start: 2020-01-01 | End: 2020-01-01 | Stop reason: HOSPADM

## 2020-01-01 RX ORDER — ACETAMINOPHEN 325 MG/1
650 TABLET ORAL EVERY 4 HOURS PRN
Status: DISCONTINUED | OUTPATIENT
Start: 2020-01-01 | End: 2020-01-01 | Stop reason: HOSPADM

## 2020-01-01 RX ORDER — POLYETHYLENE GLYCOL 3350 17 G/17G
17 POWDER, FOR SOLUTION ORAL DAILY PRN
Status: DISCONTINUED | OUTPATIENT
Start: 2020-01-01 | End: 2020-01-01 | Stop reason: HOSPADM

## 2020-01-01 RX ORDER — METOPROLOL SUCCINATE 25 MG/1
25 TABLET, EXTENDED RELEASE ORAL DAILY
Status: DISCONTINUED | OUTPATIENT
Start: 2020-01-01 | End: 2020-01-01 | Stop reason: HOSPADM

## 2020-01-01 RX ORDER — OXYCODONE HYDROCHLORIDE AND ACETAMINOPHEN 5; 325 MG/1; MG/1
1 TABLET ORAL EVERY 6 HOURS PRN
Status: DISCONTINUED | OUTPATIENT
Start: 2020-01-01 | End: 2020-01-01 | Stop reason: HOSPADM

## 2020-01-01 RX ORDER — METOPROLOL SUCCINATE 50 MG/1
50 TABLET, EXTENDED RELEASE ORAL DAILY
Status: DISCONTINUED | OUTPATIENT
Start: 2020-01-01 | End: 2020-01-01

## 2020-01-01 RX ORDER — OXYCODONE HYDROCHLORIDE AND ACETAMINOPHEN 5; 325 MG/1; MG/1
1-2 TABLET ORAL EVERY 6 HOURS PRN
Qty: 40 TABLET | Refills: 0 | Status: SHIPPED | OUTPATIENT
Start: 2020-01-01 | End: 2020-01-01

## 2020-01-01 RX ORDER — LORAZEPAM 1 MG/1
2 TABLET ORAL
Status: DISCONTINUED | OUTPATIENT
Start: 2020-01-01 | End: 2020-01-01 | Stop reason: HOSPADM

## 2020-01-01 RX ORDER — PROMETHAZINE HYDROCHLORIDE 25 MG/ML
6.25 INJECTION, SOLUTION INTRAMUSCULAR; INTRAVENOUS
Status: DISCONTINUED | OUTPATIENT
Start: 2020-01-01 | End: 2020-01-01 | Stop reason: HOSPADM

## 2020-01-01 RX ORDER — FENTANYL CITRATE 50 UG/ML
INJECTION, SOLUTION INTRAMUSCULAR; INTRAVENOUS PRN
Status: DISCONTINUED | OUTPATIENT
Start: 2020-01-01 | End: 2020-01-01 | Stop reason: SDUPTHER

## 2020-01-01 RX ORDER — DOCUSATE SODIUM 100 MG/1
100 CAPSULE, LIQUID FILLED ORAL DAILY
Status: DISCONTINUED | OUTPATIENT
Start: 2020-01-01 | End: 2020-01-01 | Stop reason: HOSPADM

## 2020-01-01 RX ORDER — ASPIRIN 81 MG/1
81 TABLET ORAL 2 TIMES DAILY
Qty: 60 TABLET | Refills: 0 | Status: SHIPPED | OUTPATIENT
Start: 2020-01-01 | End: 2020-01-01

## 2020-01-01 RX ORDER — SODIUM CHLORIDE 0.9 % (FLUSH) 0.9 %
10 SYRINGE (ML) INJECTION EVERY 12 HOURS SCHEDULED
Status: DISCONTINUED | OUTPATIENT
Start: 2020-01-01 | End: 2020-01-01 | Stop reason: SDUPTHER

## 2020-01-01 RX ORDER — LORAZEPAM 2 MG/ML
2 INJECTION INTRAMUSCULAR
Status: DISCONTINUED | OUTPATIENT
Start: 2020-01-01 | End: 2020-01-01

## 2020-01-01 RX ORDER — LORAZEPAM 1 MG/1
3 TABLET ORAL
Status: DISCONTINUED | OUTPATIENT
Start: 2020-01-01 | End: 2020-01-01

## 2020-01-01 RX ORDER — SODIUM CHLORIDE 9 MG/ML
INJECTION, SOLUTION INTRAVENOUS CONTINUOUS
Status: DISCONTINUED | OUTPATIENT
Start: 2020-01-01 | End: 2020-01-01

## 2020-01-01 RX ORDER — SODIUM CHLORIDE 0.9 % (FLUSH) 0.9 %
10 SYRINGE (ML) INJECTION PRN
Status: DISCONTINUED | OUTPATIENT
Start: 2020-01-01 | End: 2020-01-01 | Stop reason: SDUPTHER

## 2020-01-01 RX ORDER — ONDANSETRON 2 MG/ML
4 INJECTION INTRAMUSCULAR; INTRAVENOUS EVERY 6 HOURS PRN
Status: DISCONTINUED | OUTPATIENT
Start: 2020-01-01 | End: 2020-01-01 | Stop reason: HOSPADM

## 2020-01-01 RX ORDER — CALCIUM CARBONATE/VITAMIN D3 500-10/5ML
400 LIQUID (ML) ORAL DAILY
Qty: 30 CAPSULE | Refills: 0 | Status: SHIPPED | OUTPATIENT
Start: 2020-01-01 | End: 2020-01-01 | Stop reason: SDUPTHER

## 2020-01-01 RX ORDER — DEXAMETHASONE SODIUM PHOSPHATE 4 MG/ML
INJECTION, SOLUTION INTRA-ARTICULAR; INTRALESIONAL; INTRAMUSCULAR; INTRAVENOUS; SOFT TISSUE PRN
Status: DISCONTINUED | OUTPATIENT
Start: 2020-01-01 | End: 2020-01-01 | Stop reason: SDUPTHER

## 2020-01-01 RX ORDER — HYDROCODONE BITARTRATE AND ACETAMINOPHEN 5; 325 MG/1; MG/1
2 TABLET ORAL PRN
Status: DISCONTINUED | OUTPATIENT
Start: 2020-01-01 | End: 2020-01-01 | Stop reason: HOSPADM

## 2020-01-01 RX ORDER — HYDROCODONE BITARTRATE AND ACETAMINOPHEN 5; 325 MG/1; MG/1
1 TABLET ORAL PRN
Status: DISCONTINUED | OUTPATIENT
Start: 2020-01-01 | End: 2020-01-01 | Stop reason: HOSPADM

## 2020-01-01 RX ORDER — ACETAMINOPHEN 650 MG/1
650 SUPPOSITORY RECTAL EVERY 6 HOURS PRN
Status: DISCONTINUED | OUTPATIENT
Start: 2020-01-01 | End: 2020-01-01 | Stop reason: HOSPADM

## 2020-01-01 RX ORDER — POTASSIUM CHLORIDE 20 MEQ/1
40 TABLET, EXTENDED RELEASE ORAL PRN
Status: DISCONTINUED | OUTPATIENT
Start: 2020-01-01 | End: 2020-01-01 | Stop reason: HOSPADM

## 2020-01-01 RX ORDER — 0.9 % SODIUM CHLORIDE 0.9 %
250 INTRAVENOUS SOLUTION INTRAVENOUS ONCE
Status: COMPLETED | OUTPATIENT
Start: 2020-01-01 | End: 2020-01-01

## 2020-01-01 RX ORDER — SODIUM CHLORIDE 0.9 % (FLUSH) 0.9 %
10 SYRINGE (ML) INJECTION PRN
Status: DISCONTINUED | OUTPATIENT
Start: 2020-01-01 | End: 2020-01-01 | Stop reason: HOSPADM

## 2020-01-01 RX ORDER — LANOLIN ALCOHOL/MO/W.PET/CERES
325 CREAM (GRAM) TOPICAL 2 TIMES DAILY WITH MEALS
Qty: 90 TABLET | Refills: 3 | Status: SHIPPED | OUTPATIENT
Start: 2020-01-01 | End: 2020-01-01 | Stop reason: SDUPTHER

## 2020-01-01 RX ORDER — SENNA AND DOCUSATE SODIUM 50; 8.6 MG/1; MG/1
1 TABLET, FILM COATED ORAL 2 TIMES DAILY
Status: DISCONTINUED | OUTPATIENT
Start: 2020-01-01 | End: 2020-01-01 | Stop reason: HOSPADM

## 2020-01-01 RX ORDER — MAGNESIUM SULFATE IN WATER 40 MG/ML
4 INJECTION, SOLUTION INTRAVENOUS ONCE
Status: COMPLETED | OUTPATIENT
Start: 2020-01-01 | End: 2020-01-01

## 2020-01-01 RX ORDER — CHLORDIAZEPOXIDE HYDROCHLORIDE 5 MG/1
10 CAPSULE, GELATIN COATED ORAL 3 TIMES DAILY
Status: DISCONTINUED | OUTPATIENT
Start: 2020-01-01 | End: 2020-01-01

## 2020-01-01 RX ORDER — FAMOTIDINE 20 MG/1
20 TABLET, FILM COATED ORAL 2 TIMES DAILY
Status: CANCELLED | OUTPATIENT
Start: 2020-01-01

## 2020-01-01 RX ORDER — ASPIRIN 81 MG/1
81 TABLET ORAL 2 TIMES DAILY
Qty: 28 TABLET | Refills: 0 | Status: SHIPPED | OUTPATIENT
Start: 2020-01-01 | End: 2020-01-01 | Stop reason: SDUPTHER

## 2020-01-01 RX ORDER — SODIUM CHLORIDE 0.9 % (FLUSH) 0.9 %
10 SYRINGE (ML) INJECTION EVERY 12 HOURS SCHEDULED
Status: DISCONTINUED | OUTPATIENT
Start: 2020-01-01 | End: 2020-01-01 | Stop reason: HOSPADM

## 2020-01-01 RX ORDER — MAGNESIUM SULFATE IN WATER 40 MG/ML
2 INJECTION, SOLUTION INTRAVENOUS ONCE
Status: COMPLETED | OUTPATIENT
Start: 2020-01-01 | End: 2020-01-01

## 2020-01-01 RX ORDER — 0.9 % SODIUM CHLORIDE 0.9 %
20 INTRAVENOUS SOLUTION INTRAVENOUS ONCE
Status: DISCONTINUED | OUTPATIENT
Start: 2020-01-01 | End: 2020-01-01 | Stop reason: HOSPADM

## 2020-01-01 RX ORDER — ACETAMINOPHEN 325 MG/1
650 TABLET ORAL EVERY 6 HOURS PRN
Status: DISCONTINUED | OUTPATIENT
Start: 2020-01-01 | End: 2020-01-01 | Stop reason: HOSPADM

## 2020-01-01 RX ORDER — POTASSIUM CHLORIDE 7.45 MG/ML
10 INJECTION INTRAVENOUS PRN
Status: DISCONTINUED | OUTPATIENT
Start: 2020-01-01 | End: 2020-01-01 | Stop reason: SDUPTHER

## 2020-01-01 RX ORDER — LANOLIN ALCOHOL/MO/W.PET/CERES
400 CREAM (GRAM) TOPICAL DAILY
Status: DISCONTINUED | OUTPATIENT
Start: 2020-01-01 | End: 2020-01-01 | Stop reason: HOSPADM

## 2020-01-01 RX ORDER — METHOCARBAMOL 750 MG/1
750 TABLET, FILM COATED ORAL 3 TIMES DAILY PRN
Status: DISCONTINUED | OUTPATIENT
Start: 2020-01-01 | End: 2020-01-01 | Stop reason: HOSPADM

## 2020-01-01 RX ORDER — LORAZEPAM 2 MG/ML
1 INJECTION INTRAMUSCULAR
Status: DISCONTINUED | OUTPATIENT
Start: 2020-01-01 | End: 2020-01-01

## 2020-01-01 RX ORDER — ONDANSETRON 2 MG/ML
4 INJECTION INTRAMUSCULAR; INTRAVENOUS
Status: DISCONTINUED | OUTPATIENT
Start: 2020-01-01 | End: 2020-01-01 | Stop reason: HOSPADM

## 2020-01-01 RX ORDER — POTASSIUM CHLORIDE 20 MEQ/1
40 TABLET, EXTENDED RELEASE ORAL PRN
Status: DISCONTINUED | OUTPATIENT
Start: 2020-01-01 | End: 2020-01-01 | Stop reason: SDUPTHER

## 2020-01-01 RX ORDER — THIAMINE MONONITRATE (VIT B1) 100 MG
100 TABLET ORAL DAILY
Status: DISCONTINUED | OUTPATIENT
Start: 2020-01-01 | End: 2020-01-01 | Stop reason: HOSPADM

## 2020-01-01 RX ORDER — FERROUS SULFATE TAB EC 324 MG (65 MG FE EQUIVALENT) 324 (65 FE) MG
324 TABLET DELAYED RESPONSE ORAL 2 TIMES DAILY WITH MEALS
Status: DISCONTINUED | OUTPATIENT
Start: 2020-01-01 | End: 2020-01-01 | Stop reason: HOSPADM

## 2020-01-01 RX ORDER — FENTANYL CITRATE 50 UG/ML
50 INJECTION, SOLUTION INTRAMUSCULAR; INTRAVENOUS ONCE
Status: COMPLETED | OUTPATIENT
Start: 2020-01-01 | End: 2020-01-01

## 2020-01-01 RX ORDER — SUCCINYLCHOLINE/SOD CL,ISO/PF 200MG/10ML
SYRINGE (ML) INTRAVENOUS PRN
Status: DISCONTINUED | OUTPATIENT
Start: 2020-01-01 | End: 2020-01-01 | Stop reason: SDUPTHER

## 2020-01-01 RX ORDER — SODIUM CHLORIDE 450 MG/100ML
INJECTION, SOLUTION INTRAVENOUS CONTINUOUS
Status: DISCONTINUED | OUTPATIENT
Start: 2020-01-01 | End: 2020-01-01

## 2020-01-01 RX ORDER — 0.9 % SODIUM CHLORIDE 0.9 %
2000 INTRAVENOUS SOLUTION INTRAVENOUS ONCE
Status: COMPLETED | OUTPATIENT
Start: 2020-01-01 | End: 2020-01-01

## 2020-01-01 RX ORDER — HYDRALAZINE HYDROCHLORIDE 20 MG/ML
5 INJECTION INTRAMUSCULAR; INTRAVENOUS EVERY 10 MIN PRN
Status: DISCONTINUED | OUTPATIENT
Start: 2020-01-01 | End: 2020-01-01 | Stop reason: HOSPADM

## 2020-01-01 RX ORDER — PROPOFOL 10 MG/ML
INJECTION, EMULSION INTRAVENOUS PRN
Status: DISCONTINUED | OUTPATIENT
Start: 2020-01-01 | End: 2020-01-01 | Stop reason: SDUPTHER

## 2020-01-01 RX ORDER — METHOCARBAMOL 750 MG/1
750 TABLET, FILM COATED ORAL 3 TIMES DAILY PRN
Qty: 20 TABLET | Refills: 0 | Status: SHIPPED | OUTPATIENT
Start: 2020-01-01 | End: 2020-01-01

## 2020-01-01 RX ORDER — ONDANSETRON 2 MG/ML
4 INJECTION INTRAMUSCULAR; INTRAVENOUS EVERY 6 HOURS PRN
Status: DISCONTINUED | OUTPATIENT
Start: 2020-01-01 | End: 2020-01-01 | Stop reason: SDUPTHER

## 2020-01-01 RX ORDER — OXYCODONE HYDROCHLORIDE AND ACETAMINOPHEN 5; 325 MG/1; MG/1
2 TABLET ORAL EVERY 6 HOURS PRN
Status: DISCONTINUED | OUTPATIENT
Start: 2020-01-01 | End: 2020-01-01 | Stop reason: HOSPADM

## 2020-01-01 RX ORDER — METOPROLOL SUCCINATE 25 MG/1
25 TABLET, EXTENDED RELEASE ORAL DAILY
Qty: 30 TABLET | Refills: 3 | Status: SHIPPED | OUTPATIENT
Start: 2020-01-01 | End: 2020-01-01 | Stop reason: SDUPTHER

## 2020-01-01 RX ORDER — NICOTINE 21 MG/24HR
1 PATCH, TRANSDERMAL 24 HOURS TRANSDERMAL DAILY
Status: DISCONTINUED | OUTPATIENT
Start: 2020-01-01 | End: 2020-01-01 | Stop reason: HOSPADM

## 2020-01-01 RX ORDER — LORAZEPAM 1 MG/1
1 TABLET ORAL
Status: DISCONTINUED | OUTPATIENT
Start: 2020-01-01 | End: 2020-01-01

## 2020-01-01 RX ORDER — PANTOPRAZOLE SODIUM 40 MG/1
40 TABLET, DELAYED RELEASE ORAL
Status: DISCONTINUED | OUTPATIENT
Start: 2020-01-01 | End: 2020-01-01 | Stop reason: HOSPADM

## 2020-01-01 RX ORDER — LIDOCAINE HYDROCHLORIDE 20 MG/ML
INJECTION, SOLUTION EPIDURAL; INFILTRATION; INTRACAUDAL; PERINEURAL PRN
Status: DISCONTINUED | OUTPATIENT
Start: 2020-01-01 | End: 2020-01-01 | Stop reason: SDUPTHER

## 2020-01-01 RX ORDER — LORAZEPAM 1 MG/1
TABLET ORAL
Status: DISPENSED
Start: 2020-01-01 | End: 2020-01-01

## 2020-01-01 RX ORDER — ALBUMIN, HUMAN INJ 5% 5 %
SOLUTION INTRAVENOUS PRN
Status: DISCONTINUED | OUTPATIENT
Start: 2020-01-01 | End: 2020-01-01 | Stop reason: SDUPTHER

## 2020-01-01 RX ORDER — FOLIC ACID 1 MG/1
1 TABLET ORAL DAILY
Status: DISCONTINUED | OUTPATIENT
Start: 2020-01-01 | End: 2020-01-01

## 2020-01-01 RX ORDER — TRAMADOL HYDROCHLORIDE 50 MG/1
50 TABLET ORAL EVERY 6 HOURS PRN
Status: DISCONTINUED | OUTPATIENT
Start: 2020-01-01 | End: 2020-01-01 | Stop reason: HOSPADM

## 2020-01-01 RX ORDER — ALBUTEROL SULFATE 90 UG/1
2 AEROSOL, METERED RESPIRATORY (INHALATION) 4 TIMES DAILY PRN
Status: DISCONTINUED | OUTPATIENT
Start: 2020-01-01 | End: 2020-01-01 | Stop reason: HOSPADM

## 2020-01-01 RX ORDER — ONDANSETRON 2 MG/ML
INJECTION INTRAMUSCULAR; INTRAVENOUS PRN
Status: DISCONTINUED | OUTPATIENT
Start: 2020-01-01 | End: 2020-01-01 | Stop reason: SDUPTHER

## 2020-01-01 RX ORDER — POTASSIUM CHLORIDE 7.45 MG/ML
10 INJECTION INTRAVENOUS PRN
Status: DISCONTINUED | OUTPATIENT
Start: 2020-01-01 | End: 2020-01-01 | Stop reason: HOSPADM

## 2020-01-01 RX ORDER — PROMETHAZINE HYDROCHLORIDE 25 MG/1
12.5 TABLET ORAL EVERY 6 HOURS PRN
Status: DISCONTINUED | OUTPATIENT
Start: 2020-01-01 | End: 2020-01-01 | Stop reason: HOSPADM

## 2020-01-01 RX ORDER — MEPERIDINE HYDROCHLORIDE 25 MG/ML
12.5 INJECTION INTRAMUSCULAR; INTRAVENOUS; SUBCUTANEOUS EVERY 5 MIN PRN
Status: DISCONTINUED | OUTPATIENT
Start: 2020-01-01 | End: 2020-01-01 | Stop reason: HOSPADM

## 2020-01-01 RX ORDER — BUPIVACAINE HYDROCHLORIDE 5 MG/ML
INJECTION, SOLUTION EPIDURAL; INTRACAUDAL
Status: COMPLETED | OUTPATIENT
Start: 2020-01-01 | End: 2020-01-01

## 2020-01-01 RX ORDER — DOCUSATE SODIUM 100 MG/1
100 CAPSULE, LIQUID FILLED ORAL 2 TIMES DAILY
Status: DISCONTINUED | OUTPATIENT
Start: 2020-01-01 | End: 2020-01-01 | Stop reason: HOSPADM

## 2020-01-01 RX ADMIN — MAGNESIUM HYDROXIDE 30 ML: 400 SUSPENSION ORAL at 16:55

## 2020-01-01 RX ADMIN — ENOXAPARIN SODIUM 40 MG: 40 INJECTION SUBCUTANEOUS at 08:07

## 2020-01-01 RX ADMIN — ENOXAPARIN SODIUM 40 MG: 40 INJECTION SUBCUTANEOUS at 09:08

## 2020-01-01 RX ADMIN — FERROUS SULFATE TAB EC 324 MG (65 MG FE EQUIVALENT) 324 MG: 324 (65 FE) TABLET DELAYED RESPONSE at 08:55

## 2020-01-01 RX ADMIN — CHLORDIAZEPOXIDE HYDROCHLORIDE 10 MG: 5 CAPSULE ORAL at 13:45

## 2020-01-01 RX ADMIN — MAGNESIUM SULFATE HEPTAHYDRATE 2 G: 40 INJECTION, SOLUTION INTRAVENOUS at 12:13

## 2020-01-01 RX ADMIN — THERA TABS 1 TABLET: TAB at 08:04

## 2020-01-01 RX ADMIN — SODIUM CHLORIDE: 9 INJECTION, SOLUTION INTRAVENOUS at 12:18

## 2020-01-01 RX ADMIN — POTASSIUM CHLORIDE 40 MEQ: 1500 TABLET, EXTENDED RELEASE ORAL at 09:52

## 2020-01-01 RX ADMIN — ENOXAPARIN SODIUM 40 MG: 40 INJECTION SUBCUTANEOUS at 08:35

## 2020-01-01 RX ADMIN — FOLIC ACID 1 MG: 1 TABLET ORAL at 09:07

## 2020-01-01 RX ADMIN — SODIUM CHLORIDE: 9 INJECTION, SOLUTION INTRAVENOUS at 05:24

## 2020-01-01 RX ADMIN — MAGNESIUM SULFATE HEPTAHYDRATE 4 G: 40 INJECTION, SOLUTION INTRAVENOUS at 08:29

## 2020-01-01 RX ADMIN — SODIUM CHLORIDE: 9 INJECTION, SOLUTION INTRAVENOUS at 03:36

## 2020-01-01 RX ADMIN — IRON SUCROSE 200 MG: 20 INJECTION, SOLUTION INTRAVENOUS at 09:07

## 2020-01-01 RX ADMIN — LORAZEPAM 2 MG: 1 TABLET ORAL at 23:44

## 2020-01-01 RX ADMIN — CHLORDIAZEPOXIDE HYDROCHLORIDE 10 MG: 5 CAPSULE ORAL at 21:05

## 2020-01-01 RX ADMIN — Medication 100 MG: at 09:22

## 2020-01-01 RX ADMIN — LORAZEPAM 1 MG: 2 INJECTION INTRAMUSCULAR; INTRAVENOUS at 09:32

## 2020-01-01 RX ADMIN — CHLORDIAZEPOXIDE HYDROCHLORIDE 10 MG: 5 CAPSULE ORAL at 22:21

## 2020-01-01 RX ADMIN — Medication: at 03:30

## 2020-01-01 RX ADMIN — OXYCODONE HYDROCHLORIDE AND ACETAMINOPHEN 2 TABLET: 5; 325 TABLET ORAL at 04:28

## 2020-01-01 RX ADMIN — SODIUM CHLORIDE, PRESERVATIVE FREE 10 ML: 5 INJECTION INTRAVENOUS at 09:08

## 2020-01-01 RX ADMIN — FOLIC ACID 1 MG: 1 TABLET ORAL at 08:54

## 2020-01-01 RX ADMIN — OXYCODONE HYDROCHLORIDE AND ACETAMINOPHEN 1 TABLET: 5; 325 TABLET ORAL at 16:55

## 2020-01-01 RX ADMIN — KETOROLAC TROMETHAMINE 15 MG: 15 INJECTION, SOLUTION INTRAMUSCULAR; INTRAVENOUS at 19:20

## 2020-01-01 RX ADMIN — FOLIC ACID: 5 INJECTION, SOLUTION INTRAMUSCULAR; INTRAVENOUS; SUBCUTANEOUS at 12:23

## 2020-01-01 RX ADMIN — SENNOSIDES AND DOCUSATE SODIUM 1 TABLET: 8.6; 5 TABLET ORAL at 09:07

## 2020-01-01 RX ADMIN — Medication 100 MG: at 08:37

## 2020-01-01 RX ADMIN — CEFAZOLIN 2 G: 1 INJECTION, POWDER, FOR SOLUTION INTRAMUSCULAR; INTRAVENOUS at 21:00

## 2020-01-01 RX ADMIN — LORAZEPAM 1 MG: 1 TABLET ORAL at 02:43

## 2020-01-01 RX ADMIN — Medication 400 MG: at 08:53

## 2020-01-01 RX ADMIN — SENNOSIDES AND DOCUSATE SODIUM 1 TABLET: 8.6; 5 TABLET ORAL at 22:21

## 2020-01-01 RX ADMIN — DOCUSATE SODIUM 100 MG: 100 CAPSULE, LIQUID FILLED ORAL at 08:04

## 2020-01-01 RX ADMIN — OXYCODONE HYDROCHLORIDE AND ACETAMINOPHEN 2 TABLET: 5; 325 TABLET ORAL at 01:13

## 2020-01-01 RX ADMIN — KETOROLAC TROMETHAMINE 15 MG: 15 INJECTION, SOLUTION INTRAMUSCULAR; INTRAVENOUS at 08:08

## 2020-01-01 RX ADMIN — THERA TABS 1 TABLET: TAB at 08:37

## 2020-01-01 RX ADMIN — FERROUS SULFATE TAB EC 324 MG (65 MG FE EQUIVALENT) 324 MG: 324 (65 FE) TABLET DELAYED RESPONSE at 16:49

## 2020-01-01 RX ADMIN — OYSTER SHELL CALCIUM WITH VITAMIN D 1 TABLET: 500; 200 TABLET, FILM COATED ORAL at 21:18

## 2020-01-01 RX ADMIN — SODIUM CHLORIDE, PRESERVATIVE FREE 10 ML: 5 INJECTION INTRAVENOUS at 21:00

## 2020-01-01 RX ADMIN — SODIUM CHLORIDE, PRESERVATIVE FREE 10 ML: 5 INJECTION INTRAVENOUS at 08:55

## 2020-01-01 RX ADMIN — TRAMADOL HYDROCHLORIDE 50 MG: 50 TABLET, FILM COATED ORAL at 21:19

## 2020-01-01 RX ADMIN — KETOROLAC TROMETHAMINE 15 MG: 15 INJECTION, SOLUTION INTRAMUSCULAR; INTRAVENOUS at 04:33

## 2020-01-01 RX ADMIN — LORAZEPAM 1 MG: 1 TABLET ORAL at 09:22

## 2020-01-01 RX ADMIN — DOCUSATE SODIUM 100 MG: 100 CAPSULE, LIQUID FILLED ORAL at 08:34

## 2020-01-01 RX ADMIN — PANTOPRAZOLE SODIUM 40 MG: 40 TABLET, DELAYED RELEASE ORAL at 05:23

## 2020-01-01 RX ADMIN — ROCURONIUM BROMIDE 5 MG: 10 INJECTION INTRAVENOUS at 12:23

## 2020-01-01 RX ADMIN — SODIUM CHLORIDE, PRESERVATIVE FREE 10 ML: 5 INJECTION INTRAVENOUS at 21:10

## 2020-01-01 RX ADMIN — SODIUM CHLORIDE: 9 INJECTION, SOLUTION INTRAVENOUS at 18:40

## 2020-01-01 RX ADMIN — ACETAMINOPHEN 650 MG: 325 TABLET, FILM COATED ORAL at 23:47

## 2020-01-01 RX ADMIN — THERA TABS 1 TABLET: TAB at 09:07

## 2020-01-01 RX ADMIN — CEFAZOLIN 2 G: 1 INJECTION, POWDER, FOR SOLUTION INTRAMUSCULAR; INTRAVENOUS at 04:27

## 2020-01-01 RX ADMIN — Medication 100 MG: at 09:07

## 2020-01-01 RX ADMIN — Medication 10 MEQ: at 15:55

## 2020-01-01 RX ADMIN — OXYCODONE HYDROCHLORIDE AND ACETAMINOPHEN 2 TABLET: 5; 325 TABLET ORAL at 15:54

## 2020-01-01 RX ADMIN — SODIUM CHLORIDE, PRESERVATIVE FREE 10 ML: 5 INJECTION INTRAVENOUS at 08:34

## 2020-01-01 RX ADMIN — ACETAMINOPHEN 650 MG: 325 TABLET, FILM COATED ORAL at 05:24

## 2020-01-01 RX ADMIN — Medication 100 MG: at 08:53

## 2020-01-01 RX ADMIN — OXYCODONE HYDROCHLORIDE AND ACETAMINOPHEN 2 TABLET: 5; 325 TABLET ORAL at 15:43

## 2020-01-01 RX ADMIN — CEFAZOLIN 2 G: 10 INJECTION, POWDER, FOR SOLUTION INTRAVENOUS at 12:19

## 2020-01-01 RX ADMIN — FOLIC ACID 1 MG: 1 TABLET ORAL at 17:09

## 2020-01-01 RX ADMIN — PANTOPRAZOLE SODIUM 40 MG: 40 TABLET, DELAYED RELEASE ORAL at 17:09

## 2020-01-01 RX ADMIN — SODIUM CHLORIDE 20 ML: 9 INJECTION, SOLUTION INTRAVENOUS at 11:18

## 2020-01-01 RX ADMIN — FOLIC ACID 1 MG: 1 TABLET ORAL at 08:53

## 2020-01-01 RX ADMIN — LORAZEPAM 2 MG: 1 TABLET ORAL at 20:35

## 2020-01-01 RX ADMIN — OXYCODONE HYDROCHLORIDE AND ACETAMINOPHEN 2 TABLET: 5; 325 TABLET ORAL at 08:37

## 2020-01-01 RX ADMIN — OYSTER SHELL CALCIUM WITH VITAMIN D 1 TABLET: 500; 200 TABLET, FILM COATED ORAL at 08:34

## 2020-01-01 RX ADMIN — MAGNESIUM SULFATE HEPTAHYDRATE 2 G: 40 INJECTION, SOLUTION INTRAVENOUS at 17:39

## 2020-01-01 RX ADMIN — FOLIC ACID 1 MG: 1 TABLET ORAL at 09:22

## 2020-01-01 RX ADMIN — Medication 10 MEQ: at 22:35

## 2020-01-01 RX ADMIN — METOPROLOL SUCCINATE 25 MG: 25 TABLET, EXTENDED RELEASE ORAL at 08:57

## 2020-01-01 RX ADMIN — FENTANYL CITRATE 25 MCG: 50 INJECTION INTRAMUSCULAR; INTRAVENOUS at 14:00

## 2020-01-01 RX ADMIN — PANTOPRAZOLE SODIUM 40 MG: 40 TABLET, DELAYED RELEASE ORAL at 05:15

## 2020-01-01 RX ADMIN — Medication 10 MEQ: at 16:55

## 2020-01-01 RX ADMIN — FENTANYL CITRATE 50 MCG: 50 INJECTION INTRAMUSCULAR; INTRAVENOUS at 13:01

## 2020-01-01 RX ADMIN — HYDROMORPHONE HYDROCHLORIDE 0.5 MG: 1 INJECTION, SOLUTION INTRAMUSCULAR; INTRAVENOUS; SUBCUTANEOUS at 12:52

## 2020-01-01 RX ADMIN — MIDAZOLAM 1 MG: 1 INJECTION INTRAMUSCULAR; INTRAVENOUS at 12:16

## 2020-01-01 RX ADMIN — FOLIC ACID 1 MG: 1 TABLET ORAL at 08:36

## 2020-01-01 RX ADMIN — PHENYLEPHRINE HYDROCHLORIDE 200 MCG: 10 INJECTION INTRAVENOUS at 14:07

## 2020-01-01 RX ADMIN — OXYCODONE HYDROCHLORIDE AND ACETAMINOPHEN 2 TABLET: 5; 325 TABLET ORAL at 21:11

## 2020-01-01 RX ADMIN — LORAZEPAM 1 MG: 1 TABLET ORAL at 17:09

## 2020-01-01 RX ADMIN — LORAZEPAM 3 MG: 1 TABLET ORAL at 00:06

## 2020-01-01 RX ADMIN — Medication 160 MG: at 12:24

## 2020-01-01 RX ADMIN — Medication: at 17:09

## 2020-01-01 RX ADMIN — METOPROLOL SUCCINATE 50 MG: 50 TABLET, EXTENDED RELEASE ORAL at 08:04

## 2020-01-01 RX ADMIN — PANTOPRAZOLE SODIUM 40 MG: 40 TABLET, DELAYED RELEASE ORAL at 07:00

## 2020-01-01 RX ADMIN — Medication 100 MG: at 08:55

## 2020-01-01 RX ADMIN — Medication 100 MG: at 17:09

## 2020-01-01 RX ADMIN — SENNOSIDES AND DOCUSATE SODIUM 1 TABLET: 8.6; 5 TABLET ORAL at 08:36

## 2020-01-01 RX ADMIN — DEXAMETHASONE SODIUM PHOSPHATE 8 MG: 4 INJECTION, SOLUTION INTRAMUSCULAR; INTRAVENOUS at 12:30

## 2020-01-01 RX ADMIN — Medication 10 MEQ: at 19:20

## 2020-01-01 RX ADMIN — OYSTER SHELL CALCIUM WITH VITAMIN D 1 TABLET: 500; 200 TABLET, FILM COATED ORAL at 22:21

## 2020-01-01 RX ADMIN — OYSTER SHELL CALCIUM WITH VITAMIN D 1 TABLET: 500; 200 TABLET, FILM COATED ORAL at 21:08

## 2020-01-01 RX ADMIN — DOCUSATE SODIUM 100 MG: 100 CAPSULE, LIQUID FILLED ORAL at 08:54

## 2020-01-01 RX ADMIN — CHLORDIAZEPOXIDE HYDROCHLORIDE 10 MG: 5 CAPSULE ORAL at 21:00

## 2020-01-01 RX ADMIN — Medication 400 MG: at 08:55

## 2020-01-01 RX ADMIN — METOPROLOL SUCCINATE 50 MG: 50 TABLET, EXTENDED RELEASE ORAL at 17:09

## 2020-01-01 RX ADMIN — Medication 100 MG: at 08:54

## 2020-01-01 RX ADMIN — CHLORDIAZEPOXIDE HYDROCHLORIDE 10 MG: 5 CAPSULE ORAL at 16:23

## 2020-01-01 RX ADMIN — ONDANSETRON 4 MG: 2 INJECTION INTRAMUSCULAR; INTRAVENOUS at 12:16

## 2020-01-01 RX ADMIN — SODIUM CHLORIDE, PRESERVATIVE FREE 10 ML: 5 INJECTION INTRAVENOUS at 21:01

## 2020-01-01 RX ADMIN — PANTOPRAZOLE SODIUM 40 MG: 40 TABLET, DELAYED RELEASE ORAL at 05:38

## 2020-01-01 RX ADMIN — FERROUS SULFATE TAB EC 324 MG (65 MG FE EQUIVALENT) 324 MG: 324 (65 FE) TABLET DELAYED RESPONSE at 18:40

## 2020-01-01 RX ADMIN — DOCUSATE SODIUM 100 MG: 100 CAPSULE, LIQUID FILLED ORAL at 22:21

## 2020-01-01 RX ADMIN — FERROUS SULFATE TAB EC 324 MG (65 MG FE EQUIVALENT) 324 MG: 324 (65 FE) TABLET DELAYED RESPONSE at 08:54

## 2020-01-01 RX ADMIN — DOCUSATE SODIUM 100 MG: 100 CAPSULE, LIQUID FILLED ORAL at 08:36

## 2020-01-01 RX ADMIN — KETOROLAC TROMETHAMINE 15 MG: 15 INJECTION, SOLUTION INTRAMUSCULAR; INTRAVENOUS at 17:09

## 2020-01-01 RX ADMIN — SENNOSIDES AND DOCUSATE SODIUM 1 TABLET: 8.6; 5 TABLET ORAL at 21:05

## 2020-01-01 RX ADMIN — OYSTER SHELL CALCIUM WITH VITAMIN D 1 TABLET: 500; 200 TABLET, FILM COATED ORAL at 20:22

## 2020-01-01 RX ADMIN — OYSTER SHELL CALCIUM WITH VITAMIN D 1 TABLET: 500; 200 TABLET, FILM COATED ORAL at 08:37

## 2020-01-01 RX ADMIN — LIDOCAINE HYDROCHLORIDE 60 MG: 20 INJECTION, SOLUTION EPIDURAL; INFILTRATION; INTRACAUDAL; PERINEURAL at 12:23

## 2020-01-01 RX ADMIN — Medication 10 MEQ: at 20:36

## 2020-01-01 RX ADMIN — FENTANYL CITRATE 100 MCG: 50 INJECTION INTRAMUSCULAR; INTRAVENOUS at 12:23

## 2020-01-01 RX ADMIN — PHENYLEPHRINE HYDROCHLORIDE 200 MCG: 10 INJECTION INTRAVENOUS at 13:55

## 2020-01-01 RX ADMIN — PANTOPRAZOLE SODIUM 40 MG: 40 TABLET, DELAYED RELEASE ORAL at 06:29

## 2020-01-01 RX ADMIN — FOLIC ACID 1 MG: 1 TABLET ORAL at 08:05

## 2020-01-01 RX ADMIN — DOCUSATE SODIUM 100 MG: 100 CAPSULE, LIQUID FILLED ORAL at 20:59

## 2020-01-01 RX ADMIN — ALBUMIN (HUMAN) 250 ML: 12.5 SOLUTION INTRAVENOUS at 13:55

## 2020-01-01 RX ADMIN — IRON SUCROSE 200 MG: 20 INJECTION, SOLUTION INTRAVENOUS at 16:55

## 2020-01-01 RX ADMIN — FERROUS SULFATE TAB EC 324 MG (65 MG FE EQUIVALENT) 324 MG: 324 (65 FE) TABLET DELAYED RESPONSE at 17:16

## 2020-01-01 RX ADMIN — SODIUM CHLORIDE, PRESERVATIVE FREE 10 ML: 5 INJECTION INTRAVENOUS at 22:22

## 2020-01-01 RX ADMIN — DOCUSATE SODIUM 100 MG: 100 CAPSULE, LIQUID FILLED ORAL at 08:55

## 2020-01-01 RX ADMIN — METOPROLOL SUCCINATE 50 MG: 50 TABLET, EXTENDED RELEASE ORAL at 09:22

## 2020-01-01 RX ADMIN — CHLORDIAZEPOXIDE HYDROCHLORIDE 10 MG: 5 CAPSULE ORAL at 08:04

## 2020-01-01 RX ADMIN — ACETAMINOPHEN 650 MG: 325 TABLET, FILM COATED ORAL at 09:54

## 2020-01-01 RX ADMIN — OYSTER SHELL CALCIUM WITH VITAMIN D 1 TABLET: 500; 200 TABLET, FILM COATED ORAL at 08:53

## 2020-01-01 RX ADMIN — Medication 400 MG: at 08:54

## 2020-01-01 RX ADMIN — PANTOPRAZOLE SODIUM 40 MG: 40 TABLET, DELAYED RELEASE ORAL at 05:32

## 2020-01-01 RX ADMIN — CHLORDIAZEPOXIDE HYDROCHLORIDE 10 MG: 5 CAPSULE ORAL at 08:37

## 2020-01-01 RX ADMIN — SODIUM CHLORIDE 2000 ML: 9 INJECTION, SOLUTION INTRAVENOUS at 12:21

## 2020-01-01 RX ADMIN — Medication 100 MG: at 08:34

## 2020-01-01 RX ADMIN — PANTOPRAZOLE SODIUM 40 MG: 40 TABLET, DELAYED RELEASE ORAL at 06:02

## 2020-01-01 RX ADMIN — SENNOSIDES AND DOCUSATE SODIUM 1 TABLET: 8.6; 5 TABLET ORAL at 20:59

## 2020-01-01 RX ADMIN — FOLIC ACID 1 MG: 1 TABLET ORAL at 08:34

## 2020-01-01 RX ADMIN — METOPROLOL SUCCINATE 25 MG: 25 TABLET, EXTENDED RELEASE ORAL at 09:07

## 2020-01-01 RX ADMIN — DOCUSATE SODIUM 100 MG: 100 CAPSULE, LIQUID FILLED ORAL at 21:05

## 2020-01-01 RX ADMIN — CHLORDIAZEPOXIDE HYDROCHLORIDE 10 MG: 5 CAPSULE ORAL at 09:07

## 2020-01-01 RX ADMIN — SENNOSIDES AND DOCUSATE SODIUM 1 TABLET: 8.6; 5 TABLET ORAL at 08:04

## 2020-01-01 RX ADMIN — FOLIC ACID 1 MG: 1 TABLET ORAL at 08:55

## 2020-01-01 RX ADMIN — METOPROLOL SUCCINATE 50 MG: 50 TABLET, EXTENDED RELEASE ORAL at 08:24

## 2020-01-01 RX ADMIN — HYDROMORPHONE HYDROCHLORIDE 0.5 MG: 1 INJECTION, SOLUTION INTRAMUSCULAR; INTRAVENOUS; SUBCUTANEOUS at 12:49

## 2020-01-01 RX ADMIN — SODIUM CHLORIDE, PRESERVATIVE FREE 10 ML: 5 INJECTION INTRAVENOUS at 20:23

## 2020-01-01 RX ADMIN — ENOXAPARIN SODIUM 40 MG: 40 INJECTION SUBCUTANEOUS at 20:21

## 2020-01-01 RX ADMIN — PANTOPRAZOLE SODIUM 40 MG: 40 TABLET, DELAYED RELEASE ORAL at 06:53

## 2020-01-01 RX ADMIN — FERROUS SULFATE TAB EC 324 MG (65 MG FE EQUIVALENT) 324 MG: 324 (65 FE) TABLET DELAYED RESPONSE at 08:53

## 2020-01-01 RX ADMIN — OYSTER SHELL CALCIUM WITH VITAMIN D 1 TABLET: 500; 200 TABLET, FILM COATED ORAL at 20:36

## 2020-01-01 RX ADMIN — FERROUS SULFATE TAB EC 324 MG (65 MG FE EQUIVALENT) 324 MG: 324 (65 FE) TABLET DELAYED RESPONSE at 17:40

## 2020-01-01 RX ADMIN — ACETAMINOPHEN 650 MG: 325 TABLET ORAL at 05:31

## 2020-01-01 RX ADMIN — METOPROLOL SUCCINATE 25 MG: 25 TABLET, EXTENDED RELEASE ORAL at 08:55

## 2020-01-01 RX ADMIN — Medication 100 MG: at 08:05

## 2020-01-01 RX ADMIN — LORAZEPAM 2 MG: 2 INJECTION INTRAMUSCULAR; INTRAVENOUS at 20:22

## 2020-01-01 RX ADMIN — KETOROLAC TROMETHAMINE 15 MG: 15 INJECTION, SOLUTION INTRAMUSCULAR; INTRAVENOUS at 01:33

## 2020-01-01 RX ADMIN — OYSTER SHELL CALCIUM WITH VITAMIN D 1 TABLET: 500; 200 TABLET, FILM COATED ORAL at 09:07

## 2020-01-01 RX ADMIN — FENTANYL CITRATE 50 MCG: 0.05 INJECTION, SOLUTION INTRAMUSCULAR; INTRAVENOUS at 12:23

## 2020-01-01 RX ADMIN — Medication 400 MG: at 08:34

## 2020-01-01 RX ADMIN — HYDROMORPHONE HYDROCHLORIDE 0.5 MG: 1 INJECTION, SOLUTION INTRAMUSCULAR; INTRAVENOUS; SUBCUTANEOUS at 13:47

## 2020-01-01 RX ADMIN — OYSTER SHELL CALCIUM WITH VITAMIN D 1 TABLET: 500; 200 TABLET, FILM COATED ORAL at 21:05

## 2020-01-01 RX ADMIN — METOPROLOL SUCCINATE 25 MG: 25 TABLET, EXTENDED RELEASE ORAL at 08:34

## 2020-01-01 RX ADMIN — KETOROLAC TROMETHAMINE 15 MG: 15 INJECTION, SOLUTION INTRAMUSCULAR; INTRAVENOUS at 09:22

## 2020-01-01 RX ADMIN — IOPAMIDOL 75 ML: 755 INJECTION, SOLUTION INTRAVENOUS at 15:27

## 2020-01-01 RX ADMIN — FERROUS SULFATE TAB EC 324 MG (65 MG FE EQUIVALENT) 324 MG: 324 (65 FE) TABLET DELAYED RESPONSE at 08:34

## 2020-01-01 RX ADMIN — OYSTER SHELL CALCIUM WITH VITAMIN D 1 TABLET: 500; 200 TABLET, FILM COATED ORAL at 08:55

## 2020-01-01 RX ADMIN — FENTANYL CITRATE 25 MCG: 50 INJECTION INTRAMUSCULAR; INTRAVENOUS at 14:14

## 2020-01-01 RX ADMIN — SODIUM CHLORIDE 20 ML: 9 INJECTION, SOLUTION INTRAVENOUS at 22:20

## 2020-01-01 RX ADMIN — DOCUSATE SODIUM 100 MG: 100 CAPSULE, LIQUID FILLED ORAL at 09:07

## 2020-01-01 RX ADMIN — CHLORDIAZEPOXIDE HYDROCHLORIDE 10 MG: 5 CAPSULE ORAL at 08:25

## 2020-01-01 RX ADMIN — SODIUM CHLORIDE 250 ML: 9 INJECTION, SOLUTION INTRAVENOUS at 14:03

## 2020-01-01 RX ADMIN — LORAZEPAM 1 MG: 2 INJECTION INTRAMUSCULAR; INTRAVENOUS at 21:09

## 2020-01-01 RX ADMIN — SODIUM CHLORIDE: 9 INJECTION, SOLUTION INTRAVENOUS at 00:15

## 2020-01-01 RX ADMIN — Medication 10 MEQ: at 18:00

## 2020-01-01 RX ADMIN — SODIUM CHLORIDE, PRESERVATIVE FREE 10 ML: 5 INJECTION INTRAVENOUS at 09:22

## 2020-01-01 RX ADMIN — PANTOPRAZOLE SODIUM 40 MG: 40 TABLET, DELAYED RELEASE ORAL at 05:00

## 2020-01-01 RX ADMIN — OYSTER SHELL CALCIUM WITH VITAMIN D 1 TABLET: 500; 200 TABLET, FILM COATED ORAL at 08:54

## 2020-01-01 RX ADMIN — MIDAZOLAM 1 MG: 1 INJECTION INTRAMUSCULAR; INTRAVENOUS at 12:53

## 2020-01-01 RX ADMIN — PROPOFOL 150 MG: 10 INJECTION, EMULSION INTRAVENOUS at 12:23

## 2020-01-01 RX ADMIN — OXYCODONE HYDROCHLORIDE AND ACETAMINOPHEN 2 TABLET: 5; 325 TABLET ORAL at 09:27

## 2020-01-01 RX ADMIN — DOCUSATE SODIUM 100 MG: 100 CAPSULE, LIQUID FILLED ORAL at 08:53

## 2020-01-01 RX ADMIN — SODIUM CHLORIDE: 4.5 INJECTION, SOLUTION INTRAVENOUS at 17:27

## 2020-01-01 ASSESSMENT — PULMONARY FUNCTION TESTS
PIF_VALUE: 11
PIF_VALUE: 12
PIF_VALUE: 14
PIF_VALUE: 12
PIF_VALUE: 12
PIF_VALUE: 0
PIF_VALUE: 12
PIF_VALUE: 14
PIF_VALUE: 12
PIF_VALUE: 1
PIF_VALUE: 12
PIF_VALUE: 1
PIF_VALUE: 12
PIF_VALUE: 13
PIF_VALUE: 12
PIF_VALUE: 11
PIF_VALUE: 23
PIF_VALUE: 4
PIF_VALUE: 12
PIF_VALUE: 0
PIF_VALUE: 12
PIF_VALUE: 12
PIF_VALUE: 15
PIF_VALUE: 11
PIF_VALUE: 13
PIF_VALUE: 16
PIF_VALUE: 15
PIF_VALUE: 12
PIF_VALUE: 5
PIF_VALUE: 12
PIF_VALUE: 16
PIF_VALUE: 12
PIF_VALUE: 12
PIF_VALUE: 11
PIF_VALUE: 11
PIF_VALUE: 3
PIF_VALUE: 12
PIF_VALUE: 14
PIF_VALUE: 3
PIF_VALUE: 11
PIF_VALUE: 13
PIF_VALUE: 12
PIF_VALUE: 11
PIF_VALUE: 13
PIF_VALUE: 12
PIF_VALUE: 15
PIF_VALUE: 14
PIF_VALUE: 12
PIF_VALUE: 12
PIF_VALUE: 15
PIF_VALUE: 12
PIF_VALUE: 17
PIF_VALUE: 12
PIF_VALUE: 12
PIF_VALUE: 3
PIF_VALUE: 5
PIF_VALUE: 10
PIF_VALUE: 11
PIF_VALUE: 12
PIF_VALUE: 13
PIF_VALUE: 15
PIF_VALUE: 15
PIF_VALUE: 12
PIF_VALUE: 12
PIF_VALUE: 14
PIF_VALUE: 12
PIF_VALUE: 21
PIF_VALUE: 12
PIF_VALUE: 12
PIF_VALUE: 0
PIF_VALUE: 14
PIF_VALUE: 14
PIF_VALUE: 11
PIF_VALUE: 11
PIF_VALUE: 12
PIF_VALUE: 14
PIF_VALUE: 12
PIF_VALUE: 12
PIF_VALUE: 1
PIF_VALUE: 12
PIF_VALUE: 3
PIF_VALUE: 13
PIF_VALUE: 11
PIF_VALUE: 12
PIF_VALUE: 7
PIF_VALUE: 12
PIF_VALUE: 11
PIF_VALUE: 16
PIF_VALUE: 12
PIF_VALUE: 14
PIF_VALUE: 0
PIF_VALUE: 14
PIF_VALUE: 12
PIF_VALUE: 12
PIF_VALUE: 14
PIF_VALUE: 15
PIF_VALUE: 13
PIF_VALUE: 12
PIF_VALUE: 14
PIF_VALUE: 12

## 2020-01-01 ASSESSMENT — PAIN DESCRIPTION - DESCRIPTORS
DESCRIPTORS: ACHING
DESCRIPTORS: DISCOMFORT
DESCRIPTORS: DISCOMFORT
DESCRIPTORS: ACHING
DESCRIPTORS: DISCOMFORT
DESCRIPTORS: HEADACHE
DESCRIPTORS: ACHING;THROBBING;SHOOTING;SHARP
DESCRIPTORS: ACHING
DESCRIPTORS: DISCOMFORT
DESCRIPTORS: DISCOMFORT
DESCRIPTORS: ACHING
DESCRIPTORS: ACHING
DESCRIPTORS: DISCOMFORT
DESCRIPTORS: ACHING
DESCRIPTORS: ACHING;CONSTANT
DESCRIPTORS: ACHING
DESCRIPTORS: ACHING
DESCRIPTORS: DISCOMFORT
DESCRIPTORS: ACHING
DESCRIPTORS: DISCOMFORT
DESCRIPTORS: ACHING
DESCRIPTORS: ACHING;CONSTANT
DESCRIPTORS: HEADACHE
DESCRIPTORS: ACHING
DESCRIPTORS: ACHING;CONSTANT
DESCRIPTORS: ACHING

## 2020-01-01 ASSESSMENT — PAIN - FUNCTIONAL ASSESSMENT
PAIN_FUNCTIONAL_ASSESSMENT: INTOLERABLE, UNABLE TO DO ANY ACTIVE OR PASSIVE ACTIVITIES
PAIN_FUNCTIONAL_ASSESSMENT: PREVENTS OR INTERFERES SOME ACTIVE ACTIVITIES AND ADLS
PAIN_FUNCTIONAL_ASSESSMENT: PREVENTS OR INTERFERES SOME ACTIVE ACTIVITIES AND ADLS
PAIN_FUNCTIONAL_ASSESSMENT: INTOLERABLE, UNABLE TO DO ANY ACTIVE OR PASSIVE ACTIVITIES
PAIN_FUNCTIONAL_ASSESSMENT: ACTIVITIES ARE NOT PREVENTED
PAIN_FUNCTIONAL_ASSESSMENT: PREVENTS OR INTERFERES SOME ACTIVE ACTIVITIES AND ADLS
PAIN_FUNCTIONAL_ASSESSMENT: PREVENTS OR INTERFERES SOME ACTIVE ACTIVITIES AND ADLS
PAIN_FUNCTIONAL_ASSESSMENT: INTOLERABLE, UNABLE TO DO ANY ACTIVE OR PASSIVE ACTIVITIES
PAIN_FUNCTIONAL_ASSESSMENT: ACTIVITIES ARE NOT PREVENTED
PAIN_FUNCTIONAL_ASSESSMENT: PREVENTS OR INTERFERES SOME ACTIVE ACTIVITIES AND ADLS
PAIN_FUNCTIONAL_ASSESSMENT: INTOLERABLE, UNABLE TO DO ANY ACTIVE OR PASSIVE ACTIVITIES
PAIN_FUNCTIONAL_ASSESSMENT: PREVENTS OR INTERFERES SOME ACTIVE ACTIVITIES AND ADLS
PAIN_FUNCTIONAL_ASSESSMENT: 0-10
PAIN_FUNCTIONAL_ASSESSMENT: PREVENTS OR INTERFERES SOME ACTIVE ACTIVITIES AND ADLS
PAIN_FUNCTIONAL_ASSESSMENT: INTOLERABLE, UNABLE TO DO ANY ACTIVE OR PASSIVE ACTIVITIES
PAIN_FUNCTIONAL_ASSESSMENT: PREVENTS OR INTERFERES SOME ACTIVE ACTIVITIES AND ADLS
PAIN_FUNCTIONAL_ASSESSMENT: INTOLERABLE, UNABLE TO DO ANY ACTIVE OR PASSIVE ACTIVITIES
PAIN_FUNCTIONAL_ASSESSMENT: PREVENTS OR INTERFERES SOME ACTIVE ACTIVITIES AND ADLS
PAIN_FUNCTIONAL_ASSESSMENT: INTOLERABLE, UNABLE TO DO ANY ACTIVE OR PASSIVE ACTIVITIES
PAIN_FUNCTIONAL_ASSESSMENT: PREVENTS OR INTERFERES SOME ACTIVE ACTIVITIES AND ADLS
PAIN_FUNCTIONAL_ASSESSMENT: PREVENTS OR INTERFERES SOME ACTIVE ACTIVITIES AND ADLS

## 2020-01-01 ASSESSMENT — PAIN DESCRIPTION - ONSET
ONSET: GRADUAL
ONSET: GRADUAL
ONSET: ON-GOING
ONSET: GRADUAL
ONSET: ON-GOING
ONSET: GRADUAL
ONSET: ON-GOING
ONSET: GRADUAL
ONSET: ON-GOING
ONSET: GRADUAL
ONSET: ON-GOING
ONSET: GRADUAL
ONSET: ON-GOING
ONSET: ON-GOING
ONSET: GRADUAL
ONSET: ON-GOING

## 2020-01-01 ASSESSMENT — PAIN DESCRIPTION - PROGRESSION
CLINICAL_PROGRESSION: NOT CHANGED
CLINICAL_PROGRESSION: GRADUALLY WORSENING
CLINICAL_PROGRESSION: GRADUALLY WORSENING
CLINICAL_PROGRESSION: GRADUALLY IMPROVING
CLINICAL_PROGRESSION: GRADUALLY IMPROVING
CLINICAL_PROGRESSION: NOT CHANGED
CLINICAL_PROGRESSION: GRADUALLY WORSENING
CLINICAL_PROGRESSION: NOT CHANGED
CLINICAL_PROGRESSION: GRADUALLY IMPROVING
CLINICAL_PROGRESSION: NOT CHANGED
CLINICAL_PROGRESSION: GRADUALLY WORSENING
CLINICAL_PROGRESSION: NOT CHANGED
CLINICAL_PROGRESSION: GRADUALLY IMPROVING
CLINICAL_PROGRESSION: NOT CHANGED
CLINICAL_PROGRESSION: GRADUALLY IMPROVING
CLINICAL_PROGRESSION: GRADUALLY IMPROVING
CLINICAL_PROGRESSION: NOT CHANGED
CLINICAL_PROGRESSION: GRADUALLY IMPROVING
CLINICAL_PROGRESSION: GRADUALLY IMPROVING
CLINICAL_PROGRESSION: GRADUALLY WORSENING
CLINICAL_PROGRESSION: NOT CHANGED
CLINICAL_PROGRESSION: GRADUALLY IMPROVING
CLINICAL_PROGRESSION: NOT CHANGED
CLINICAL_PROGRESSION: NOT CHANGED
CLINICAL_PROGRESSION: GRADUALLY WORSENING
CLINICAL_PROGRESSION: NOT CHANGED

## 2020-01-01 ASSESSMENT — PAIN DESCRIPTION - LOCATION
LOCATION: BACK;HIP
LOCATION: HIP
LOCATION: ANKLE;HIP
LOCATION: HIP
LOCATION: HEAD
LOCATION: BACK
LOCATION: HIP
LOCATION: BACK
LOCATION: ANKLE;HIP
LOCATION: HIP
LOCATION: BACK
LOCATION: HIP
LOCATION: HEAD
LOCATION: BACK
LOCATION: HIP
LOCATION: BACK
LOCATION: HIP
LOCATION: BACK
LOCATION: HIP
LOCATION: HIP
LOCATION: ANKLE;HIP
LOCATION: BACK

## 2020-01-01 ASSESSMENT — PAIN SCALES - WONG BAKER

## 2020-01-01 ASSESSMENT — ENCOUNTER SYMPTOMS
RESPIRATORY NEGATIVE: 1
SHORTNESS OF BREATH: 0
EYES NEGATIVE: 1
VOMITING: 0
GASTROINTESTINAL NEGATIVE: 1
ABDOMINAL PAIN: 0
NAUSEA: 0
COUGH: 0
SHORTNESS OF BREATH: 0

## 2020-01-01 ASSESSMENT — PAIN DESCRIPTION - ORIENTATION
ORIENTATION: LEFT
ORIENTATION: ANTERIOR
ORIENTATION: LEFT
ORIENTATION: MID
ORIENTATION: LEFT
ORIENTATION: MID
ORIENTATION: LEFT
ORIENTATION: ANTERIOR
ORIENTATION: LEFT
ORIENTATION: MID

## 2020-01-01 ASSESSMENT — PAIN SCALES - GENERAL
PAINLEVEL_OUTOF10: 5
PAINLEVEL_OUTOF10: 5
PAINLEVEL_OUTOF10: 8
PAINLEVEL_OUTOF10: 0
PAINLEVEL_OUTOF10: 5
PAINLEVEL_OUTOF10: 8
PAINLEVEL_OUTOF10: 3
PAINLEVEL_OUTOF10: 4
PAINLEVEL_OUTOF10: 0
PAINLEVEL_OUTOF10: 9
PAINLEVEL_OUTOF10: 6
PAINLEVEL_OUTOF10: 0
PAINLEVEL_OUTOF10: 8
PAINLEVEL_OUTOF10: 6
PAINLEVEL_OUTOF10: 6
PAINLEVEL_OUTOF10: 4
PAINLEVEL_OUTOF10: 6
PAINLEVEL_OUTOF10: 8
PAINLEVEL_OUTOF10: 6
PAINLEVEL_OUTOF10: 6
PAINLEVEL_OUTOF10: 7
PAINLEVEL_OUTOF10: 4
PAINLEVEL_OUTOF10: 3
PAINLEVEL_OUTOF10: 4
PAINLEVEL_OUTOF10: 6
PAINLEVEL_OUTOF10: 8
PAINLEVEL_OUTOF10: 4
PAINLEVEL_OUTOF10: 4
PAINLEVEL_OUTOF10: 3
PAINLEVEL_OUTOF10: 8
PAINLEVEL_OUTOF10: 4
PAINLEVEL_OUTOF10: 8
PAINLEVEL_OUTOF10: 8
PAINLEVEL_OUTOF10: 0
PAINLEVEL_OUTOF10: 0
PAINLEVEL_OUTOF10: 5
PAINLEVEL_OUTOF10: 0
PAINLEVEL_OUTOF10: 10
PAINLEVEL_OUTOF10: 8
PAINLEVEL_OUTOF10: 7
PAINLEVEL_OUTOF10: 10
PAINLEVEL_OUTOF10: 8
PAINLEVEL_OUTOF10: 10
PAINLEVEL_OUTOF10: 0
PAINLEVEL_OUTOF10: 6
PAINLEVEL_OUTOF10: 8
PAINLEVEL_OUTOF10: 6
PAINLEVEL_OUTOF10: 8
PAINLEVEL_OUTOF10: 5
PAINLEVEL_OUTOF10: 9
PAINLEVEL_OUTOF10: 0

## 2020-01-01 ASSESSMENT — PAIN DESCRIPTION - PAIN TYPE
TYPE: SURGICAL PAIN
TYPE: ACUTE PAIN
TYPE: ACUTE PAIN
TYPE: SURGICAL PAIN
TYPE: ACUTE PAIN;CHRONIC PAIN
TYPE: SURGICAL PAIN
TYPE: ACUTE PAIN
TYPE: ACUTE PAIN;CHRONIC PAIN
TYPE: ACUTE PAIN;CHRONIC PAIN
TYPE: SURGICAL PAIN
TYPE: ACUTE PAIN
TYPE: SURGICAL PAIN
TYPE: ACUTE PAIN;CHRONIC PAIN
TYPE: ACUTE PAIN;CHRONIC PAIN
TYPE: SURGICAL PAIN
TYPE: ACUTE PAIN
TYPE: SURGICAL PAIN
TYPE: ACUTE PAIN
TYPE: SURGICAL PAIN
TYPE: ACUTE PAIN;CHRONIC PAIN
TYPE: SURGICAL PAIN
TYPE: ACUTE PAIN;CHRONIC PAIN

## 2020-01-01 ASSESSMENT — PAIN DESCRIPTION - DIRECTION
RADIATING_TOWARDS: LEFT LEG
RADIATING_TOWARDS: LT LEG
RADIATING_TOWARDS: LEFT LEG
RADIATING_TOWARDS: LT LEG
RADIATING_TOWARDS: LEFT LEG
RADIATING_TOWARDS: LT LEG
RADIATING_TOWARDS: LEFT LEG
RADIATING_TOWARDS: LT LEG
RADIATING_TOWARDS: LEFT LEG
RADIATING_TOWARDS: LEFT LEG
RADIATING_TOWARDS: LT LEG
RADIATING_TOWARDS: LEFT LEG
RADIATING_TOWARDS: LT LEG

## 2020-01-01 ASSESSMENT — PAIN DESCRIPTION - FREQUENCY
FREQUENCY: CONTINUOUS
FREQUENCY: INTERMITTENT
FREQUENCY: CONTINUOUS
FREQUENCY: INTERMITTENT
FREQUENCY: CONTINUOUS
FREQUENCY: INTERMITTENT
FREQUENCY: CONTINUOUS
FREQUENCY: CONTINUOUS
FREQUENCY: INTERMITTENT
FREQUENCY: INTERMITTENT
FREQUENCY: CONTINUOUS
FREQUENCY: CONTINUOUS
FREQUENCY: INTERMITTENT
FREQUENCY: INTERMITTENT
FREQUENCY: CONTINUOUS
FREQUENCY: INTERMITTENT
FREQUENCY: CONTINUOUS
FREQUENCY: INTERMITTENT
FREQUENCY: CONTINUOUS

## 2020-01-01 ASSESSMENT — LIFESTYLE VARIABLES: SMOKING_STATUS: 1

## 2020-01-16 NOTE — DISCHARGE SUMMARY
Hospitalist Discharge Summary   1/16/2020 7:56 AM  Subjective:   Admit Date: 12/21/2019  PCP: No primary care provider on file. Interval History: pt is ready for the discharge  Feels better  Came in with delirium  Seen by neurology  Symptoms were thought to be from alcohol abuse  Rest of the events as in progress notes and chart  Denies any other complaints  Chart reviewed. Diet: No diet orders on file  Medications:   Scheduled Meds:  Continuous Infusions:  CBC: No results for input(s): WBC, HGB, PLT in the last 72 hours. BMP:  No results for input(s): NA, K, CL, CO2, BUN, CREATININE, GLUCOSE in the last 72 hours. Hepatic: No results for input(s): AST, ALT, ALB, BILITOT, ALKPHOS in the last 72 hours. Troponin: No results for input(s): TROPONINI in the last 72 hours. BNP: No results for input(s): BNP in the last 72 hours. Lipids: No results for input(s): CHOL, HDL in the last 72 hours. Invalid input(s): LDLCALCU  INR: No results for input(s): INR in the last 72 hours. Orders Placed This Encounter   Procedures    CT Head WO Contrast     Standing Status:   Standing     Number of Occurrences:   1     Order Specific Question:   Has a \"code stroke\" or \"stroke alert\" been called?      Answer:   Yes     Order Specific Question:   Reason for exam:     Answer:   stroke: Expressive aphasia, mild ataxia, last seen normal last evening    CTA HEAD NECK W CONTRAST     Standing Status:   Standing     Number of Occurrences:   1     Order Specific Question:   Reason for exam:     Answer:   Code stroke    MRI BRAIN WO CONTRAST MR WITNESS     Standing Status:   Standing     Number of Occurrences:   1     Order Specific Question:   Reason for exam:     Answer:   diffusion in T2 flare, code stroke    XR CHEST STANDARD (2 VW)     Standing Status:   Standing     Number of Occurrences:   1     Order Specific Question:   Reason for exam:     Answer:   ams    CBC Auto Differential     Standing Status:   Standing     Number of Occurrences:   1    Basic Metabolic Panel     Standing Status:   Standing     Number of Occurrences:   1    Urine Drug Screen     Standing Status:   Standing     Number of Occurrences:   1    Urinalysis Reflex to Culture     Standing Status:   Standing     Number of Occurrences:   1    Ethanol     Standing Status:   Standing     Number of Occurrences:   1    Magnesium     Standing Status:   Standing     Number of Occurrences:   1    Phosphorus     Standing Status:   Standing     Number of Occurrences:   1    Protime-INR     Standing Status:   Standing     Number of Occurrences:   1    APTT     Standing Status:   Standing     Number of Occurrences:   1    Hepatic Function Panel     Standing Status:   Standing     Number of Occurrences:   1    CBC auto differential     Standing Status:   Standing     Number of Occurrences:   1    SPECIMEN REJECTION     Standing Status:   Standing     Number of Occurrences:   1    Telemetry monitoring     Standing Status:   Standing     Number of Occurrences:   1     Order Specific Question:   Patient may go off unit without monitor     Answer:   No    Inpatient consult to Family Practice     Standing Status:   Standing     Number of Occurrences:   1     Order Specific Question:   Reason for Consult? Answer:   Admission, altered mental status     Order Specific Question:   Provider Contacted? Answer:   No    Inpatient consult to Neurology     Standing Status:   Standing     Number of Occurrences:   1     Order Specific Question:   Reason for Consult? Answer:   Niles Delvalle Inpatient consult to Home Care Needs     Physician to Follow Referral: Kesha Wilson MD    I certify that I, or a nurse practioner or physician assistant working with me, had an in-person encounter with Yamilka Pemberton on 12/23/2019 and the reason for the home care services is documented in the clinical note for that day.       Yamilka Pemberton was assessed on the above date and was found to have medical conditions requiring Home Care that included cbc and cmp 1 wk. Homebound Status: further, I certify that my clinical findings support that Perfecto Mcardle does meet the Medicare definition of \"Confined to the Home\" because of   Deconditioned due to medical condition     Certification and medical necessity: I certify that, based on my findings, the following services are medically necessary home health services for Perfecto Mcardle for the following reasons:  - Vital signs monitoring: Nurse to perform BP, HR, RR, Temp, and Weight with each visit and teach patient and caregivers on taking daily. Nurse to call physician if pulse less than 50 or greater than 120, respiratory rate less than 12 or greater than 25, oral temperature greater than or equal to 871 oF, systolic BP less than 90 or greater than 894, diastolic BP less than 50 or greater than 100. Standing Status:   Standing     Number of Occurrences:   1     Order Specific Question:   Reason for Consult?      Answer:   Home Care Orders    OT eval and treat     Standing Status:   Standing     Number of Occurrences:   1    PT eval and treat     Standing Status:   Standing     Number of Occurrences:   1    EKG 12 Lead     Standing Status:   Standing     Number of Occurrences:   1     Order Specific Question:   Reason for Exam?     Answer:   Chest pain    Saline lock IV     Standing Status:   Standing     Number of Occurrences:   1    PATIENT STATUS (FROM ED OR OR/PROCEDURAL) Inpatient     Standing Status:   Standing     Number of Occurrences:   1     Order Specific Question:   Patient Class     Answer:   Inpatient [101]     Order Specific Question:   REQUIRED: Diagnosis     Answer:   Delirium [[id number]]     Order Specific Question:   Estimated Length of Stay     Answer:   Estimated stay of more than 2 midnights     Order Specific Question:   Future Attending Provider     Answer:   Rosa Area [[id number]]     Order Specific LORazepam (ATIVAN) tablet 2 mg    DISCONTD: LORazepam (ATIVAN) injection 2 mg    DISCONTD: LORazepam (ATIVAN) tablet 3 mg    DISCONTD: LORazepam (ATIVAN) injection 3 mg    DISCONTD: LORazepam (ATIVAN) tablet 4 mg    DISCONTD: LORazepam (ATIVAN) injection 4 mg    DISCONTD: albuterol sulfate  (90 Base) MCG/ACT inhaler 2 puff    DISCONTD: folic acid (FOLVITE) tablet 1 mg    DISCONTD: metoprolol succinate (TOPROL XL) extended release tablet 50 mg    DISCONTD: therapeutic multivitamin-minerals 1 tablet    DISCONTD: vitamin B-1 (THIAMINE) tablet 100 mg    DISCONTD: pantoprazole (PROTONIX) tablet 40 mg    DISCONTD: 0.9 % sodium chloride infusion    DISCONTD: sodium chloride flush 0.9 % injection 10 mL    DISCONTD: sodium chloride flush 0.9 % injection 10 mL    DISCONTD: magnesium hydroxide (MILK OF MAGNESIA) 400 MG/5ML suspension 30 mL    DISCONTD: ondansetron (ZOFRAN) injection 4 mg    DISCONTD: enoxaparin (LOVENOX) injection 40 mg    magnesium sulfate 1 g in dextrose 5% 100 mL IVPB    influenza quadrivalent split vaccine (FLUZONE;FLUARIX;FLULAVAL;AFLURIA) injection 0.5 mL    magnesium sulfate 2 g in 50 mL IVPB premix    DISCONTD: nicotine (NICODERM CQ) 14 MG/24HR 1 patch    Magnesium Oxide 400 MG CAPS     Sig: Po bid     Dispense:  60 capsule     Refill:  2           Objective:   Vitals: /86   Pulse 81   Temp 98.9 °F (37.2 °C) (Oral)   Resp 16   Ht 6' 3\" (1.905 m)   Wt 154 lb 5.2 oz (70 kg)   SpO2 95%   BMI 19.29 kg/m²   General appearance: alert,awake,oriented x 3 and cooperative with exam  HEENT: Head: Normal, normocephalic, atraumatic, anicteric, pupils are reactive to light. Dry mucous membrane.   Neck: no adenopathy, no carotid bruit, supple, symmetrical, trachea midline and thyroid not enlarged, symmetric, no tenderness/mass/nodules  Lungs: clear  Heart: regular rate and rhythm, S1, S2 normal, no murmur, click, rub or gallop  Abdomen: soft, non-tender; bowel sounds normal;

## 2020-01-18 PROBLEM — E87.20 METABOLIC ACIDOSIS: Status: ACTIVE | Noted: 2020-01-01

## 2020-01-18 NOTE — PROGRESS NOTES
Consult noted and x-rays / chart reviewed  Full consult in am with Arcelia  He will need operative repair of his left subtrochanteric hip fracture.   Likely planned for 1/20/20

## 2020-01-18 NOTE — ED PROVIDER NOTES
Types: Cigarettes    Smokeless tobacco: Never Used   Substance and Sexual Activity    Alcohol use: Yes     Comment: daily use (pint per day)    Drug use: No    Sexual activity: Yes     Partners: Female   Lifestyle    Physical activity:     Days per week: None     Minutes per session: None    Stress: None   Relationships    Social connections:     Talks on phone: None     Gets together: None     Attends [employer] service: None     Active member of club or organization: None     Attends meetings of clubs or organizations: None     Relationship status: None    Intimate partner violence:     Fear of current or ex partner: None     Emotionally abused: None     Physically abused: None     Forced sexual activity: None   Other Topics Concern    None   Social History Narrative    None       SCREENINGS             PHYSICAL EXAM    (up to 7 for level 4, 8 or more for level 5)     ED Triage Vitals   BP Temp Temp src Pulse Resp SpO2 Height Weight   -- -- -- -- -- -- -- --      height is 6' 3\" (1.905 m) and weight is 157 lb 13.6 oz (71.6 kg). His oral temperature is 97.7 °F (36.5 °C). His blood pressure is 154/77 (abnormal) and his pulse is 105. His respiration is 12 and oxygen saturation is 99%. Physical Exam  Constitutional: Poor hygiene. Smells of alcohol and urine. Uncomfortable. HENT:   Head: Normocephalic and atraumatic. Eyes: Conjunctivae and EOM are normal.   Neck: Neck supple. Mouth: Dry mucous membranes. Cardiovascular: Normal rate and intact distal pulses. Strong left DP/PT pulses. Cap refill <3s. Pulmonary/Chest: Lungs clear to auscultation. Effort normal. No respiratory distress. Lipoma on anterior chest, nontender, not fluctuant. No erythema. Abdominal: Nontender, exhibits no distension. Neurological: Alert. Answering questions appropriately. Slurred speech. Light touch sensation diffusely intact in left lower extremity. Musculoskeletal: Holding left leg externally rotated. UA DK YELLOW Straw/Yellow    Clarity, UA CLOUDY (A) Clear    Glucose, Ur Negative Negative mg/dL    Bilirubin Urine SMALL (A) Negative    Ketones, Urine Negative Negative mg/dL    Specific Gravity, UA 1.018 1.005 - 1.030    Blood, Urine SMALL (A) Negative    pH, UA 5.0 5.0 - 8.0    Protein, UA 30 (A) Negative mg/dL    Urobilinogen, Urine 1.0 <2.0 E.U./dL    Nitrite, Urine Negative Negative    Leukocyte Esterase, Urine Negative Negative    Microscopic Examination YES     Urine Type NotGiven     Urine Reflex to Culture Not Indicated    Lactic Acid, Plasma   Result Value Ref Range    Lactic Acid 3.6 (H) 0.4 - 2.0 mmol/L   Blood Gas, Venous   Result Value Ref Range    pH, Rock 7.312 (L) 7.350 - 7.450    pCO2, Rock 34.4 (L) 40.0 - 50.0 mmHg    pO2, Rock 95 Not Established mmHg    HCO3, Venous 17 (L) 23 - 29 mmol/L    Base Excess, Rock -8.1 Not Established mmol/L    O2 Sat, Rock 97 Not Established %    Carboxyhemoglobin 4.4 %    MetHgb, Rock 0.6 <1.5 %    TC02 (Calc), Rock 18 Not Established mmol/L    O2 Content, Rock 12 Not Established mL/dL    O2 Therapy Unknown    Microscopic Urinalysis   Result Value Ref Range    WBC, UA 4 0 - 5 /HPF    RBC, UA 2 0 - 4 /HPF    Epi Cells 2 0 - 5 /HPF   TYPE AND SCREEN   Result Value Ref Range    ABO/Rh O NEG     Antibody Screen NEG        All other labs were within normal range or not returned as of this dictation. EKG: All EKG's are interpreted by the Emergency Department Physician who either signs or co-signs this chart in the absence of a cardiologist.    EKG by my interpretation shows sinus tachycardia with rate of 134, normal axis, QTC of 513, no ST changes indicative of ischemia at this time. Compared to prior EKG dated December 21 of 2019, appears to be similar morphology.     RADIOLOGY:   plain film images such as CT, Ultrasound and MRI are read by the radiologist. Plain radiographic images are visualized and preliminarily interpreted by the ED Provider with the below nondisplaced fracture across the upper pole of the patella versus bipartite patella. *Visualized portions of the distal femur and proximal left tibia and fibula appear intact. *Osteoarthritis. *No retained foreign body. 1. Acute, nondisplaced fracture across the upper pole of the patella versus bipartite patella. 2. Visualized portions of the distal femur and proximal left tibia and fibula appear intact. 3. Osteoarthritis. Xr Chest Portable    Result Date: 1/18/2020  EXAMINATION: ONE XRAY VIEW OF THE CHEST 1/18/2020 1:00 pm COMPARISON: December 21, 2019 HISTORY: ORDERING SYSTEM PROVIDED HISTORY: tachycardia TECHNOLOGIST PROVIDED HISTORY: Reason for exam:->tachycardia Reason for Exam: tachycardia; fall Acuity: Acute Type of Exam: Initial FINDINGS: Cardiac silhouette is enlarged. No pneumothorax. No pleural effusion. Remote bilateral rib fractures. No acute findings           PROCEDURES   Unless otherwise noted below, none     Procedures    CRITICAL CARE TIME   Total Critical Care time was 35 minutes, excluding separately reportable procedures. There was a high probability of clinically significant/life threatening deterioration in the patient's condition which required my urgent intervention.        CONSULTS:  [address] [place] [address] COURSE and DIFFERENTIAL DIAGNOSIS/MDM:   Vitals:    Vitals:    01/18/20 1224 01/18/20 1301 01/18/20 1332 01/18/20 1438   BP: 92/69 98/68 111/77 (!) 154/77   Pulse: 134 121 108 105   Resp:  15 14 12   Temp:       TempSrc:       SpO2:  97% 100% 99%   Weight:       Height:           Patient was given the following medications:  Medications   LORazepam (ATIVAN) tablet 1 mg (has no administration in time range)     Or   LORazepam (ATIVAN) injection 1 mg (has no administration in time range)     Or   LORazepam (ATIVAN) tablet 2 mg (has no administration in time range)     Or   LORazepam (ATIVAN) injection 2 mg (has no administration in time range)

## 2020-01-18 NOTE — ED NOTES
Report called to [place], RN. SBAR discussed.  Will arrange for transport to [facility], RN  01/18/20 1600

## 2020-01-18 NOTE — ED NOTES
Pt A & O x 4. Respirations non-labored. Lungs CTAB. ST on monitor. No visible tremor but felt fingertip to fingertip. Swelling and erythema noted to left medial groin. Tender to palpation. Distal pulses intact. Spo2 noted to be at 88% on RA while sleeping. Pt placed on 2L NC with improvement to 95%. Fall risk screening completed. Fall risk bracelet applied to patient. Non-skid socks provided and placed on patient. The fall risk is indicated using  dome light . Based on score, a bed alarm was not indicated. The call light is within the patient's reach, and instructions for use were provided. The bed is in the lowest position with wheels locked. The patient has been advised to notify staff, using the call light, if there is a need to get up or use restroom. The patient verbalized understanding of safety precautions and how to contact staff for assistance.         Wero Coreas RN  01/18/20 6178

## 2020-01-18 NOTE — H&P
History and Physical    Admit Date: 1/18/2020    Patient's PCP: Dr. Tere Martini primary care provider on file. CC: femur and knee fx    HISTORY OF PRESENT ILLNESS:    This is a very pleasant 77 y.o. male with a history of multiple medical problems presenting with femur and knee Fx    Patient with COPD and alcohol abuse last drink - Friday   He had a mechanical fall falling off the bed and hitting his left hip and leg   He had a fracture of left femur and patella   He has no sob no fever   No LOC   He is not in withdrawal at the moment   He was found to be in metabolic acidosis Gap +  HCO3 of 13  His VS are stable   He has normal mentation     Past Medical / Surgical History:      Past Medical History:   Diagnosis Date    Alcohol abuse     COPD [facility]chronic obstructive pulmonary disease) ([facility] [address]     Tobacco abuse          History reviewed. No pertinent surgical history. Medications Prior to Admission:    Prior to Admission medications    Medication Sig Start Date End Date Taking? Authorizing Provider   Magnesium Oxide 400 MG CAPS Po bid 12/23/19   Rylan Florentino MD   albuterol sulfate  (90 Base) MCG/ACT inhaler Inhale 2 puffs into the lungs 4 times daily as needed for Wheezing 12/7/19   Rylan Florentino MD   vitamin B-1 (THIAMINE) 100 MG tablet Take 1 tablet by mouth daily 12/7/19   Rylan Florentino MD   omeprazole (PRILOSEC) 20 MG delayed release capsule Take 1 capsule by mouth daily 12/7/19   Rylan Florentino MD   metoprolol succinate (TOPROL XL) 50 MG extended release tablet Take 1 tablet by mouth daily 12/7/19   Rylan Florentino MD   folic acid (FOLVITE) 1 MG tablet Take 1 tablet by mouth daily 12/7/19   Rylan Florentino MD   Multiple Vitamins-Minerals (THERAPEUTIC MULTIVITAMIN-MINERALS) tablet Take 1 tablet by mouth daily 10/23/19 10/22/20  Rylan Florentino MD       Allergies:  Patient has no known allergies. Social History:   TOBACCO:   reports that he has been smoking cigarettes.  He has a 27.50 pack-year smoking history. He has never used smokeless tobacco.      ETOH:   reports current alcohol use. Family History:       Problem Relation Age of Onset    Other Mother         \"old age\"   Sury Stanton COPD Father     Breast Cancer Sister    Sury Stanton Cancer Brother         brain       Early CAD no  Diabetes Mellitus no  Cancer no      REVIEW OF SYSTEMS:  CONSTITUTIONAL:  Neg   Recent weight changes,fatigue,fever,chills or night sweats  EYES:  Neg  blurriness,tearing,itching or acute change in vision  EARS:  Neg  hearing loss,tinnitus,vertigo,discharge or earache. NOSE:  Neg  rhinorrhea,sneezing,itching,allergy or epistaxis  MOUTH/THROAT:  Neg  bleeding gums,hoarseness or sore throat. RESPIRATORY:   Neg  SOB,wheeze,cough,sputum,hemoptysis or brnochitis  CARDIOVASCULAR   Neg  chest pain,palpitations,dyspnea on exertion,orthopnea,paroxysmal nocturnal dyspnea or edema  GASTROINTESTINAL:  Neg   Appetite changes,nausea,vomiting,or diarrhea,indigestion,dysphagia,change in bowel movements, or abdominal pain. GENITOURINARY:  Neg  Urinary frequency,hesitancy,urgency,polyuria,dysuria,hematuria,nocturia,or incontinence. HEMATOLOGIC/LYMPHATIC:  Neg  Anemia,bleeding tendency  MUSCULOSKELETAL:  Neg   New myalgias,bone pain,joint pain,swelling or stiffness and has had no change in gait. NEUROLOGICAL:  Neg  Loss of Consciousness,memeory loss,forgetfulness,periods of confusion,difficulty concentrating,seizures,decline in intellect,nervousness,insomina,aphasia or dysarthria. SKIN :  Neg  skin or hair changes,and has no itching,rashes,sores. Denies breast lumps,masses,pain or discharge. PSYCHIATRIC:  Neg Depression,personality changes,anxiety. ENDOCRINE:  Neg  polydipsia,polyuria,abnormal weight changes,heat /cold intolerance. ALL/IMM :  Neg reactions to drugs other than listed,food,insects,skin rash,trouble breathing,local or general lymph node enlargement or tenderness.         PHYSICAL EXAM:  BP (!) 154/77   Pulse 105   Temp 97.7 °F (36.5 °C) (Oral)   Resp 12   Ht 6' 3\" (1.905 m)   Wt 157 lb 13.6 oz (71.6 kg)   SpO2 99%   BMI 19.73 kg/m²     No results for input(s): POCGLU in the last 72 hours. BP (!) 154/77   Pulse 105   Temp 97.7 °F (36.5 °C) (Oral)   Resp 12   Ht 6' 3\" (1.905 m)   Wt 157 lb 13.6 oz (71.6 kg)   SpO2 99%   BMI 19.73 kg/m²     General Appearance:    Alert, cooperative, no distress, appears stated age   Head:    Normocephalic, without obvious abnormality, atraumatic   Eyes:    PERRL   Ears:    Normal TM's and external ear canals, both ears   Nose:   Nares normal, septum midline, mucosa normal   Throat:   Lips, mucosa, and tongue normal; teeth and gums normal   Neck:   Supple, symmetrical, trachea midline, no adenopathy;        thyroid:  No enlargement/tenderness/nodules; no carotid    bruit or JVD       Lungs:     Clear to auscultation bilaterally, respirations unlabored   Chest wall:    No tenderness or deformity   Heart:    Regular rate and rhythm, S1 and S2 normal, no murmur, rub   or gallop   Abdomen:     Soft, non-tender, bowel sounds active all four quadrants,     no masses, no organomegaly           Extremities:   L hip and patella pain and edema    Pulses:   2+ and symmetric all extremities  Good capillary refill in upper and lower extremities   Skin:   Skin color, texture, turgor normal, no rashes or lesions       Neurologic:   CNII-XII intact. Normal strength, sensation and reflexes       throughout       LABS:  Recent Labs     01/18/20  1219   WBC 8.4   HGB 11.2*   HCT 33.4*   PLT 87*                                                                  Recent Labs     01/18/20  1219   *   K 5.0   CL 94*   CO2 13*   BUN 23*   CREATININE 2.5*   GLUCOSE 139*     Recent Labs     01/18/20  1219   AST 55*   ALT 43*   BILITOT 0.8   ALKPHOS 125     No results for input(s): TROPONINI in the last 72 hours. No results for input(s): BNP in the last 72 hours.   Lab Results   Component Value Date    PHART 7.518 06/29/2019    ACS7BHH 32.1 06/29/2019    PO2ART 64.5 06/29/2019     No results for input(s): INR in the last 72 hours. Recent Labs     01/18/20  1435   COLORU DK YELLOW   PHUR 5.0  5.0   WBCUA 4   RBCUA 2   CLARITYU CLOUDY*   SPECGRAV 1.018   LEUKOCYTESUR Negative   UROBILINOGEN 1.0   BILIRUBINUR SMALL*   BLOODU SMALL*   GLUCOSEU Negative        EKG Independently reviewed: normal EKG, normal sinus rhythm, unchanged from previous tracings. Previous medical records personally reviewed and analyzed     PHYSICIANS CERTIFICATION:    I certify that Chava Win is expected to be hospitalized for more than 2 midnights based on the following assessment and plan:        Assessment & Plan:    Patient Active Problem List:     Closed compression fracture of L1 lumbar vertebra     Leukopenia     Alcohol abuse     Tobacco abuse     COPD (chronic obstructive pulmonary disease) (HCC)     MRSA infection, UTI     Sepsis ([facility] [address]     Abnormal brain CT     Acute cystitis without hematuria     Closed fracture of right inferior pubic ramus (HCC)     Acute hyperactive alcohol withdrawal delirium (HCC)     Altered mental status     Candidal dermatitis     Hypokalemia     Delirium     Thrombocytopenia (HCC)     Metabolic acidosis          Metabolic acidosis anion gap / lactic acidosis  Chronic alcohol abuse   Hyponatremia   JOHN   Dehydration   Left femur and patella Fx    Plan   IV + HCO3  Lytes replacement   CIWA protcol   Follow Bicarb lactic acid and renal function   Ortho to see him       Andres López MD    The patient and / or the family were informed of the results of any tests, a time was given to answer questions, a plan was proposed and they agreed with plan. Thank you Dr. Car primary care provider on file. for the opportunity to be involved in this patients care. If you have any questions or concerns please feel free to contact me at [phone].     Prior

## 2020-01-18 NOTE — ED NOTES
Bed: S-49  Expected date: 1/18/20  Expected time: 11:47 AM  Means of arrival: [facility]  Comments:  61M chronic back pain     Bibiana Monae RN  01/18/20 9897

## 2020-01-19 PROBLEM — N17.9 AKI (ACUTE KIDNEY INJURY) (HCC): Status: ACTIVE | Noted: 2020-01-01

## 2020-01-19 PROBLEM — S72.142A CLOSED COMMINUTED INTERTROCHANTERIC FRACTURE OF LEFT FEMUR (HCC): Status: ACTIVE | Noted: 2020-01-01

## 2020-01-19 PROBLEM — D62 ACUTE BLOOD LOSS ANEMIA: Status: ACTIVE | Noted: 2020-01-01

## 2020-01-19 PROBLEM — E87.29 HIGH ANION GAP METABOLIC ACIDOSIS: Status: ACTIVE | Noted: 2020-01-01

## 2020-01-19 PROBLEM — S82.002A LEFT PATELLA FRACTURE: Status: ACTIVE | Noted: 2020-01-01

## 2020-01-19 NOTE — PROGRESS NOTES
Checking on patient Q2H for nutrition needs, hygiene needs, comfort measures, mobility, fall risk interventions, and safe environment. All precautions and interventions in place. Educated patient on use of call light and telephone. Patient verbalizes understanding. Call light/telephone in reach.   Electronically signed by Chris Vargas RN on 1/19/2020 at 8:39 AM

## 2020-01-19 NOTE — PROGRESS NOTES
[id number] [facility]  Consult Note    This patient is seen in consultation at the request of Dr Gato Alanis    Reason for Consult:  Left ST hip fx    CHIEF COMPLAINT:  Left hip pain    History Obtained From:  patient, electronic medical record    HISTORY OF PRESENT ILLNESS:    The patient is a 77 y.o. male who presents with left hip pain after a fall at his home. He is unsure how he fell but thinkx he fell out f bed. He has hx of ETOH abuse. . Pain is described in left hip and with the intensity of moderate at rest and severe with any acitvity with left leg. Pain is described as aching, shooting. Discomfort is constant. He is alert and oriented at this time in no distress, was asleep when I arrived. No other complaints. Past Medical History:        Diagnosis Date    Alcohol abuse     COPD (chronic obstructive pulmonary disease) ([facility] [address]     Tobacco abuse        Past Surgical History:    History reviewed. No pertinent surgical history.     Social History     Tobacco Use    Smoking status: Current Every Day Smoker     Packs/day: 0.50     Years: 55.00     Pack years: 27.50     Types: Cigarettes    Smokeless tobacco: Never Used   Substance Use Topics    Alcohol use: Yes     Comment: daily use (pint per day)       Family History   Problem Relation Age of Onset    Other Mother         \"old age\"   [de-identified] COPD Father     Breast Cancer Sister     Cancer Brother         brain           Current Medications:   Current Facility-Administered Medications: 0.9 % sodium chloride infusion 250 mL, 250 mL, Intravenous, Once  LORazepam (ATIVAN) tablet 1 mg, 1 mg, Oral, Q1H PRN **OR** LORazepam (ATIVAN) injection 1 mg, 1 mg, Intravenous, Q1H PRN **OR** LORazepam (ATIVAN) tablet 2 mg, 2 mg, Oral, Q1H PRN **OR** LORazepam (ATIVAN) injection 2 mg, 2 mg, Intravenous, Q1H PRN **OR** LORazepam (ATIVAN) tablet 3 mg, 3 mg, Oral, Q1H PRN **OR** LORazepam (ATIVAN) injection 3 mg, 3 mg, Intravenous, Q1H PRN **OR** LORazepam (ATIVAN) tablet 4

## 2020-01-19 NOTE — PLAN OF CARE
Problem: Falls - Risk of:  Goal: Will remain free from falls  Description  Will remain free from falls  1/19/2020 0030 by Lizbeth Moore RN  Outcome: Ongoing  1/18/2020 1641 by Monica Vazquez RN  Outcome: Ongoing  Note:   Fall risk assessment completed . Fall precautions in place, bed alarm on, side rails 2/4 up, call light in reach, educated pt on calling for assistance when needed, room clear of clutter. Pt verbalized understanding. Goal: Absence of physical injury  Description  Absence of physical injury  1/19/2020 0030 by Lizbeth Moore RN  Outcome: Ongoing  1/18/2020 1641 by Monica Vazquez RN  Outcome: Ongoing   Fall risk assessment completed. Fall precautions in place. Call light within reach. Pt educated on calling for assistance before getting up. Walkway free of clutter. Will continue to monitor. Problem: Risk for Impaired Skin Integrity  Goal: Tissue integrity - skin and mucous membranes  Description  Structural intactness and normal physiological function of skin and  mucous membranes. 1/19/2020 0030 by Lizbeth Moore RN  Outcome: Ongoing  1/18/2020 1641 by Monica Vazquez RN  Outcome: Ongoing  Note:   Tino score assessed. Patient able to ambulate and turn self and repositioned patient Q2H and assessed skin. Educated patient on importance of repositioning to prevent skin issues.  Electronically signed by Lizbeth Moore RN on 1/19/2020 at 12:31 AM   Problem: Physical Regulation:  Goal: Will remain free from infection  Description  Will remain free from infection  1/19/2020 0030 by Lizbeth Moore RN  Outcome: Ongoing  1/18/2020 1641 by Monica Vazquez RN  Outcome: Ongoing  Note:   Patient is alert and oriented, afebrile, has no complaints of pain, skin is intact and appropriate for ethnicity in color  Problem: Discharge Planning:  Goal: Discharged to appropriate level of care  Description  Discharged to appropriate level of care  1/19/2020 0030 by Lizbeth Moore

## 2020-01-19 NOTE — PROGRESS NOTES
Np aware of left leg swelling and erythemia, No new orders. Will continue to monitor and assess. Hart care provided, iv infusing as ordered. Bed alarm on. Ciwa score 0 at this time.  Will continue to monitor and assess Electronically signed by Stefan Chowdary RN on 1/19/2020 at 12:45 AM

## 2020-01-19 NOTE — PROGRESS NOTES
17*   BUN 23* 24*   CREATININE 2.5* 1.6*   CALCIUM 8.2* 7.4*     Recent Labs     01/18/20  1219 01/18/20  1652   AST 55* 41*   ALT 43* 35   BILITOT 0.8 0.7   ALKPHOS 125 105     Recent Labs     01/18/20  1652   INR 0.98     Recent Labs     01/18/20  1219   CKTOTAL 227       Urinalysis:      Lab Results   Component Value Date    NITRU Negative 01/18/2020    WBCUA 4 01/18/2020    BACTERIA Rare 10/15/2015    RBCUA 2 01/18/2020    BLOODU SMALL 01/18/2020    SPECGRAV 1.018 01/18/2020    GLUCOSEU Negative 01/18/2020       Radiology:  XR KNEE LEFT (1-2 VIEWS)   Final Result   1. Acute, nondisplaced fracture across the upper pole of the patella versus   bipartite patella. 2. Visualized portions of the distal femur and proximal left tibia and fibula   appear intact. 3. Osteoarthritis. XR HIP BILATERAL W AP PELVIS (2 VIEWS)   Final Result   Comminuted intertrochanteric left hip fracture         XR FEMUR LEFT (MIN 2 VIEWS)   Final Result   Left intertrochanteric fracture with displacement. Questionable fracture of the patella. Recommend dedicated left knee   radiographs. XR CHEST PORTABLE   Final Result   No acute findings         CT HIP LEFT WO CONTRAST    (Results Pending)           Assessment/Plan:    Active Hospital Problems    Diagnosis    Closed comminuted intertrochanteric fracture of left femur ([facility] [S72.142A]    Left patella fracture [S82.002A]    Acute blood loss anemia [D62]    JOHN (acute kidney injury) ([facility] [address] [N17.9]    High anion gap metabolic acidosis [D20.3]    Thrombocytopenia (HCC) [D69.6]    Tobacco abuse [Z72.0]    COPD (chronic obstructive pulmonary disease) ([facility]) [J44.9]    Alcohol abuse [F10.10]     1. left hip fracture, traumatic, ortho consulted, I am worried about hematoma with his decrease in Hb, low platelets and edema, I will get a ct scan of his hip, discussed with nurse.    2. Acute blood loss anemia, due to hip fracture, type screen, repeat CBC, monitor closely, might need transfusion. 3. High anion gap metabolic acidosis with elevated lactic acid on admission, likely alcohol abuse, aAKI related, doubt any infectious etiology at this time, iv fluids, improved. 4. JOHN, IV fluids, improved, hypovolemic  5. Alcohol abuse, counseled, thiamine, FA, CIWA. 6. Tobacco abuse, counseled. 7. Left patellar fracture ,traumatic, ortho following. 8. COPD, no acute exacerbation  9. Thrombocytopenia, likely due to alcohol abuse. Repeating CBC. 10. Mild elevated LFT, alcohol related, monitor for now.        DVT Prophylaxis: changing Lovenox to SCD  Diet: DIET GENERAL;  Code Status: Full Code      Dispo - Ongoing management     Angela Ace MD

## 2020-01-20 NOTE — ANESTHESIA PRE PROCEDURE
[facility] Department of Anesthesiology  Pre-Anesthesia Evaluation/Consultation       Name:  Christelle Hassan  : 1950  Age:  77 y.o. MRN:  [id number]  Date: 2020           Surgeon: Surgeon(s):  Robinson Cardozo MD    Procedure: Procedure(s):  LEFT HIP GAMMA NAIL     No Known Allergies  Patient Active Problem List   Diagnosis    Closed compression fracture of L1 lumbar vertebra    Leukopenia    Alcohol abuse    Tobacco abuse    [facility]    MRSA infection, UTI    Sepsis ([facility]    Abnormal brain CT    Acute cystitis without hematuria    Closed fracture of right inferior pubic ramus ([facility]    Acute hyperactive alcohol withdrawal delirium (HCC)    Altered mental status    Candidal dermatitis    Hypokalemia    Delirium    Thrombocytopenia (HCC)    High anion gap metabolic acidosis    Closed comminuted intertrochanteric fracture of left femur (HCC)    Left patella fracture    Acute blood loss anemia    JOHN (acute kidney injury) ([facility]     Past Medical History:   Diagnosis Date    Alcohol abuse     [facility]     Tobacco abuse      History reviewed. No pertinent surgical history. Social History     Tobacco Use    Smoking status: Current Every Day Smoker     Packs/day: 0.50     Years: 55.00     Pack years: 27.50     Types: Cigarettes    Smokeless tobacco: Never Used   Substance Use Topics    Alcohol use: Yes     Comment: daily use (pint per day)    Drug use: No     Medications  No current facility-administered medications on file prior to encounter.       Current Outpatient Medications on File Prior to Encounter   Medication Sig Dispense Refill    Magnesium Oxide 400 MG CAPS Po bid (Patient taking differently: Take 400 mg by mouth 2 times daily ) 60 capsule 2    albuterol sulfate  (90 Base) MCG/ACT inhaler Inhale 2 puffs into the lungs 4 times daily as needed for Wheezing 1 Inhaler 5    vitamin B-1 (THIAMINE) 100 MG tablet Take 1 tablet by mouth daily 90 tablet 3    omeprazole (PRILOSEC) 20 MG delayed release capsule Take 1 capsule by mouth daily 90 capsule 3    metoprolol succinate (TOPROL XL) 50 MG extended release tablet Take 1 tablet by mouth daily 90 tablet 3    folic acid (FOLVITE) 1 MG tablet Take 1 tablet by mouth daily 90 tablet 1    Multiple Vitamins-Minerals (THERAPEUTIC MULTIVITAMIN-MINERALS) tablet Take 1 tablet by mouth daily 30 tablet 11     Current Facility-Administered Medications   Medication Dose Route Frequency Provider Last Rate Last Dose    ceFAZolin (ANCEF) 2 g in dextrose 5 % 100 mL IVPB  2 g Intravenous On Call to [facility], MD        chlordiazePOXIDE (LIBRIUM) capsule 10 mg  10 mg Oral TID Gem Curiel DO   10 mg at 01/20/20 0825    multivitamin 1 tablet  1 tablet Oral Daily Gem Curiel DO        magnesium sulfate 4 g in 100 mL IVPB premix  4 g Intravenous Once Gem Curiel DO 25 mL/hr at 01/20/20 0829 4 g at 01/20/20 0829    LORazepam (ATIVAN) tablet 1 mg  1 mg Oral Q1H PRN Wade Gallardo MD   1 mg at 01/20/20 0243    Or    LORazepam (ATIVAN) injection 1 mg  1 mg Intravenous Q1H PRN Wade Gallardo MD   1 mg at 01/20/20 0932    Or    LORazepam (ATIVAN) tablet 2 mg  2 mg Oral Q1H PRN Wade Gallardo MD   2 mg at 01/18/20 2035    Or    LORazepam (ATIVAN) injection 2 mg  2 mg Intravenous Q1H PRN Wade Gallardo MD   2 mg at 80/08/88 4766    folic acid (FOLVITE) tablet 1 mg  1 mg Oral Daily Wade Gallardo MD   1 mg at 01/19/20 9418    metoprolol succinate (TOPROL XL) extended release tablet 50 mg  50 mg Oral Daily Wade Gallardo MD   50 mg at 01/20/20 0824    pantoprazole (PROTONIX) tablet 40 mg  40 mg Oral DOMENICM [place] Peres MD   40 mg at 01/20/20 0523    vitamin B-1 (THIAMINE) tablet 100 mg  100 mg Oral Daily Wade Gallardo MD   100 mg at 01/19/20 8518    sodium chloride flush 0.9 %

## 2020-01-20 NOTE — BRIEF OP NOTE
Brief Postoperative Note  ______________________________________________________________    Patient: Yamilet Varela  YOB: 1950  MRN: [id number]  Date of Procedure: 1/20/2020    Pre-Op Diagnosis: Left femur inter/subtrochanteric 4 parts fracture. Left patella fracture. Post-Op Diagnosis: Same       Procedure(s):  LEFT HIP GAMMA NAIL, close treatment left patella. Anesthesia: General    Surgeon(s):  Stevie Malone MD    Assistant: Lolita White DPM    Estimated Blood Loss (mL): less than 50     Complications: None    Specimens:   * No specimens in log *    Implants:  Implant Name Type Inv. Item Serial No.  Lot No. LRB No. Used   KIT LONG NAIL 5 LEFT TI Hip KIT LONG NAIL 5 LEFT TI  MELISSA: ORTH[employer] [id number] Left 1   SCREW LAG KEN 3 TI ST 10.5X95MM Screw/Plate/Nail/Felipe SCREW LAG KEN 3 TI ST 10.5X95MM  MELISSA: [employer] [facility] [id number] Left 1   SCREW LK FTHRD ST T2 5X57. 5MM Screw/Plate/Nail/Felipe SCREW LK FTHRD ST T2 5X57. 5MM  MELISSA: ORTHOPAEDICS [id number] Left 1   SCREW LK FTHRD ST T2 5X65MM Screw/Plate/Nail/Felipe SCREW LK FTHRD ST T2 5X65MM  MELISSA: ORTHOPAEDICS [id number] Left 1         Drains:   Urethral Catheter Straight-tip 18 fr (Active)   Catheter Indications Perioperative use in selected surgeries including but not limited to urologic, pelvic or need for intraoperative monitoring of urinary output due to prolonged surgery, large volume infusion or need for diuretic therapy in surgery 1/20/2020  7:45 AM   Site Assessment No urethral drainage 1/20/2020  4:12 PM   Urine Color Yellow 1/20/2020  4:12 PM   Urine Appearance Clear 1/20/2020  4:12 PM   Urine Odor Malodorous 1/20/2020  3:49 PM   Output (mL) 700 mL 1/20/2020  3:49 PM       [REMOVED] External Urinary Catheter (Removed)       Findings: Randy Almonte MD  Date: 1/20/2020  Time: 4:31 PM

## 2020-01-20 NOTE — PROGRESS NOTES
Hospitalist Progress Note      PCP: No primary care provider on file. Date of Admission: 1/18/2020        Subjective: Pt S/E. Pt just returned from hip surgery. He is sleepy. Medications:  Reviewed    Infusion Medications    IV infusion builder 100 mL/hr at 01/19/20 0330     Scheduled Medications    folic acid  1 mg Oral Daily    metoprolol succinate  50 mg Oral Daily    pantoprazole  40 mg Oral QAM AC    vitamin B-1  100 mg Oral Daily    sodium chloride flush  10 mL Intravenous 2 times per day     PRN Meds: LORazepam **OR** LORazepam **OR** LORazepam **OR** LORazepam **OR** LORazepam **OR** LORazepam **OR** LORazepam **OR** LORazepam, sodium chloride flush, magnesium hydroxide, ondansetron, acetaminophen, ketorolac      Intake/Output Summary (Last 24 hours) at 1/20/2020 0812  Last data filed at 1/20/2020 0536  Gross per 24 hour   Intake 1129.16 ml   Output 1950 ml   Net -820.84 ml       Physical Exam Performed:    /70   Pulse 75   Temp 99 °F (37.2 °C) (Oral)   Resp 18   Ht 6' 3\" (1.905 m)   Wt 155 lb 10.3 oz (70.6 kg)   SpO2 98%   BMI 19.45 kg/m²     General appearance: No apparent distress  Neck: Supple, no jvd  Respiratory:  Normal respiratory effort. Clear to auscultation, bilaterally without Rales/Wheezes/Rhonchi. Cardiovascular: Regular rate and rhythm with normal S1/S2 without murmurs, rubs or gallops. Abdomen: Soft, non-tender, non-distended  Musculoskeletal: No clubbing. Left hip in brace  Skin: Skin color, texture, turgor normal.  No rashes or lesions.   Neurologic:  No focal deficits  Psychiatric: Awake, oriented to name only  Capillary Refill: Brisk,< 3 seconds   Peripheral Pulses: +2 palpable, equal bilaterally       Labs:   Recent Labs     01/19/20  0551 01/19/20  0934 01/19/20  1812 01/20/20  0030 01/20/20  0531   WBC 4.0 4.1  --   --  3.7*   HGB 7.0* 7.0* 8.7*  9.0* 8.8* 8.5*   HCT 20.4* 20.6* 25.5*  26.6* 25.7* 24.7*   PLT 51* 52*  --   --  52*     Recent Labs 01/18/20  1219 01/18/20  1652 01/20/20  0531   * 133* 131*   K 5.0 4.4 3.8   CL 94* 100 98*   CO2 13* 17* 19*   BUN 23* 24* 11   CREATININE 2.5* 1.6* <0.5*   CALCIUM 8.2* 7.4* 8.0*     Recent Labs     01/18/20  1219 01/18/20  1652 01/20/20  0531   AST 55* 41* 65*   ALT 43* 35 33   BILITOT 0.8 0.7 1.5*   ALKPHOS 125 105 112     Recent Labs     01/18/20  1652   INR 0.98     Recent Labs     01/18/20  1219   CKTOTAL 227       Urinalysis:      Lab Results   Component Value Date    NITRU Negative 01/18/2020    WBCUA 4 01/18/2020    BACTERIA Rare 10/15/2015    RBCUA 2 01/18/2020    BLOODU SMALL 01/18/2020    SPECGRAV 1.018 01/18/2020    GLUCOSEU Negative 01/18/2020       Radiology:  XR KNEE LEFT (1-2 VIEWS)   Final Result   1. Acute, nondisplaced fracture across the upper pole of the patella versus   bipartite patella. 2. Visualized portions of the distal femur and proximal left tibia and fibula   appear intact. 3. Osteoarthritis. XR HIP BILATERAL W AP PELVIS (2 VIEWS)   Final Result   Comminuted intertrochanteric left hip fracture         XR FEMUR LEFT (MIN 2 VIEWS)   Final Result   Left intertrochanteric fracture with displacement. Questionable fracture of the patella. Recommend dedicated left knee   radiographs.          XR CHEST PORTABLE   Final Result   No acute findings             Assessment/Plan:    Active Hospital Problems    Diagnosis    Closed comminuted intertrochanteric fracture of left femur ([facility]) [S72.142A]    Left patella fracture [S82.002A]    Acute blood loss anemia [D62]    JOHN (acute kidney injury) ([facility] [N17.9]    High anion gap metabolic acidosis [V02.9]    Thrombocytopenia ([facility]) [D69.6]    Tobacco abuse [Z72.0]    COPD (chronic obstructive pulmonary disease) ([facility]) [J44.9]    Alcohol abuse [F10.10]     left hip fracture, traumatic  - ortho consulted: orif today with Dr Pierce Pac  - concern for hematoma with his decrease in Hb, low platelets and edema    Acute blood loss

## 2020-01-20 NOTE — DISCHARGE INSTR - COC
[facility] ([facility]) Continuity of Care Form    Patient Name:  Hunter Kaur  : 1950    MRN:  [id number]    [facility] date:  2020  Discharge date:  20    Code Status Order: Full Code  Advance Directives: No    Admitting Physician: Ralph Nelson DO  PCP: No primary care provider on file. Discharging Nurse: [facility] Unit/Room#: K4W-80/[id number]  Discharging Unit Phone Number: [phone]    Emergency Contact:        Past Surgical History:  Past Surgical History:   Procedure Laterality Date    HIP FRACTURE SURGERY Left 2020    LEFT HIP GAMMA NAIL performed by Romy Tejada MD at [facility] [place] [address]       Immunization History:   Immunization History   Administered Date(s) Administered    Influenza, High Dose (Fluzone 65 yrs and older) 2019    Influenza, [place], IM, PF (6 mo and older Fluzone, Flulaval, Fluarix, and 3 yrs and older Afluria) 2019    Pneumococcal Conjugate 13-valent ([place]) 2019       Active Problems:  Active Problems:    Alcohol abuse    Tobacco abuse    COPD (chronic obstructive pulmonary disease) (HCC)    Thrombocytopenia (HCC)    High anion gap metabolic acidosis    Closed comminuted intertrochanteric fracture of left femur (HCC)    Left patella fracture    Acute blood loss anemia    [facility]  Resolved Problems:    * No resolved hospital problems.  *      Isolation/Infection:       Nurse Assessment:  Last Vital Signs:/72   Pulse 96   Temp 97.3 °F (36.3 °C) (Axillary)   Resp 16   Ht 6' 3\" (1.905 m)   Wt 155 lb 10.3 oz (70.6 kg)   SpO2 97%   BMI 19.45 kg/m²   Last documented pain score (0-10 scale): Pain Level: 0  Last Weight:   Wt Readings from Last 1 Encounters:   20 155 lb 10.3 oz (70.6 kg)     Mental Status:  oriented and alert     IV Access:  - None    Nursing Mobility/ADLs:  Walking   Assisted  Transfer  Assisted  Bathing  Assisted  Dressing  Assisted  Toileting  Assisted  Feeding  Independent  Med Admin

## 2020-01-20 NOTE — PROGRESS NOTES
Patient in ciwa precautions. Patient scored a 12 on ciwa scale. Prn ativan administered as ordered. Fall risk assessment completed. Fall precautions in place. Call light within reach. Pt educated on calling for assistance before getting up. Walkway free of clutter. Will continue to monitor.    Electronically signed by Jamar Baez RN on 1/19/2020 at 8:23 PM

## 2020-01-20 NOTE — PROGRESS NOTES
[facility] Orthopedic Surgery   Progress Note      S/P :  SUBJECTIVE  In bed. Alert , oriented to self. Somewhat confused this AM and \"I want to get up to pee\". Informed him he has a catheter in but forgetful. Does not move in bed due to left hip pain. . Pain is   described in left hip and with the intensity of moderate. Pain is described as aching. OBJECTIVE              Physical                      VITALS:  /70   Pulse 75   Temp 99 °F (37.2 °C) (Oral)   Resp 18   Ht 6' 3\" (1.905 m)   Wt 155 lb 10.3 oz (70.6 kg)   SpO2 98%   BMI 19.45 kg/m²                     MUSCULOSKELETAL:  left foot NVI. Wiggles toes to command. Pedal pulses are palpable. Left hip externally rotated. Left knee flexed.                     NEUROLOGIC:                                  Sensory:  Touch:  Left Lower Extremity:  normal                                                     Data       CBC:   Lab Results   Component Value Date    WBC 3.7 01/20/2020    RBC 2.48 01/20/2020    HGB 8.5 01/20/2020    HCT 24.7 01/20/2020    MCV 99.3 01/20/2020    MCH 34.4 01/20/2020    MCHC 34.6 01/20/2020    RDW 18.6 01/20/2020    PLT 52 01/20/2020    MPV 10.0 01/20/2020        WBC:    Lab Results   Component Value Date    WBC 3.7 01/20/2020        Hemoglobin/Hematocrit:    Lab Results   Component Value Date    HGB 8.5 01/20/2020    HCT 24.7 01/20/2020        PT/INR:    Lab Results   Component Value Date    PROTIME 11.4 01/18/2020    INR 0.98 01/18/2020              Current Inpatient Medications             Current Facility-Administered Medications: chlordiazePOXIDE (LIBRIUM) capsule 10 mg, 10 mg, Oral, TID  multivitamin 1 tablet, 1 tablet, Oral, Daily  magnesium sulfate 4 g in 100 mL IVPB premix, 4 g, Intravenous, Once  LORazepam (ATIVAN) tablet 1 mg, 1 mg, Oral, Q1H PRN **OR** LORazepam (ATIVAN) injection 1 mg, 1 mg, Intravenous, Q1H PRN **OR** LORazepam (ATIVAN) tablet 2 mg, 2 mg, Oral, Q1H PRN **OR** LORazepam (ATIVAN) injection 2 mg, 2 mg, Intravenous, Q1H PRN **OR** [DISCONTINUED] LORazepam (ATIVAN) tablet 3 mg, 3 mg, Oral, Q1H PRN **OR** [DISCONTINUED] LORazepam (ATIVAN) injection 3 mg, 3 mg, Intravenous, Q1H PRN **OR** [DISCONTINUED] LORazepam (ATIVAN) tablet 4 mg, 4 mg, Oral, Q1H PRN **OR** [DISCONTINUED] LORazepam (ATIVAN) injection 4 mg, 4 mg, Intravenous, L6K PRN  folic acid (FOLVITE) tablet 1 mg, 1 mg, Oral, Daily  metoprolol succinate (TOPROL XL) extended release tablet 50 mg, 50 mg, Oral, Daily  pantoprazole (PROTONIX) tablet 40 mg, 40 mg, Oral, QAM AC  vitamin B-1 (THIAMINE) tablet 100 mg, 100 mg, Oral, Daily  sodium chloride flush 0.9 % injection 10 mL, 10 mL, Intravenous, 2 times per day  sodium chloride flush 0.9 % injection 10 mL, 10 mL, Intravenous, PRN  magnesium hydroxide (MILK OF MAGNESIA) 400 MG/5ML suspension 30 mL, 30 mL, Oral, Daily PRN  ondansetron (ZOFRAN) injection 4 mg, 4 mg, Intravenous, Q6H PRN  acetaminophen (TYLENOL) tablet 650 mg, 650 mg, Oral, Q4H PRN  ketorolac (TORADOL) injection 15 mg, 15 mg, Intravenous, Q6H PRN  sodium bicarbonate 50 mEq in sodium chloride 0.9 % 1,000 mL infusion, , Intravenous, Continuous    ASSESSMENT AND PLAN    IMPRESSION/RECOMMENDATIONS:  Fall  Left ST hip fx  Left patellar fx  ETOH abuse, on CIWA  ORIF left femur today, NPO for surgery    Crystal Echeverria [facility]  1/20/2020  9:24 AM

## 2020-01-20 NOTE — PROGRESS NOTES
Patient complaining of pain. Prn pain medication administered as ordered. Patient educated npo after midnight. Informed consent signed and in chart. Fall risk assessment completed. Fall precautions in place. Call light within reach. Pt educated on calling for assistance before getting up. Walkway free of clutter. Will continue to monitor. Iv fluids infusing as ordered.  Electronically signed by Margaret Valadez RN on 1/19/2020 at 7:22 PM

## 2020-01-20 NOTE — PROGRESS NOTES
Pacu. Pt sleeping without response to voice. Dressings cdi to left hip. Immobilizer in place.  [place]

## 2020-01-20 NOTE — PROGRESS NOTES
Pt rturned to room 3104 via bed from PACU. VSS. Denies pain. A&O x4. Dressings to LLE. D&I. Knee immobilizer in place. Normal distal pulses, brisk cap refill, normal sensation. IV site WDL. Call light within reach. Will continue to monitor.      Electronically signed by Jessica Mejia on 1/20/2020 at 4:27 PM

## 2020-01-20 NOTE — PLAN OF CARE
Problem: Falls - Risk of:  Goal: Will remain free from falls  Description  Will remain free from falls  1/20/2020 1041 by Richie Graff  Outcome: Ongoing  Note:   Fall risk assessment completed. Fall precautions in place. Call light within reach. Pt educated on calling for assistance before getting up. Walkway free of clutter. Will continue to monitor. 1/19/2020 2047 by Ana Maria Benavidez RN  Outcome: Ongoing  Goal: Absence of physical injury  Description  Absence of physical injury  1/20/2020 1041 by Richie Graff  Outcome: Ongoing  1/19/2020 2047 by Ana Maria Benavidez RN  Outcome: Ongoing     Problem: Risk for Impaired Skin Integrity  Goal: Tissue integrity - skin and mucous membranes  Description  Structural intactness and normal physiological function of skin and  mucous membranes. 1/20/2020 1041 by Richie Graff  Outcome: Ongoing  Note:   Will monitor skin and mucous members. Will turn patient every 2 hours, monitor for friction and sheering, and change dressings as needed. Will preform skin assessment every shift. 1/19/2020 2047 by Ana Maria Benavidez RN  Outcome: Ongoing     Problem: Physical Regulation:  Goal: Will remain free from infection  Description  Will remain free from infection  1/20/2020 1041 by Richie Graff  Outcome: Ongoing  Note:   Patient is alert and oriented, afebrile, skin is intact and appropriate for ethnicity in color. 1/19/2020 2047 by Ana Maria Benavidez RN  Outcome: Ongoing     Problem: Discharge Planning:  Goal: Discharged to appropriate level of care  Description  Discharged to appropriate level of care  1/20/2020 1041 by Richie Graff  Outcome: Ongoing  Note:   Working with patient, physician, and social work to discharge patient to the appropriate level of care.      1/19/2020 2047 by Ana Maria Benavidez RN  Outcome: Ongoing     Problem: Pain:  Goal: Pain level will decrease  Description  Pain level will decrease  1/20/2020 1041 by Richie Graff  Outcome:

## 2020-01-20 NOTE — PLAN OF CARE
Problem: Falls - Risk of:  Goal: Will remain free from falls  Description  Will remain free from falls  1/19/2020 2047 by Hunter Pineda RN  Outcome: Ongoing  1/19/2020 0835 by Efe Mckinney RN  Outcome: Ongoing  Note:   Fall risk assessment completed . Fall precautions in place, bed alarm on, side rails 2/4 up, call light in reach, educated pt on calling for assistance when needed, room clear of clutter. Pt verbalized understanding. Goal: Absence of physical injury  Description  Absence of physical injury  1/19/2020 2047 by Hunter Pineda RN  Outcome: Ongoing  1/19/2020 0835 by Efe Mckinney RN  Outcome: Ongoing   Fall risk assessment completed. Fall precautions in place. Call light within reach. Pt educated on calling for assistance before getting up. Walkway free of clutter. Will continue to monitor. Problem: Risk for Impaired Skin Integrity  Goal: Tissue integrity - skin and mucous membranes  Description  Structural intactness and normal physiological function of skin and  mucous membranes. 1/19/2020 2047 by Hunter Pineda RN  Outcome: Ongoing  1/19/2020 0835 by Efe Mckinney RN  Outcome: Ongoing  Note:   Tino score assessed. Patient able to ambulate and turn self and repositioned patient Q2H and assessed skin. Educated patient on importance of repositioning to prevent skin issues. Will monitor skin and mucous members. Will turn patient every 2 hours, monitor for friction and sheering, and change dressings as needed. Will preform skin assessment every shift.      Problem: Physical Regulation:  Goal: Will remain free from infection  Description  Will remain free from infection  1/19/2020 2047 by Hunter Pineda RN  Outcome: Ongoing  1/19/2020 0835 by Efe Mckinney RN  Outcome: Ongoing  Note:   Patient is alert and oriented, afebrile, has no complaints of pain, skin is intact and appropriate for ethnicity in color       Problem: Discharge Planning:  Goal: Discharged to appropriate level of care  Description  Discharged to appropriate level of care  1/19/2020 2047 by Benton Sharma RN  Outcome: Ongoing  1/19/2020 0835 by Janeth Myers RN  Outcome: Ongoing  Note:   Working with patient, physician, and social work to discharge patient to the appropriate level of care. Problem: Pain:  Goal: Pain level will decrease  Description  Pain level will decrease  1/19/2020 2047 by Benton Sharma RN  Outcome: Ongoing  1/19/2020 0835 by Janeth Myers RN  Outcome: Ongoing  Note:   Pain /discomfort being managed with PRN analgesics per MD orders. Patient able to express presence and absence of pain and rate pain appropriately using numerical scale. Goal: Control of acute pain  Description  Control of acute pain  1/19/2020 2047 by Benton Sharma RN  Outcome: Ongoing  1/19/2020 0835 by Janeth Myers RN  Outcome: Ongoing  Goal: Control of chronic pain  Description  Control of chronic pain  1/19/2020 2047 by Benton Sharma RN  Outcome: Ongoing  1/19/2020 0835 by Janeth Myers RN  Outcome: Ongoing  Prn pain medication given as ordered.

## 2020-01-21 NOTE — PROGRESS NOTES
Left hip dressing changed d/t moderate amt of serosanguinous drainage. Incision remains well approximated with staples intact. Scant amt of active bleeding noted. edema and bruising/redness to vasiliy wound area. Dressed with guaze and silver dressing. Pt tolerated well. Will continue to monitor.   Electronically signed by Shant Brito RN on 1/21/2020 at 4:08 PM

## 2020-01-21 NOTE — PROGRESS NOTES
patella fracture nonoperatively. PTA pt reports he lives alone in one story apt with 4-5 JOVANNY. Pt reports independence in self-care tasks, transfers, and homemaking tasks - unsure of accuracy. Pt reports use of RW or cane for mobility. Currently, pt presents with ROM/strength in [facility] for self-care tasks and transfers. Pt completed bed mobility with min/mod A and sit <> stand mod Ax2 with posterior lean. Pt completed stand pivot with RW with min/mod Ax2 - difficulty following safety cues. Anticipate pt to require max A for self-care tasks at this time. Pt is unsafe to discharge home at this time and would benefit from continued therapy to increase mobility, safety, and independence. Prognosis: Fair  Decision Making: Medium Complexity  History: PMH: COPD, ETOH abuse   Exam: ADLs, transfers, fun mob, bed mob  Assistance / Modification: min/mod Ax2 for mobility, mod/max A for ADLs   OT Education: OT Role;Precautions;Plan of Care;ADL Adaptive Strategies;Transfer Training  Barriers to Learning: Safety insight, cognition  REQUIRES OT FOLLOW UP: Yes  Activity Tolerance  Activity Tolerance: Patient limited by pain;Treatment limited secondary to decreased cognition  Safety Devices  Safety Devices in place: Yes(RN (Agatha) notified)  Type of devices: Left in chair;Call light within reach; Chair alarm in place;Gait belt;Nurse notified           Patient Diagnosis(es): The primary encounter diagnosis was Closed comminuted intertrochanteric fracture of left femur, initial encounter ([facility] [address] A diagnosis of Closed nondisplaced transverse fracture of left patella, initial encounter was also pertinent to this visit. has a past medical history of Alcohol abuse, COPD (chronic obstructive pulmonary disease) ([address] and Tobacco abuse.   has a past surgical history that includes Hip fracture surgery (Left, 1/20/2020).            Restrictions  Restrictions/Precautions  Restrictions/Precautions: Fall Risk, Weight Bearing  Required Braces of need for safety  Problem Solving: Assistance required to generate solutions;Assistance required to implement solutions  Insights: Decreased awareness of deficits  Initiation: Requires cues for some  Sequencing: Requires cues for some        Sensation  Overall Sensation Status: WFL        LUE AROM (degrees)  LUE AROM : WFL  Left Hand AROM (degrees)  Left Hand AROM: WFL  RUE AROM (degrees)  RUE AROM : WFL  Right Hand AROM (degrees)  Right Hand AROM: WFL     LUE Strength  Gross LUE Strength: WFL  RUE Strength  Gross RUE Strength: WFL          Plan   Plan  Times per week: 3-5  Times per day: Daily  Current Treatment Recommendations: Strengthening, Functional Mobility Training, Endurance Training, Balance Training, Safety Education & Training, Equipment Evaluation, Education, & procurement, Patient/Caregiver Education & Training, Self-Care / ADL    AM-[facility] Score    Pura Guajardo scored a 16/24 on the AM-[facility] ADL Inpatient form. Current research shows that an AM-PAC score of 17 or less is typically not associated with a discharge to the patient's home setting. Based on the patients AM-PAC score and their current ADL deficits, it is recommended that the patient have 3-5 sessions per week of Occupational Therapy at d/c to increase the patients independence. AM-PAC Inpatient Daily Activity Raw Score: 16 (01/21/20 1100)  AM-PAC Inpatient ADL T-Scale Score : 35.96 (01/21/20 1100)  ADL Inpatient CMS 0-100% Score: 53.32 (01/21/20 1100)  ADL Inpatient CMS G-Code Modifier : CK (01/21/20 1100)    Goals  Short term goals  Time Frame for Short term goals: prior to d/c  Short term goal 1: Pt will complete sit <> stand transfers with CGA while maintaining 25% on LLE.    Short term goal 2: Pt will complete stand pivot transfers with min A while maintaining 25% on LLE  Short term goal 3: Pt will tolerate 3+ minutes of standing to increase strength and activity tolerance for self-care and transfers   Patient Goals   Patient

## 2020-01-21 NOTE — PROGRESS NOTES
assess  Transfers  Sit to Stand: Moderate Assistance;2 Person Assistance  Stand to sit: Moderate Assistance;2 Person Assistance  Ambulation  Ambulation?: Yes  Ambulation 1  Surface: level tile  Device: Rolling Walker  Assistance: Minimal assistance; Moderate assistance;2 Person assistance  Quality of Gait: step to pattern -difficultly-  but able to maintain near 25% PWB  Distance: 2 feet or so  Comments: unsteady   Stairs/Curb  Stairs?: No  Balance  Comments: able to sit midline at EOB   -unsteady with attempts to gain a static stance- needing min/mod assist of 2 to assure safety  Exercises  Ankle Pumps: 30 per hour in easy pace  -adjusted the knee immobilizer to provide better fit and effectiveness (bent the posterior stay to relieve any pressure to his achilles)    Plan   Plan  Times per week: qd x1 then 3-5   Current Treatment Recommendations: Functional Mobility Training, Transfer Training, Gait Training, Modalities, Positioning, Patient/Caregiver Education & Training, ADL/Self-care Training  Safety Devices  Type of devices:  All fall risk precautions in place, Call light within reach, Chair alarm in place, Left in chair, Nurse notified(Agatha)    AM-PAC Score  AM-PAC Inpatient Mobility Raw Score : 11 (01/21/20 1144)  AM-PAC Inpatient T-Scale Score : 33.86 (01/21/20 1144)  Mobility Inpatient CMS 0-100% Score: 72.57 (01/21/20 1144)  Mobility Inpatient CMS G-Code Modifier : CL (01/21/20 1144)      Goals  Short term goals  Time Frame for Short term goals: 1-2 days   Short term goal 1: bed mobility at mod assist   Short term goal 2: transfers at mod assist   Short term goal 3: ambulation at min/mod assist of 1-2 with 25% PWB left LE using walker for 5-6 feet  Patient Goals   Patient goals : none formulated at this session       Therapy Time   Individual Concurrent Group Co-treatment   Time In       0800   Time Out       0855   Minutes       [address], PT

## 2020-01-21 NOTE — PROGRESS NOTES
Pt has not voided since catheter removal at 515 this morning. Bladder scanned for 183ml. Will continue to monitor.      Electronically signed by [facility] on 1/21/2020 at 11:46 AM

## 2020-01-21 NOTE — PROGRESS NOTES
Daily  oxyCODONE-acetaminophen (PERCOCET) 5-325 MG per tablet 1 tablet, 1 tablet, Oral, Q6H PRN  oxyCODONE-acetaminophen (PERCOCET) 5-325 MG per tablet 2 tablet, 2 tablet, Oral, Q6H PRN  LORazepam (ATIVAN) tablet 1 mg, 1 mg, Oral, Q1H PRN **OR** [DISCONTINUED] LORazepam (ATIVAN) injection 1 mg, 1 mg, Intravenous, Q1H PRN **OR** LORazepam (ATIVAN) tablet 2 mg, 2 mg, Oral, Q1H PRN **OR** [DISCONTINUED] LORazepam (ATIVAN) injection 2 mg, 2 mg, Intravenous, Q1H PRN **OR** [DISCONTINUED] LORazepam (ATIVAN) tablet 3 mg, 3 mg, Oral, Q1H PRN **OR** [DISCONTINUED] LORazepam (ATIVAN) injection 3 mg, 3 mg, Intravenous, Q1H PRN **OR** [DISCONTINUED] LORazepam (ATIVAN) tablet 4 mg, 4 mg, Oral, Q1H PRN **OR** [DISCONTINUED] LORazepam (ATIVAN) injection 4 mg, 4 mg, Intravenous, H8T PRN  folic acid (FOLVITE) tablet 1 mg, 1 mg, Oral, Daily  metoprolol succinate (TOPROL XL) extended release tablet 50 mg, 50 mg, Oral, Daily  pantoprazole (PROTONIX) tablet 40 mg, 40 mg, Oral, QAM AC  vitamin B-1 (THIAMINE) tablet 100 mg, 100 mg, Oral, Daily  sodium chloride flush 0.9 % injection 10 mL, 10 mL, Intravenous, 2 times per day  sodium chloride flush 0.9 % injection 10 mL, 10 mL, Intravenous, PRN  magnesium hydroxide (MILK OF MAGNESIA) 400 MG/5ML suspension 30 mL, 30 mL, Oral, Daily PRN  acetaminophen (TYLENOL) tablet 650 mg, 650 mg, Oral, Q4H PRN  ketorolac (TORADOL) injection 15 mg, 15 mg, Intravenous, Q6H PRN  sodium bicarbonate 50 mEq in sodium chloride 0.9 % 1,000 mL infusion, , Intravenous, Continuous    ASSESSMENT AND PLAN    Fall  Left ST hip fx  Left patellar fx  ETOH abuse, on CIWA  Post ORIF left ST fx with IM carmen yesterday per DR Boneta Plater, Stable exam  25% WB left leg in immobilizer.  PT OT to see  SS for DC planning  Lovenox as inpt then switch to ASA 81mg twice at day for 30 days for DVT prophylaxis    Alda Crocker [facility]  1/21/2020  9:13 AM

## 2020-01-21 NOTE — PLAN OF CARE
Problem: Falls - Risk of:  Goal: Will remain free from falls  Description  Will remain free from falls  Outcome: Ongoing  Note:   Fall risk assessment completed every shift. All precautions in place. Pt has call light within reach at all times. Room clear of clutter. Pt aware to call for assistance when getting up. Problem: Falls - Risk of:  Goal: Absence of physical injury  Description  Absence of physical injury  Outcome: Ongoing  Note:   Fall risk assessment completed . Fall precautions in place, bed/ chair alarm on, side rails 2/4 up, call light in reach, educated pt on calling for assistance when needed, room clear of clutter. Pt verbalized understanding. Problem: Risk for Impaired Skin Integrity  Goal: Tissue integrity - skin and mucous membranes  Description  Structural intactness and normal physiological function of skin and  mucous membranes. Outcome: Ongoing  Note:   Skin assessment complete. No new signs of skin breakdown noted. Repositioning patient with pillows at two hour intervals. Heels elevated off bed. Problem: Physical Regulation:  Goal: Will remain free from infection  Description  Will remain free from infection  Outcome: Ongoing  Note:   Pt taking antibiotics to tx an infection. Problem: Discharge Planning:  Goal: Discharged to appropriate level of care  Description  Discharged to appropriate level of care  Outcome: Ongoing     Problem: Pain:  Goal: Pain level will decrease  Description  Pain level will decrease  Outcome: Ongoing  Note:   Pt educated to attempt non-phagological method of pain control, but it it becomes too strong use PRN analgesics. Pain and discomfort being managed PRN analgesics per MD orders. Pt able to express presence of pain. Problem: Pain:  Goal: Control of acute pain  Description  Control of acute pain  Outcome: Ongoing  Note:   Patient educated on acute pain. Taught patient to use call light to ask for pain medication.   PRN pain medication

## 2020-01-21 NOTE — PLAN OF CARE
Problem: Falls - Risk of:  Goal: Will remain free from falls  Description  Will remain free from falls  1/21/2020 0746 by Sherwin Cherry  Outcome: Ongoing  Note:   Fall risk assessment completed. Fall precautions in place. Call light within reach. Pt educated on calling for assistance before getting up. Walkway free of clutter. Will continue to monitor. 1/21/2020 0143 by Carlin Hui RN  Outcome: Ongoing  Note:   Fall risk assessment completed every shift. All precautions in place. Pt has call light within reach at all times. Room clear of clutter. Pt aware to call for assistance when getting up. Goal: Absence of physical injury  Description  Absence of physical injury  1/21/2020 0746 by Sherwin Cherry  Outcome: Ongoing  1/21/2020 0143 by Carlin Hui RN  Outcome: Ongoing  Note:   Fall risk assessment completed . Fall precautions in place, bed/ chair alarm on, side rails 2/4 up, call light in reach, educated pt on calling for assistance when needed, room clear of clutter. Pt verbalized understanding. Problem: Risk for Impaired Skin Integrity  Goal: Tissue integrity - skin and mucous membranes  Description  Structural intactness and normal physiological function of skin and  mucous membranes. 1/21/2020 0746 by Sherwin Cherry  Outcome: Ongoing  Note:   Will monitor skin and mucous members. Will turn patient every 2 hours, monitor for friction and sheering, and change dressings as needed. Will preform skin assessment every shift.      1/21/2020 0143 by Carlin Hui RN  Outcome: Ongoing  Note:   Skin assessment complete. No new signs of skin breakdown noted. Repositioning patient with pillows at two hour intervals. Heels elevated off bed. Problem: Physical Regulation:  Goal: Will remain free from infection  Description  Will remain free from infection  1/21/2020 0746 by Sherwin Cherry  Outcome: Ongoing  Note:   Will remain free from infection.   1/21/2020 0143 by Franca Patel Alvino Rasmussen RN  Outcome: Ongoing  Note:   Pt taking antibiotics to tx an infection. Problem: Discharge Planning:  Goal: Discharged to appropriate level of care  Description  Discharged to appropriate level of care  1/21/2020 0746 by Vick Ruiz  Outcome: Ongoing  1/21/2020 0143 by Henrry Byers RN  Outcome: Ongoing     Problem: Pain:  Goal: Pain level will decrease  Description  Pain level will decrease  1/21/2020 0746 by Vick Ruiz  Outcome: Ongoing  Note:   Pt assessed for pain. Pt rated pain 8/10 and was assessed with 0-10 pain rating scale. Pt given prescribed analgesic for pain. (See eMar) Pt satisfied with pain relief thus far. Will reassess and continue to monitor. 1/21/2020 0143 by Henrry Byers RN  Outcome: Ongoing  Note:   Pt educated to attempt non-phagological method of pain control, but it it becomes too strong use PRN analgesics. Pain and discomfort being managed PRN analgesics per MD orders. Pt able to express presence of pain. Goal: Control of acute pain  Description  Control of acute pain  1/21/2020 0746 by Vick Ruiz  Outcome: Ongoing  1/21/2020 0143 by Henrry Byers RN  Outcome: Ongoing  Note:   Patient educated on acute pain. Taught patient to use call light to ask for pain medication. PRN pain medication given for acute pain. Will continue to monitor pain per unit protocol. Goal: Control of chronic pain  Description  Control of chronic pain  1/21/2020 0746 by Vick Ruiz  Outcome: Ongoing  1/21/2020 0143 by Henrry Byers RN  Outcome: Ongoing  Note:   Patient educated on chronic pain. Taught patient to use call light to ask for pain medication. PRN pain medication given for chronic pain. Will continue to monitor pain per unit protocol.

## 2020-01-22 NOTE — PROGRESS NOTES
[facility] Orthopedic Surgery   Progress Note      S/P :  SUBJECTIVE  In bed. Alert and oriented. . Pain is   described in left hip and knee and with the intensity of moderate. Pain is described as aching, throbbing. OBJECTIVE              Physical                      VITALS:  /62   Pulse 121   Temp 99.4 °F (37.4 °C) (Oral)   Resp 18   Ht 6' 3\" (1.905 m)   Wt 182 lb 8.7 oz (82.8 kg)   SpO2 95%   BMI 22.82 kg/m²                     MUSCULOSKELETAL:  left foot NVI. Wiggles toes to command. Pedal pulses are palpable. Left lateral hip with fullness, nontender, no bruising. NEUROLOGIC:                                  Sensory:  Touch:  Left Lower Extremity:  normal                                                 Surgical wound appears clean and dry left hip with Mepilex and left knee is in immobilizer for patellar fx.      Data       CBC:   Lab Results   Component Value Date    WBC 2.9 01/22/2020    RBC 2.09 01/22/2020    HGB 7.0 01/22/2020    HCT 20.6 01/22/2020    MCV 98.8 01/22/2020    MCH 33.5 01/22/2020    MCHC 33.9 01/22/2020    RDW 18.6 01/22/2020    PLT 80 01/22/2020    MPV 9.4 01/22/2020        WBC:    Lab Results   Component Value Date    WBC 2.9 01/22/2020        Hemoglobin/Hematocrit:    Lab Results   Component Value Date    HGB 7.0 01/22/2020    HCT 20.6 01/22/2020        PT/INR:    Lab Results   Component Value Date    PROTIME 11.4 01/18/2020    INR 0.98 01/18/2020              Current Inpatient Medications             Current Facility-Administered Medications: 0.9 % sodium chloride bolus, 20 mL, Intravenous, Once  0.9 % sodium chloride bolus, 20 mL, Intravenous, Once  chlordiazePOXIDE (LIBRIUM) capsule 10 mg, 10 mg, Oral, BID  [START ON 1/23/2020] metoprolol succinate (TOPROL XL) extended release tablet 25 mg, 25 mg, Oral, Daily  calcium-vitamin D 500-200 MG-UNIT per tablet 1 tablet, 1 tablet, Oral, BID  multivitamin 1 tablet, 1 tablet, Oral, Daily  docusate sodium (COLACE) observe.        Chris Jimenez  1/22/2020  10:13 AM

## 2020-01-22 NOTE — CARE COORDINATION
1/22 Olga Lidia with [facility], AN AFFILIATE OF [facility] is able to accept-- informed patient - he is agreeable to plan . [employer] precert initiated.   Electronically signed by Ivan Silverio on 1/22/2020 at 3:53 PM

## 2020-01-22 NOTE — PLAN OF CARE
Problem: Falls - Risk of:  Goal: Will remain free from falls  Description  Will remain free from falls  1/22/2020 0740 by Jahaira Kendrick RN  Outcome: Ongoing  Note:   Fall risk assessment completed . Fall precautions in place, bed alarm on, side rails 2/4 up, call light in reach, educated pt on calling for assistance when needed, room clear of clutter. Pt verbalized understanding. 1/22/2020 0349 by Janna Oconnell RN  Outcome: Ongoing  Note:   Fall risk assessment completed every shift. All precautions in place. Pt has call light within reach at all times. Room clear of clutter. Pt aware to call for assistance when getting up.    1/22/2020 0340 by Janna Oconnell RN  Outcome: Ongoing  Note:   Fall risk assessment completed every shift. All precautions in place. Pt has call light within reach at all times. Room clear of clutter. Pt aware to call for assistance when getting up. Goal: Absence of physical injury  Description  Absence of physical injury  1/22/2020 0740 by Jahaira Kendrick RN  Outcome: Ongoing  1/22/2020 0349 by Janna Oconnell RN  Outcome: Ongoing  Note:   Fall risk assessment completed . Fall precautions in place, bed/ chair alarm on, side rails 2/4 up, call light in reach, educated pt on calling for assistance when needed, room clear of clutter. Pt verbalized understanding. 1/22/2020 0340 by Janna Oconnell RN  Outcome: Ongoing  Note:   Fall risk assessment completed . Fall precautions in place, bed/ chair alarm on, side rails 2/4 up, call light in reach, educated pt on calling for assistance when needed, room clear of clutter. Pt verbalized understanding. Problem: Risk for Impaired Skin Integrity  Goal: Tissue integrity - skin and mucous membranes  Description  Structural intactness and normal physiological function of skin and  mucous membranes. 1/22/2020 0740 by Jahaira Kendrick RN  Outcome: Ongoing  Note:   Tino score assessed.  Patient able to ambulate and turn self

## 2020-01-22 NOTE — PROGRESS NOTES
Hospitalist Progress Note      PCP: No primary care provider on file. Date of Admission: 1/18/2020        Subjective: Pt S/E. hgb down to 6.4 overnight, received 1 unit prbcs. Now only 7.0. Pt denies fevers, chest pain, SOB, nausea, vomiting, abdominal pain, dizziness. Medications:  Reviewed    Infusion Medications     Scheduled Medications    sodium chloride  20 mL Intravenous Once    calcium-vitamin D  1 tablet Oral BID    chlordiazePOXIDE  10 mg Oral TID    multivitamin  1 tablet Oral Daily    docusate sodium  100 mg Oral BID    sennosides-docusate sodium  1 tablet Oral BID    enoxaparin  40 mg Subcutaneous Daily    folic acid  1 mg Oral Daily    metoprolol succinate  50 mg Oral Daily    pantoprazole  40 mg Oral QAM AC    vitamin B-1  100 mg Oral Daily    sodium chloride flush  10 mL Intravenous 2 times per day     PRN Meds: ondansetron, oxyCODONE-acetaminophen, oxyCODONE-acetaminophen, LORazepam **OR** [DISCONTINUED] LORazepam **OR** LORazepam **OR** [DISCONTINUED] LORazepam **OR** [DISCONTINUED] LORazepam **OR** [DISCONTINUED] LORazepam **OR** [DISCONTINUED] LORazepam **OR** [DISCONTINUED] LORazepam, sodium chloride flush, magnesium hydroxide, acetaminophen, ketorolac      Intake/Output Summary (Last 24 hours) at 1/22/2020 0842  Last data filed at 1/22/2020 0137  Gross per 24 hour   Intake 858.33 ml   Output 250 ml   Net 608.33 ml       Physical Exam Performed:    /62   Pulse 121   Temp 99.4 °F (37.4 °C) (Oral)   Resp 18   Ht 6' 3\" (1.905 m)   Wt 182 lb 8.7 oz (82.8 kg)   SpO2 95%   BMI 22.82 kg/m²     General appearance: No apparent distress, appears comfortable  Neck: Supple, no jvd, trachea midline  Respiratory:  Normal respiratory effort. Clear to auscultation, bilaterally without Rales/Wheezes/Rhonchi. Cardiovascular: Regular rate and rhythm with normal S1/S2 without murmurs, rubs or gallops. Abdomen: Soft, non-tender, non-distended  Musculoskeletal: No clubbing. Left hip in brace  Skin: Skin color, texture, turgor normal.  No rashes or lesions. Neurologic:  No focal deficits, no motor or sensory deficits   Psychiatric: Awake, oriented  Capillary Refill: Brisk,< 3 seconds   Peripheral Pulses: +2 palpable, equal bilaterally       Labs:   Recent Labs     01/20/20  0531  01/21/20  0553 01/21/20  1229 01/21/20  1821 01/22/20  0647   WBC 3.7*  --  4.4  --   --  2.9*   HGB 8.5*   < > 7.4* 7.2* 6.4* 7.0*   HCT 24.7*   < > 21.8* 21.0* 18.7* 20.6*   PLT 52*  --  74*  --   --  80*    < > = values in this interval not displayed. Recent Labs     01/20/20  0531   *   K 3.8   CL 98*   CO2 19*   BUN 11   CREATININE <0.5*   CALCIUM 8.0*     Recent Labs     01/20/20  0531   AST 65*   ALT 33   BILITOT 1.5*   ALKPHOS 112     No results for input(s): INR in the last 72 hours. No results for input(s): Tao Deacon in the last 72 hours. Urinalysis:      Lab Results   Component Value Date    NITRU Negative 01/18/2020    WBCUA 4 01/18/2020    BACTERIA Rare 10/15/2015    RBCUA 2 01/18/2020    BLOODU SMALL 01/18/2020    SPECGRAV 1.018 01/18/2020    GLUCOSEU Negative 01/18/2020       Radiology:  XR HIP LEFT (2-3 VIEWS)   Final Result   Intraoperative imaging provided for ORIF of a comminuted, intertrochanteric   fracture of the left hip. FLUORO FOR SURGICAL PROCEDURES   Final Result      XR KNEE LEFT (1-2 VIEWS)   Final Result   1. Acute, nondisplaced fracture across the upper pole of the patella versus   bipartite patella. 2. Visualized portions of the distal femur and proximal left tibia and fibula   appear intact. 3. Osteoarthritis. XR HIP BILATERAL W AP PELVIS (2 VIEWS)   Final Result   Comminuted intertrochanteric left hip fracture         XR FEMUR LEFT (MIN 2 VIEWS)   Final Result   Left intertrochanteric fracture with displacement. Questionable fracture of the patella. Recommend dedicated left knee   radiographs.          XR CHEST PORTABLE   Final

## 2020-01-22 NOTE — OP NOTE
such procedure. OPERATIVE PROCEDURE:  The patient's left hip was marked. He received 2  gm of Ancef IV preoperatively. The patient was then brought to the  operating room, underwent general anesthesia. He was then transferred  to the fracture table. The left foot was secured in a foot shell and  the right leg in a leg bolster. A closed reduction was done and there  was significant displacement given that the patient had a four-part  fracture. At this point, we made a decision to proceed with an open  reduction of his fracture. We had the left hip prepped and draped in  regular sterile routine fashion. A time-out was called, confirming the  patient's name, site, and procedure. We first started on the fracture site. An incision was made over the  proximal part of the femur. The fascia amber was incised the length of  the incision. The fracture was then exposed and fracture hematoma was  encountered and was evacuated. We then carefully were able to  manipulate the fracture between elevating the proximal shaft and using a  bone hook, we were able to reduce the neck part in place. It was  significantly challenging, physically and mentally very difficult,  required significant physical and mental effort and extra personnel to  help in keeping the reduction while placing the gamma nail. We were  satisfied of reasonable reduction at this point. We then made an incision in the proximal part of the hip to act as the  entry point. We used an awl to create the entry point, which was  confirmed with C-arm in two planes to be in good position. At this  point, we passed the guidewire distally. We were able to confirm that  the wire was in good position and 440 was found to be appropriate size  gamma nail. We then used a 15-mm opening reamer to open the entry  point. All was done while maintaining the reduction, which was  significantly challenging.   At this point, we passed the gamma nail over  the guidewire distally. We then, while maintaining the reduction, used an extra pin to prevent  rotation of the neck and then we placed the wire in the central part of  the head and neck in both AP and lateral plane. We then used a 95-mm  hip screw with good purchase. At this point, we used the set screws to  keep it in place. We then used the aiming arm, and we were able to  place two distal locking screws. C-arm confirmed a near anatomic  reduction of the four-part fracture. We then irrigated the incision  copiously with normal saline mixed with gentamicin. We closed the  fascia amber with an 0-Vicryl, subcu with 2-0 and 3-0 Vicryl and the skin  with staples. Dressing was then applied in the form of Xeroform, 4x4,  and tape. The patient tolerated the procedure well and was taken to the recovery  in stable condition. Ron Mckeon CNP was 1st Assist given the nature of the procedure that needed advanced assistance. POSTOPERATIVE PLAN:  The patient will be re-admitted as an inpatient. We will start PT/OT with only toe-touch weightbearing given the  four-part fracture for at least six weeks the patella will be treated  closed in the knee immobilizer and we can start bending the knee  starting in two weeks. He likely would need a rehab placement.         Giovanny Uribe MD    D: 01/21/2020 12:15:00       T: 01/21/2020 15:24:07     /[email or web address]  Job#: [id number]     Doc#: [id number]    CC:

## 2020-01-22 NOTE — PROGRESS NOTES
Physical Therapy    Carmenza Kim  1/22/2020   To receive blood transfusion (one unit) this morning. Will attempt to see later when transfusion is complete.   Electronically signed by Kelly Byers PT on 1/22/2020 at 9:25 AM

## 2020-01-22 NOTE — PLAN OF CARE
Problem: Falls - Risk of:  Goal: Will remain free from falls  Description  Will remain free from falls  1/22/2020 0349 by Monica Castro RN  Outcome: Ongoing  Note:   Fall risk assessment completed every shift. All precautions in place. Pt has call light within reach at all times. Room clear of clutter. Pt aware to call for assistance when getting up. Problem: Falls - Risk of:  Goal: Absence of physical injury  Description  Absence of physical injury  1/22/2020 0349 by Monica Castro RN  Outcome: Ongoing  Note:   Fall risk assessment completed . Fall precautions in place, bed/ chair alarm on, side rails 2/4 up, call light in reach, educated pt on calling for assistance when needed, room clear of clutter. Pt verbalized understanding. Problem: Risk for Impaired Skin Integrity  Goal: Tissue integrity - skin and mucous membranes  Description  Structural intactness and normal physiological function of skin and  mucous membranes. 1/22/2020 0349 by Monica Castro RN  Outcome: Ongoing  Note:   Skin assessment complete. No new signs of skin breakdown noted. Repositioning patient with pillows at two hour intervals. Heels elevated off bed. Problem: Physical Regulation:  Goal: Will remain free from infection  Description  Will remain free from infection  1/22/2020 0349 by Monica Castro RN  Outcome: Ongoing  1/22/2020 0340 by Monica Castro RN  Outcome: Ongoing  Note:   Assessment of surgical site complete. No signs of redness, warmth or swelling noted. Afebrile. Education provided on signs and symptoms of surgical site infections.       Problem: Discharge Planning:  Goal: Discharged to appropriate level of care  Description  Discharged to appropriate level of care  1/22/2020 0349 by Monica Castro RN  Outcome: Ongoing  Note:   Pt await precert for SNF     Problem: Discharge Planning:  Goal: Discharged to appropriate level of care  Description  Discharged to appropriate level of

## 2020-01-22 NOTE — PROGRESS NOTES
Physical Therapy    Magali Console  1/22/2020  Called to room to assist with repositioning patient up in bed. Lead to need to change linens as patient apparently had attempted to use the urinal and completely missed. Mod assist to roll left and right while also taking care of the left LE from twisting. This was quite fatiguing to him- deferred OOB. At conclusion, he is resting in supine with needs in reach and bed alarm on. Will mobilize from bed to recliner tomorrow AM.  Nursing in room.   Electronically signed by Jerson Hernandez PT on 1/22/2020 at 2:59 PM

## 2020-01-22 NOTE — PLAN OF CARE
Problem: Falls - Risk of:  Goal: Will remain free from falls  Description  Will remain free from falls  Outcome: Ongoing  Note:   Fall risk assessment completed every shift. All precautions in place. Pt has call light within reach at all times. Room clear of clutter. Pt aware to call for assistance when getting up. Problem: Falls - Risk of:  Goal: Absence of physical injury  Description  Absence of physical injury  Outcome: Ongoing  Note:   Fall risk assessment completed . Fall precautions in place, bed/ chair alarm on, side rails 2/4 up, call light in reach, educated pt on calling for assistance when needed, room clear of clutter. Pt verbalized understanding. Problem: Risk for Impaired Skin Integrity  Goal: Tissue integrity - skin and mucous membranes  Description  Structural intactness and normal physiological function of skin and  mucous membranes. Outcome: Ongoing  Note:   Skin assessment complete. No new signs of skin breakdown noted. Repositioning patient with pillows at two hour intervals. Heels elevated off bed. Problem: Physical Regulation:  Goal: Will remain free from infection  Description  Will remain free from infection  Outcome: Ongoing  Note:   Assessment of surgical site complete. No signs of redness, warmth or swelling noted. Afebrile. Education provided on signs and symptoms of surgical site infections. Problem: Discharge Planning:  Goal: Discharged to appropriate level of care  Description  Discharged to appropriate level of care  Outcome: Ongoing  Note:   Patient awaiting precert for SNF. Problem: Pain:  Goal: Pain level will decrease  Description  Pain level will decrease  Outcome: Ongoing  Note:   Pt educated to attempt non-phagological method of pain control, but it it becomes too strong use PRN analgesics. Pain and discomfort being managed PRN analgesics per MD orders. Pt able to express presence of pain.       Problem: Pain:  Goal: Control of acute pain  Description  Control of acute pain  Outcome: Ongoing  Note:   Patient educated on acute pain. Taught patient to use call light to ask for pain medication. PRN pain medication given for acute pain. Will continue to monitor pain per unit protocol. Problem: Pain:  Goal: Control of chronic pain  Description  Control of chronic pain  Outcome: Ongoing  Note:   Patient educated on chronic pain. Taught patient to use call light to ask for pain medication. PRN pain medication given for chronic pain. Will continue to monitor pain per unit protocol.

## 2020-01-23 PROBLEM — E87.29 HIGH ANION GAP METABOLIC ACIDOSIS: Status: RESOLVED | Noted: 2020-01-01 | Resolved: 2020-01-01

## 2020-01-23 NOTE — CARE COORDINATION
[employer] Medicare Authorization      Authorization ID [id number]     Start date 1/23/20    Next review date  1/24/20    Fax update to  Ceci Franklin @ [phone]    Therapy MPW  -  002      PDPM    PT/OT  -  TE    [address]

## 2020-01-23 NOTE — PROGRESS NOTES
Pt A&O x4. Vitals stable and WDL. AM meds completed. Percocet given for pain rating on 0-10 scale of 8. Dressing to L hip with large amt of sanguinous drainage, reinforced, will tell Arcelia NP. H&H has remained stable. Immobilizer remains in place to L knee. ZEYAD pedal pulses palpable. Will monitor.   Electronically signed by Estella Esparza RN on 1/23/2020 at 9:30 AM

## 2020-01-23 NOTE — PLAN OF CARE
Labs monitored. Surgical incision and dressing clean, dry, intact. No odor. Post op antibiotics administered as directed. Problem: Mobility - Impaired:  Goal: Mobility will improve to maximum level  Description  Mobility will improve to maximum level  Outcome: Ongoing  Note:   Pt mobility has improved. Pt able to assist with turns and repositioning. Pt requires use of x2 assist with walker and gait belt. PT/OT working with patient. SNF planned for further rehab.

## 2020-01-23 NOTE — PROGRESS NOTES
Data- discharge order received, pt verbalized agreement to discharge to [facility]. Action- report called to [address] at [facility]. Discharge instructions prepared and given to EMS driver, pt verbalized understanding. Discharge instruction summary: Diet- general, Activity- x2 assist with walker, Primary Care Physician as follows: No primary care provider on file. None f/u appointment to scheduled with Dr Joanne Jacobs in 2 weeks, immunizations reviewed and up to date, prescription medication hard copies placed in packet for use at SNF. Inpatient surgical procedure precautions reviewed: PWB 25% to LLE. Response- Pt belongings gathered, IV removed. Disposition SNF, [facility] with [facility], taken to lobby via stretcher, no complications.      Electronically signed by Amisha Stoll RN on 1/23/2020 at 6:30 PM

## 2020-01-23 NOTE — DISCHARGE SUMMARY
Hospital Medicine Discharge Summary    Patient: Tony Denise     Gender: male  : 1950   Age: 77 y.o. MRN: [id number]    Code Status: Full Code     Primary Care Provider: No primary care provider on file. Admit Date: 2020   Discharge Date:   2020    Admitting Physician: Farzaneh Cash DO  Discharge Physician: Farzaneh Cash DO     Discharge Diagnoses: Active Hospital Problems    Diagnosis Date Noted    Closed comminuted intertrochanteric fracture of left femur ([facility] [address] [S72.142A] 2020    Left patella fracture [S82.002A] 2020    Acute blood loss anemia [D62] 2020    JOHN (acute kidney injury) ([facility] [address] [N17.9] 2020    Thrombocytopenia ([facility] [address] [D69.6]     Tobacco abuse [Z72.0] 2018    COPD (chronic obstructive pulmonary disease) ([facility] [address] [J44.9] 2018    Alcohol abuse [F10.10] 2018       Hospital Course: A 76 yo male admitted with a left hip fracture and alcohol withdrawal.   left hip fracture, traumatic  - ortho consulted  - s/p orif 2020 with Dr Anuj Richardson  - cont pt/ot at [facility]     Acute blood loss anemia - improving  - due to hip fracture, some dilutional effects as well  - follow h/h   - hgb down to 6.4, received 1 unit prbcs. hgb now stable > 9 x 2 draws today  - Fe studies with low Fe, normal ferritin, c/w acute blood loss  - replaced with venofer in pt, PO tabs at discharge  -  occult blood in stool unable to be collected     Thrombocytopenia, likely due to alcohol abuse. closely monitor, Platelets increasing  Mild elevated LFT, alcohol related - resolved     etOH withdrawal - reslved  - CIWA; weaned off librium  - stopped ativan  - cont thiamine, folate, MVI     Lytes/fen  - replaced Mg, Ca  - vitamin D - low  - started oscal replacement      Tobacco abuse, counseled.      Left patellar fracture ,traumatic, ortho following. COPD, no acute exacerbation    Disposition:   To a non-[facility] facility    Exam:     BP (!) 110/58   Pulse 102   Temp 97.7 12/21/2019    INR 1.01 05/30/2019       Radiology:  XR HIP LEFT (2-3 VIEWS)   Final Result   Intraoperative imaging provided for ORIF of a comminuted, intertrochanteric   fracture of the left hip. FLUORO FOR SURGICAL PROCEDURES   Final Result      XR KNEE LEFT (1-2 VIEWS)   Final Result   1. Acute, nondisplaced fracture across the upper pole of the patella versus   bipartite patella. 2. Visualized portions of the distal femur and proximal left tibia and fibula   appear intact. 3. Osteoarthritis. XR HIP BILATERAL W AP PELVIS (2 VIEWS)   Final Result   Comminuted intertrochanteric left hip fracture         XR FEMUR LEFT (MIN 2 VIEWS)   Final Result   Left intertrochanteric fracture with displacement. Questionable fracture of the patella. Recommend dedicated left knee   radiographs. XR CHEST PORTABLE   Final Result   No acute findings             Discharge Medications:   Current Discharge Medication List      START taking these medications    Details   docusate sodium (COLACE, DULCOLAX) 100 MG CAPS Take 100 mg by mouth daily  Qty: 30 capsule, Refills: 1      Calcium Carb-Cholecalciferol (CALCIUM-VITAMIN D) 500-200 MG-UNIT per tablet Take 1 tablet by mouth 2 times daily  Qty: 60 tablet, Refills: 3      ferrous sulfate (FE TABS) 325 (65 Fe) MG EC tablet Take 1 tablet by mouth 2 times daily (with meals)  Qty: 90 tablet, Refills: 3      aspirin EC 81 MG EC tablet Take 1 tablet by mouth 2 times daily Please avoid missing doses. Qty: 60 tablet, Refills: 0      oxyCODONE-acetaminophen (PERCOCET) 5-325 MG per tablet Take 1-2 tablets by mouth every 6 hours as needed for Pain for up to 7 days. Qty: 40 tablet, Refills: 0    Comments: Reduce doses taken as pain becomes manageable  Associated Diagnoses: Closed comminuted intertrochanteric fracture of left femur, initial encounter ([facility] [address]  Closed nondisplaced transverse fracture of left patella, initial encounter           Current Discharge Medication List      CONTINUE these medications which have CHANGED    Details   Magnesium Oxide 400 MG CAPS Take 400 mg by mouth daily  Qty: 30 capsule, Refills: 0      metoprolol succinate (TOPROL XL) 25 MG extended release tablet Take 1 tablet by mouth daily  Qty: 30 tablet, Refills: 3           Current Discharge Medication List      CONTINUE these medications which have NOT CHANGED    Details   albuterol sulfate  (90 Base) MCG/ACT inhaler Inhale 2 puffs into the lungs 4 times daily as needed for Wheezing  Qty: 1 Inhaler, Refills: 5      vitamin B-1 (THIAMINE) 100 MG tablet Take 1 tablet by mouth daily  Qty: 90 tablet, Refills: 3      omeprazole (PRILOSEC) 20 MG delayed release capsule Take 1 capsule by mouth daily  Qty: 90 capsule, Refills: 3      folic acid (FOLVITE) 1 MG tablet Take 1 tablet by mouth daily  Qty: 90 tablet, Refills: 1      Multiple Vitamins-Minerals (THERAPEUTIC MULTIVITAMIN-MINERALS) tablet Take 1 tablet by mouth daily  Qty: 30 tablet, Refills: 11           Current Discharge Medication List          Follow-up appointments:  one week    Provider Follow-up:    pcp    Condition at Discharge:  Stable    The patient was seen and examined on day of discharge and this discharge summary is in conjunction with any daily progress note from day of discharge. Time Spent on discharge is 45 minutes  in the examination, evaluation, counseling and review of medications and discharge plan. Signed:    Apurva Briggs DO   1/23/2020      Thank you No primary care provider on file. for the opportunity to be involved in this patient's care. If you have any questions or concerns please feel free to contact me at [phone].

## 2020-02-05 NOTE — PROGRESS NOTES
pelvis and 2 views of the left knee. As this patient has demonstrated risk factors for osteoporosis, such as age greater than [de-identified] years and evidence of a fracture, I have referred the patient back to the primary care physician for evaluation for osteoporosis, including consideration for DEXA scanning, if this is felt to be clinically indicated. The patient is advised to contact the primary care physician to follow-up for further evaluation.        Erin Maxwell, APRN - CNP

## 2020-03-23 NOTE — PLAN OF CARE
Problem: Falls - Risk of:  Goal: Will remain free from falls  Description  Will remain free from falls  Outcome: Ongoing  Goal: Absence of physical injury  Description  Absence of physical injury  Outcome: Ongoing     Problem: Discharge Planning:  Goal: Discharged to appropriate level of care  Description  Discharged to appropriate level of care  Outcome: Ongoing     Problem: Fluid Volume - Deficit:  Goal: Absence of fluid volume deficit signs and symptoms  Description  Absence of fluid volume deficit signs and symptoms  Outcome: Ongoing     Problem: Nutrition Deficit:  Goal: Ability to achieve adequate nutritional intake will improve  Description  Ability to achieve adequate nutritional intake will improve  Outcome: Ongoing     Problem: Sleep Pattern Disturbance:  Goal: Appears well-rested  Description  Appears well-rested  Outcome: Ongoing Pt calling to report this morning's weight to Dr Anders Cee - 159 lbs.

## 2020-04-14 PROBLEM — R55 SYNCOPE AND COLLAPSE: Status: ACTIVE | Noted: 2020-01-01

## 2020-04-14 NOTE — ED NOTES
Fall risk screening completed. Fall risk bracelet applied to patient. Non-skid socks provided and placed on patient. The fall risk is indicated using dome light . Bed alarm on and activated. The call light is within the patient's reach and instructions for use were provided. The bed is in the lowest position with wheels locked. The patient has been advised to notify staff using the call light if there is a need to get up or use restroom. The patient verbalized understanding of safety precautions and how to contact staff for assistance.         Scott Chawla RN  04/14/20 3069

## 2020-04-14 NOTE — PROGRESS NOTES
4 Eyes Admission Assessment     I agree as the admission nurse that 2 RN's have performed a thorough Head to Toe Skin Assessment on the patient. ALL assessment sites listed below have been assessed on admission. Areas assessed by both nurses: yes  [x]   Head, Face, and Ears   [x]   Shoulders, Back, and Chest  [x]   Arms, Elbows, and Hands   [x]   Coccyx, Sacrum, and Ischum  [x]   Legs, Feet, and Heels        Does the Patient have Skin Breakdown?   Blanchable redness to buttocks, R great toe and second toe abrasion, scattered bruising abrasions, broken nose        Tion Prevention initiated:  Yes   Wound Care Orders initiated:  NA      WOC nurse consulted for Pressure Injury (Stage 3,4, Unstageable, DTI, NWPT, and Complex wounds):  NA      Nurse 1 eSignature: Electronically signed by Julian Kathleen RN on 4/14/2020 at 6:18 PM      **SHARE this note so that the co-signing nurse is able to place an eSignature**    Nurse 2 eSignature: Electronically signed by Camilo Mora RN on 4/14/20 at 6:30 PM EDT

## 2020-04-14 NOTE — ED NOTES
Bed: S-42  Expected date: 4/14/20  Expected time: 11:40 AM  Means of arrival: [facility]  Comments:  56M Fall     Coco Avery, [facility]  04/14/20 1157

## 2020-04-14 NOTE — PROGRESS NOTES
Pt arrived to room 3121 from ED. Vital signs taken and were WNL. Admission and assessment completed. Pt oriented to room, bed, phone, and call light. Fall prevention contract reviewed and signed. Fall precautions in place. Call light, bedside table and phone within easy reach. Pt instructed to use call light to call for assistance.   Electronically signed by Diana Isaac RN on 4/14/2020 at 5:59 PM

## 2020-04-15 NOTE — PROGRESS NOTES
[facility] Medicine Progress Note      Admit Date: 4/14/2020         Overnight Events: none    CC: F/U for fall     HPI: The patient Jony Sequeira is a 72 y.o.male with medical history significant for COPD, alcohol abuse, and tobacco abuse. Patient was brought to the emergency room by EMS for complaint of fall and ankle pain. Patient was unable to recall exactly what happened  Patient reports that he woke up this morning on the floor. He believes likely he fell from the bed. Patient had a nosebleed and forehead contusion. Patient denies any fevers chills no history of nausea vomiting or diarrhea no history of hematemesis or melena no history of urinary symptoms. He has had falls before, but doesn't know what from. He is a little illusive when trying to get some history from him. He does normally drink about a pint of alcohol daily but states it has been a couple of weeks since he has drank any alcohol. He denies drug use. He lives alone. He denies any symptoms prior to the fall like SOB, chest pain, dizziness or lightheadedness. He denies any recent illnesses or sick exposure. He says he has not been using his inhalers because they are annoying and cumbersome, but cannot tell me for how long he has not had them. He continues to smoke. Facial bone CT showed impacted comminuted nasal bone fractures. CT L-spine shows acute on chronic left 7th through 9th rib fractures. Multiple T spine compression fractures. No evidence intraabdominal injury. Left intertrochanteric fracture with lucent areas which could represent incomplete healing of previous fracture vs. Acute on chronic. Mile loss of ht at L5 new from prior studies indicate acute compression fx. Neurosurgery is consulted and there is no surgical intervention at this time indicated. Patient may benefit from TLSO back brace. PT/OT. Pain management.      Interval History/Subjective: doing ok.      Review of Systems:       The patient facial asymmetry, tongue midline. Neg pronator drift, DTR +2 bilat LEs patellar   Psychiatric:  Alert and oriented, thought content appropriate  Capillary Refill: Brisk,< 3 seconds   Peripheral Pulses: +2 palpable, equal bilaterally     LABS:    Lab Results   Component Value Date    WBC 3.9 (L) 04/15/2020    HGB 13.3 (L) 04/15/2020    HCT 39.8 (L) 04/15/2020    MCV 98.9 04/15/2020    PLT 76 (L) 04/15/2020    LYMPHOPCT 17.5 04/14/2020    RBC 4.02 (L) 04/15/2020    MCH 33.0 04/15/2020    MCHC 33.3 04/15/2020    RDW 17.6 (H) 04/15/2020       Lab Results   Component Value Date    CREATININE <0.5 (L) 04/15/2020    BUN 9 04/15/2020     (L) 04/15/2020    K 3.4 (L) 04/15/2020    CL 98 (L) 04/15/2020    CO2 23 04/15/2020       Lab Results   Component Value Date    MG 1.60 04/15/2020       Lab Results   Component Value Date    ALT 17 04/14/2020    AST 36 04/14/2020    ALKPHOS 125 04/14/2020    BILITOT 0.8 04/14/2020        No flowsheet data found. Lab Results   Component Value Date    LABA1C 5.0 04/01/2019       Imaging:  CT CHEST ABDOMEN PELVIS W CONTRAST   Final Result   No acute pulmonary process. Acute on chronic left 7th through 9th rib fractures. Multiple thoracic spine compression fractures. Refer to dedicated CT of the   thoracic spine report. No evidence of intra-abdominal injury. Left intertrochanteric fracture with lucent areas which could represent   incomplete healing of previous fracture versus an acute-on-chronic fracture. Mild loss of height at L5 is new from prior studies and may indicate an acute   compression fracture. CT LUMBAR SPINE TRAUMA RECONSTRUCTION   Final Result   No acute pulmonary process. Acute on chronic left 7th through 9th rib fractures. Multiple thoracic spine compression fractures. Refer to dedicated CT of the   thoracic spine report. No evidence of intra-abdominal injury.       Left intertrochanteric fracture with lucent areas which could represent   incomplete healing of previous fracture versus an acute-on-chronic fracture. Mild loss of height at L5 is new from prior studies and may indicate an acute   compression fracture. CT FACIAL BONES WO CONTRAST   Preliminary Result   Impacted comminuted nasal bone fractures. Fluid levels in the left maxillary and left frontal sinuses. Imaging occult   fractures are not excluded but felt to be unlikely given the lack of focal   swelling in these locations. These may be reactive or related to an acute   sinus disease. CT Head WO Contrast   Preliminary Result   No acute intracranial abnormality. No acute cervical spine abnormality. CT Cervical Spine WO Contrast   Preliminary Result   No acute intracranial abnormality. No acute cervical spine abnormality. CT CHEST WO CONTRAST   Final Result   Mild right bibasilar dependent atelectasis. No acute traumatic intrathoracic   abnormality identified. Small scattered centrilobular nodules suggestive of hypersensitivity   pneumonitis or respiratory bronchiolitis. The ascending aorta is mildly dilated measuring 4 cm and there is   atherosclerotic calcification of the coronary arteries. CT THORACIC SPINE TRAUMA RECONSTRUCTION   Preliminary Result   Multiple thoracic spine compression fractures involving T5, T7, T11, L1, L2,   and likely L3. Greatest height loss in the lower thoracic vertebral bodies with retropulsion   into the canal as above. XR HIP 2-3 VW W PELVIS LEFT   Preliminary Result   No acute osseous abnormality. Given the degree of osteopenia, nondisplaced fractures may be   radiographically occult. If pain or concern for fracture persists, consider   MR imaging.          MRI LUMBAR SPINE WO CONTRAST    (Results Pending)   MRI THORACIC SPINE WO CONTRAST    (Results Pending)       Scheduled and prn Medications:    Scheduled Meds:   calcium-vitamin D  1 tablet likely 2/2 alcholism  - H/H 13/29.8  - takes iron   - stable     COPD with bronchiolitis on CT  Tobacco abuse   - Nicotine patch and counseling  - duonebs PRN  - consult Pulm      mild hypokalemia, hyponatremia, hypomagnesemia and hypochloridemia likely 2/2 alcholism  - trend labs  - replace per protocol   - telemetry       Facial injury/closed head injury  - CT face: impacted comminuted nasal bone fractures   - no breathing difficulties  - consult ENT    Thrombocytopenia  - hold lovenox     Continue current regimen/therapies. Monitor. Adjust medical regimen as appropriate. Body mass index is 22.73 kg/m². The patient and / or the family were informed of the results of any tests, a time was given to answer questions, a plan was proposed and they agreed with plan.       DVT prophylaxis: [x] Lovenox  [] SQ Heparin  [] SCDs because of  [] warfarin/oral direct thrombin inhibitor [] Encourage ambulation    GI prophylaxis: [x] [id number]  [] not indicated    Probiotic if on abx: [] Yes [] No [x] Not Indicated    Diet: DIET GENERAL;    Consults:  IP CONSULT TO HOSPITALIST  IP CONSULT TO NEUROSURGERY  IP CONSULT TO HOSPITALIST      Disposition:  [x] Home  [] Home with home health [] Rehab [] Psych [] SNF  [] LTAC  [] Long term nursing home or group home [] Transfer to ICU  [] Transfer to PCU [] Other:    Code Status: Full Code    ELOS: 2-3 days      AMPARO Holt - REENA  04/15/20

## 2020-04-15 NOTE — PROGRESS NOTES
Patient is alert and oriented x4, in bed awake, call light within reach, bed/chair alarm on. AM meds complete with exception of metoprolol due to low pressure 93/58, waiting for response from MD for parameters of med, patient tolerated other meds  well. No s/s of distress or pain noted, patient to go for MRI today in AM. no further needs noted at this time.  Electronically signed by Dominick Navarrete RN on 4/15/2020 at 10:24 AM

## 2020-04-15 NOTE — CARE COORDINATION
INITIAL CASE MANAGEMENT ASSESSMENT    Reviewed chart, met with patient to assess possible discharge needs. Explained Case Management role/services. Living Situation: confirmed address, lives alne in an apt w/ 4 steps to enter and then 6 steps to get to apt    ADLs: he states that he is independent     DME: walker, w/c, cane , shr seat, grab bar in the shr    PT/OT Recs: not currently ordered     Active Services: he tells me that he has had [facility] PT for a year. He does not know the provider. [facility] program, gets [employer]'s     Transportation: he does not drive. He believes that \"aunt Leny\" [phone] could transport at dc but she works Tues and Fri. She takes him to MD appts and to grocery. Medications: confirmed [employer], states rx are covered and obtained at [facility] on [address]    PCP: Dr Ying Robison      HD/PD: n/a    PLAN/COMMENTS: hx ETOH 1 pint/ day. Pt has 2 black eyes. Will monitor ongoing for dc needs    SW/CM provided contact information for patient or family to call with any questions. SW/CM will follow and assist as needed.   Electronically signed by Maldonado Mcneil RN on 4/15/2020 at 1:56 PM

## 2020-04-15 NOTE — PROGRESS NOTES
Patient resting in bed. Patient wants to keep his shirt from home on. Calm and cooperative. Complaining of pain in his nose and hip, but doesn't want anything for the pain at this time. Fall precautions are in place. Call light is in reach. Will continue to monitor.      Electronically signed by Tamra Agarwal RN on 4/14/2020 at 9:18 PM

## 2020-04-16 NOTE — PROGRESS NOTES
Objective   Vision: Within Functional Limits  Hearing: Within functional limits    Orientation  Overall Orientation Status: Within Functional Limits     Balance  Sitting Balance: Stand by assistance  Standing Balance: Contact guard assistance  Functional Mobility  Functional - Mobility Device: Rolling Walker  Activity: Other  Assist Level: Contact guard assistance  Functional Mobility Comments: no LOB, pt ambulated in hallway favoring LLE due to pain \"I broke my hip\", CGA provided and cues for walker safety needed  ADL  Toileting: Stand by assistance  Tone RUE  RUE Tone: Normotonic  Tone LUE  LUE Tone: Normotonic  Coordination  Movements Are Fluid And Coordinated: Yes     Bed mobility  Supine to Sit: Unable to assess(not tested, up in chair before and after session)  Sit to Supine: Unable to assess  Transfers  Sit to stand: Contact guard assistance  Stand to sit: Contact guard assistance     Cognition  Overall Cognitive Status: WFL                 LUE AROM (degrees)  LUE AROM : WFL  RUE AROM (degrees)  RUE AROM : WFL  LUE Strength  Gross LUE Strength: WFL  RUE Strength  Gross RUE Strength: WFL                   Plan   Plan  Times per week: 2-3  Times per day: Daily  Plan weeks: 1  Current Treatment Recommendations: Balance Training, Patient/Caregiver Education & Training, Self-Care / ADL, Safety Education & Training, Functional Mobility Training, Equipment Evaluation, Education, & procurement, Home Management Training, Endurance Training         OutComes Score          Yamilka Pemberton scored a 21/24 on the AM-PAC ADL Inpatient form. Current research shows that an AM-PAC score of 18 or greater is typically associated with a discharge to the patient's home setting. Based on the patients AM-PAC score and their current ADL deficits, it is recommended that the patient have 2-3 sessions per week of Occupational Therapy at d/c to increase the patients independence.     If patient discharges prior to next session

## 2020-04-16 NOTE — PROGRESS NOTES
Patient in bed eyes closed. No complaints at this time. Shift uneventful. Call light within reach, able to make needs knows, fall precautions in place. Will continue to monitor. Electronically signed by Belen Calzada RN on 4/16/2020 at 6:22 AM

## 2020-04-16 NOTE — PROGRESS NOTES
Patient in bed eyes closed. Patient tolerating PO intake well. PM medications given without complications. Patient pain is managed with prn pain medications, denies nausea or vomiting. Call light within reach, fall precautions in place. Will monitor. Electronically signed by Mike Conn RN on 4/16/2020 at 1:26 AM

## 2020-04-16 NOTE — PROGRESS NOTES
Patient took chair alarm off and got self back in bed without assistance or calling for help. Patient alert to self and time only. Telecamera placed in room. Repositioned in bed. Bed alarm on. Call light and belongings within reach. Patient educated on using call light when needing assistance. Will continue to monitor.

## 2020-04-16 NOTE — PROGRESS NOTES
History/Subjective: IVF for hyponatremia, Mg and K replaced, ENT consulted for nasal fx. -- Dr. Gabby Morris saw and without deviation of nasal bones but chronic septal deviation. No operative intervention at this time. Nasal saline frequently . Pulm consulted for bronchioltis, nodules hx COPD -- calcified nodules and lymph nodes. Incentive spirometry. No other intervention. Review of Systems:       The patient denied headaches, visual changes, LOC, SOB, CP, ABD pain, N/V/D, skin changes, new or worsening weakness or neuromuscular deficits. Comprehensive ROS negative except as mentioned above. Past Medical History:        Diagnosis Date    Alcohol abuse     COPD (chronic obstructive pulmonary disease) ([facility] [address]     Tobacco abuse        Past Surgical History:        Procedure Laterality Date    HIP FRACTURE SURGERY Left 1/20/2020    LEFT HIP GAMMA NAIL performed by Urbano Boudreaux MD at [address] [place] [address]:  Patient has no known allergies. Past medical and surgical history reviewed. Any changes have been noted. PHYSICAL EXAM:  /70   Pulse 103   Temp 98.5 °F (36.9 °C) (Oral)   Resp 16   Ht 6' 3\" (1.905 m)   Wt 179 lb 7.3 oz (81.4 kg)   SpO2 95%   BMI 22.43 kg/m²       Intake/Output Summary (Last 24 hours) at 4/16/2020 1136  Last data filed at 4/16/2020 0955  Gross per 24 hour   Intake 720 ml   Output 2550 ml   Net -1830 ml     General appearance:   Appears unkept, No apparent distress, appears stated age. Cooperative. HEENT: ecchymosis and edema nasal bone and underneath bilat eyes, Normocephalic, PERRLA. EOMi. Conjunctivae/corneas clear, no icterus, non-injected. Neck: Supple, with full range of motion. No jugular venous distention. Trachea midline. Respiratory:  Normal respiratory effort. Clear to auscultation, bilaterally without Rales/Wheezes/Rhonchi. Cardiovascular:  Regular rate and rhythm without murmurs, rubs or gallops.   Abdomen: Soft, non-tender, non-distended, acetaminophen, polyethylene glycol, promethazine **OR** ondansetron    Assessment & Plan:      Thoracic and lumbar spine compression fractures 2/2 trauma (fall)  Remote compression fxs L1 most notably and throughout lumbar spine  - no acute L spine fx, moderate spinal canal stenosis at L1 2/2 retroulsion of posterior margin at L1 vertebral body into spinal canal, mild multilevel degen changes L spine  - may benefit from TLSO brace  - pain control PRN  - muscle relaxers  - EKG: NSR without arrythmia   Neurosurgery is consulted - no intervention at this time  Essential primary hypertension  Continue patient on home medication     Bronchiolitis on CT  Hx COPD  Tobacco abuse   - Nicotine patch and counseling  - duonebs PRN  - consult Pulm -- nodules are calcified w/calcified lymph nodes 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 I/S while in bed, no intervention  - CT chest : mild right bibasilar dependent atelectasis, small scattered centrilobular nodules suggesting pneumonitis or respiratory bronchiolitis.  Ascending aorta mildly dilated measuring 4cm and atherosclerotic calcification of coronary arteries     Fall w/unknown etiology and syncopal episode  - Echo pending  - BP stable and normal  - last echo 5/30/19: est EF 40% mild MR  - head and c-spine CT neg  - Consider Consulting Cardiology if echo worse     Hx Alcohol abuse  Monitor for withdrawal symptoms, although not likely since last drink 2 wks ago  - banana bag, thiamine , folic acid  - folate + B12 normal  - thiamine and folate daily     Chronic iron defic anemia likely 2/2 alcholism  - H/H 13/29.8  - takes iron   - stable        mild hypokalemia, hyponatremia, hypomagnesemia and hypochloridemia likely 2/2 alcholism  - trend labs  - replace per protocol   - telemetry        Facial injury/closed head injury  - CT face: impacted comminuted nasal bone fractures   - no breathing difficulties  - consult ENT - Dr. Bonita Ny saw and without deviation of nasal bones but chronic septal deviation. No operative intervention at this time. Nasal saline frequently .       Thrombocytopenia  - hold lovenox        Continue current regimen/therapies. Monitor. Adjust medical regimen as appropriate. Body mass index is 22.43 kg/m². The patient and / or the family were informed of the results of any tests, a time was given to answer questions, a plan was proposed and they agreed with plan.     DVT prophylaxis: [x] Lovenox  [] SQ Heparin  [] SCDs because of  [] warfarin/oral direct thrombin inhibitor [] Encourage ambulation    GI prophylaxis: [x] [id number]  [] not indicated    Probiotic if on abx: [] Yes [] No [x] Not Indicated    Diet: DIET GENERAL;    Consults:  IP CONSULT TO HOSPITALIST  IP CONSULT TO NEUROSURGERY  IP CONSULT TO HOSPITALIST  IP CONSULT TO OTOLARYNGOLOGY  IP CONSULT TO PULMONOLOGY    Disposition:  [] Home  [] Home with home health [x] Rehab [] Psych [] SNF  [] LTAC  [] Long term nursing home or group home [] Transfer to ICU  [] Transfer to PCU [] Other:    Code Status: Full Code    ELOS: 1-2 days pending placement    AMPARO Lambert CNP  04/16/20

## 2020-04-16 NOTE — CONSULTS
REASON FOR CONSULTATION/CC: pulmonary nodules / atelectesis      Consult at request of Marta Mae MD for nodules    PCP: No primary care provider on file. Established Pulmonologist:  None    HISTORY OF PRESENT ILLNESS: Lona Bucio is a 72y.o. year old male with a history of copd who presents with :     Patient presented to the emergency room yesterday with complaints of ankle pain after waking up in the floor. The patient has no memory of what happened. he believes he fell out of bed. Trauma work-up was completed and found to have a T5 f vertebral fracture. Rib fractures also found to be subacute versus chronic. Neurosurgery was consulted. He has copd but denies needing any meds or having significant symptoms. PAST MEDICAL HISTORY:  Past Medical History:   Diagnosis Date    Alcohol abuse     COPD (chronic obstructive pulmonary disease) ([facility] [address]     Tobacco abuse        PAST SURGICAL HISTORY:  Past Surgical History:   Procedure Laterality Date    HIP FRACTURE SURGERY Left 1/20/2020    LEFT HIP GAMMA NAIL performed by Christianne Young MD at [facility]:  family history includes Breast Cancer in his sister; COPD in his father; Cancer in his brother; Other in his mother. SOCIAL HISTORY:   reports that he has been smoking cigarettes. He has a 27.50 pack-year smoking history.  He has never used smokeless tobacco.    Scheduled Meds:   nicotine  1 patch Transdermal Daily    calcium-vitamin D  1 tablet Oral BID    docusate sodium  100 mg Oral Daily    ferrous sulfate  324 mg Oral BID WC    magnesium oxide  400 mg Oral Daily    metoprolol succinate  25 mg Oral Daily    pantoprazole  40 mg Oral QAM AC    vitamin B-1  100 mg Oral Daily    sodium chloride flush  10 mL Intravenous 2 times per day    folic acid  1 mg Oral Daily       Continuous Infusions:   sodium chloride 100 mL/hr at 04/16/20 0336       PRN Meds:  potassium chloride **OR** potassium alternative oral replacement **OR** potassium chloride, traMADol, methocarbamol, ipratropium-albuterol, albuterol sulfate HFA, sodium chloride flush, acetaminophen **OR** acetaminophen, polyethylene glycol, promethazine **OR** ondansetron    ALLERGIES:  Patient has No Known Allergies. REVIEW OF SYSTEMS:  Constitutional: Negative for fever   HENT: Negative for sore throat  Eyes: Negative for redness   Respiratory: Negative for dyspnea, cough  Cardiovascular: + for chest pain. From trauma  Gastrointestinal: Negative for vomiting, diarrhea   Genitourinary: Negative for hematuria   Musculoskeletal: Negative for arthralgias   Skin: Negative for rash  Neurological: Negative for syncope  Hematological: Negative for adenopathy  Psychiatric/Behavorial: Negative for anxiety    Objective:   PHYSICAL EXAM:  Blood pressure 104/60, pulse 81, temperature 98.5 °F (36.9 °C), resp. rate 17, height 6' 3\" (1.905 m), weight 179 lb 7.3 oz (81.4 kg), SpO2 100 %.'  Gen: No distress. Eyes: PERRL. No sclera icterus. No conjunctival injection. ENT: No discharge. Pharynx clear. External appearance of ears and nose normal. Bruising on face, under eyes, nose with laceration. Neck: Trachea midline. No obvious mass. Resp: No accessory muscle use. No crackles. No wheezes. No rhonchi. CV: Regular rate. Regular rhythm. No murmur or rub. No edema. GI: Non-tender. Non-distended. No hernia. Skin: Warm, dry, normal texture and turgor. No nodule on exposed extremities. Lymph: No cervical LAD. No supraclavicular LAD. M/S: No cyanosis. No clubbing. No joint deformity. Neuro: Moves all four extremities. Psych: Oriented x 3. No anxiety. Awake. Alert. Intact judgement and insight.       Data Reviewed:   LABS:  CBC:   Recent Labs     04/14/20  1246 04/15/20  0504   WBC 4.3 3.9*   HGB 13.9 13.3*   HCT 41.6 39.8*   MCV 97.6 98.9   * 76*     BMP:   Recent Labs     04/14/20  1246 04/15/20  0503    135*   K 4.4 3.4*   CL 96* 98*   CO2 22 23   BUN 10 9   CREATININE <0.5* <0.5*     LIVER PROFILE:   Recent Labs     04/14/20  1246   AST 36   ALT 17   BILITOT 0.8   ALKPHOS 125     PT/INR: No results for input(s): PROTIME, INR in the last 72 hours. APTT: No results for input(s): APTT in the last 72 hours. UA:  Recent Labs     04/14/20  1245   COLORU DK YELLOW   PHUR 6.0   WBCUA 1   RBCUA 2   CLARITYU Clear   SPECGRAV 1.027   LEUKOCYTESUR Negative   UROBILINOGEN 1.0   BILIRUBINUR Negative   BLOODU Negative   GLUCOSEU Negative     No results for input(s): PHART, FOB3YPS, PO2ART in the last 72 hours. Vent Information  SpO2: 100 %    Radiology Review:  Pertinent images / reports were reviewed as a part of this visit. CT Chest w/ contrast: No results found for this or any previous visit. CT Chest w/o contrast:   Results for orders placed during the hospital encounter of 04/14/20   CT CHEST WO CONTRAST    Narrative EXAMINATION:  CT OF THE CHEST WITHOUT CONTRAST 4/14/2020 12:49 pm    TECHNIQUE:  CT of the chest was performed without the administration of intravenous  contrast. Multiplanar reformatted images are provided for review. Dose  modulation, iterative reconstruction, and/or weight based adjustment of the  mA/kV was utilized to reduce the radiation dose to as low as reasonably  achievable. COMPARISON:  CT thoracic spine same date. HISTORY:  ORDERING SYSTEM PROVIDED HISTORY: fall  TECHNOLOGIST PROVIDED HISTORY:  Reason for exam:->fall    FINDINGS:  Mediastinum: There is no evidence of mediastinal hematoma or great vessel  injury on noncontrast evaluation. There is atherosclerotic calcification of  the coronary arteries. The ascending aorta is dilated measuring up to 4 cm. The heart, pericardium, and great vessels are without acute process on  noncontrast exam.    Lungs/pleura: The central airways are patent. No pneumothorax or pleural  effusion identified. There is no focal airspace consolidation.   There is  mild right basilar dependent atelectasis. There few scattered calcified  granulomas. There are small 1 cm central lobular nodules. No suspicious  nodules or masses are identified. Upper abdomen: Limited noncontrast evaluation of the upper abdomen is without  acute process. Bones/Soft Tissues: There is a 6 x 3 cm well-circumscribed low-attenuation  lesion in the lower anterior chest just anterior to the manubrium, likely  related to a large sebaceous cyst.  Multiple remote appearing bilateral  posterior rib fractures are seen there is deformity of both clavicular heads,  left greater than right, likely related to prior trauma. See CT thoracic  spine for dedicated spine findings. Impression Mild right bibasilar dependent atelectasis. No acute traumatic intrathoracic  abnormality identified. Small scattered centrilobular nodules suggestive of hypersensitivity  pneumonitis or respiratory bronchiolitis. The ascending aorta is mildly dilated measuring 4 cm and there is  atherosclerotic calcification of the coronary arteries. CTPA: No results found for this or any previous visit. CXR PA/LAT:   Results for orders placed during the hospital encounter of 12/21/19   XR CHEST STANDARD (2 VW)    Narrative EXAMINATION:  TWO XRAY VIEWS OF THE CHEST    12/21/2019 4:14 pm    COMPARISON:  06/29/2019    HISTORY:  ORDERING SYSTEM PROVIDED HISTORY: ams  TECHNOLOGIST PROVIDED HISTORY:  Reason for exam:->ams  Reason for Exam: ams  Acuity: Acute  Type of Exam: Initial  Additional signs and symptoms: Altered Mental Status (Found by neighbor, not  acting his normal. Pt able to answer some questions and follow some commands.  )    FINDINGS:  Heart size normal with prominence of the left ventricular contour. Thoracic  aorta tortuous. Lungs clear. Costophrenic angles sharp.   Multiple old rib  fractures noted      Impression No active cardiopulmonary disease         CXR portable:   Results for orders placed during the hospital

## 2020-04-16 NOTE — CONSULTS
medical decision making:  Independent review of images  Review / order clinical lab tests  Review / order radiology tests  Decision to obtain old records

## 2020-04-17 NOTE — PROGRESS NOTES
Occupational Therapy  Facility/Department: [address] ORTHOPEDICS  Daily Treatment Note  NAME: Ryan Fraser  : 1950  MRN: [id number]    Date of Service: 2020    Discharge Recommendations:  3-5 sessions per week     Ryan Fraser scored a 17/24 on the AM-PAC ADL Inpatient form. Current research shows that an AM-PAC score of 17 or less is typically not associated with a discharge to the patient's home setting. Based on the patients AM-PAC score and their current ADL deficits, it is recommended that the patient have 3-5 sessions per week of Occupational Therapy at d/c to increase the patients independence. Assessment: Discussed with OTR am pac score is 17 which indicates need for continued skilled OT to increase Nance to return to Mod I level in home setting. Patient requires CGA/Min A for transfers with cues for hand placement and safety. Functional mobility with RW with Min A with 2 LOB noted with Min A to recover. Patient at this time is unsafe to return home, is a high fall risk and is at risk for further injury. Cont with POC. Patient Diagnosis(es): The primary encounter diagnosis was Compression fracture of T5 vertebra, sequela. Diagnoses of Fall from bed, sequela and Syncope and collapse were also pertinent to this visit. has a past medical history of Alcohol abuse, COPD (chronic obstructive pulmonary disease) ([address] and Tobacco abuse.   has a past surgical history that includes Hip fracture surgery (Left, 2020). Restrictions  Restrictions/Precautions  Restrictions/Precautions: Fall Risk  Position Activity Restriction  Other position/activity restrictions: IV  Subjective   General  Chart Reviewed: Yes  Additional Pertinent Hx: Per Dr. Shaka Devlin, \"The patient Ryan Fraser is a 72 y.o.male with medical history significant for COPD, alcohol abuse, and tobacco abuse. Patient reports that he woke up this morning on the floor.   He believes likely he fell from the bed.  Patient had a nosebleed and forehead contusion. Patient denies any fevers chills no history of nausea vomiting or diarrhea no history of hematemesis or melena no history of urinary symptoms \"  Response to previous treatment: Patient with no complaints from previous session  Family / Caregiver Present: No  Referring Practitioner: Howie Carpio CNP  Diagnosis: s/p fall with nasal bone fx and septum fx, remote T5 vertebral fx and rib fxs. Subjective  Subjective: Patient supine in bed upon arrival to room, eating breakfast. Patient without c/o's pain      Orientation  Orientation  Overall Orientation Status: Impaired  Orientation Level: Oriented to place;Oriented to person;Disoriented to time;Disoriented to situation  Objective    ADL  Grooming: Contact guard assistance(CGA for standing balance to wash hands)  LE Dressing: Moderate assistance(Mod A to thread briefs over feet, Min A for balance to don over hips)  Toileting: Minimal assistance(Incont of urine, unaware briefs soiled, Min A for balance to clean vasiliy area)        Balance  Sitting Balance: Stand by assistance  Standing Balance: Contact guard assistance  Standing Balance  Activity: Static standing with RW for ADL tasks  Functional Mobility  Functional - Mobility Device: Rolling Walker  Activity: Other  Assist Level: Minimal assistance  Functional Mobility Comments: Functioanl mobility with RW with Min A, 2 LOB noted with Min A to recover.  Requires cues for walker safety   Toilet Transfers  Toilet - Technique: Ambulating  Equipment Used: Grab bars(comfort height commode)  Toilet Transfer: Contact guard assistance;Minimal assistance  Toilet Transfers Comments: cues for hand placement   Bed mobility  Supine to Sit: Stand by assistance(HOB elevated and side rail)  Sit to Supine: Unable to assess(up in chair at end of session)  Transfers  Sit to stand: Contact guard assistance;Minimal assistance  Stand to sit: Contact guard assistance;Minimal assistance  Transfer Comments: Sit <> stand to RW with CGA/Min A with cues for hand placement. Transfer to recliner chair with CGA/Min A with cues for hand placement and safety      Cognition  Overall Cognitive Status: Exceptions  Following Commands: Follows one step commands consistently; Follows multistep commands with increased time  Memory: Decreased recall of recent events;Decreased short term memory  Safety Judgement: Decreased awareness of need for assistance  Insights: Decreased awareness of deficits  Initiation: Requires cues for some  Sequencing: Requires cues for some       Assessment   Performance deficits / Impairments: Decreased functional mobility ; Decreased high-level IADLs;Decreased ADL status; Decreased endurance;Decreased balance  Assessment: Discussed with OTR am pac score is 17 which indicates need for continued skilled OT to increase Ada to return to Mod I level in home setting. Patient requires CGA/Min A for transfers with cues for hand placement and safety. Functional mobility with RW with Min A with 2 LOB noted with Min A to recover. Patient at this time is unsafe to return home, is a high fall risk and is at risk for further injury. Cont with POC. OT Education: OT Role;Plan of Care;ADL Adaptive Strategies; Equipment;Transfer Training  REQUIRES OT FOLLOW UP: Yes  Activity Tolerance  Activity Tolerance: Patient Tolerated treatment well  Safety Devices  Safety Devices in place: Yes  Type of devices: Call light within reach; Chair alarm in place;Nurse notified; Left in chair        Plan   Plan  Times per week: 2-3  Times per day: Daily  Plan weeks: 1  Current Treatment Recommendations: Balance Training, Patient/Caregiver Education & Training, Self-Care / ADL, Safety Education & Training, Functional Mobility Training, Equipment Evaluation, Education, & procurement, Home Management Training, Endurance Training    AM-PAC Score        AM-PAC Inpatient Daily Activity Raw Score: 17 (04/17/20

## 2020-04-17 NOTE — PROGRESS NOTES
Pt refusing to keep tele leads on, climbing OOB, yelling out, pulling at tubing. This RN notified June Brink of Pt refusal and sent urgent message to NP to report Pt behavior.

## 2020-04-17 NOTE — PROGRESS NOTES
Pt returned to bed this evening, tolerating PO, having some episodes of incontinence. Pt A&Ox3-4, telecam remains in place. Pt needing further PT/OT, sw following for SNF placement. Will continue to monitor.  Electronically signed by Memo Mcgraw RN on 4/17/2020 at 3:48 PM

## 2020-04-17 NOTE — PROGRESS NOTES
Physical Therapy    Facility/Department: [address] ORTHOPEDICS  Initial Assessment  This note serves as patient discharge summary if pt discharges prior to next PT visit      NAME: Toro Rizo  : 1950  MRN: [id number]    Date of Service: 2020    Discharge Recommendations:  3-5 sessions per week   Toro Rizo scored a 17/24 on the AM-PAC short mobility form. Current research shows that an AM-PAC score of 17 or less is typically not associated with a discharge to the patient's home setting. Based on the patients AM-PAC score and their current functional mobility deficits, it is recommended that the patient have 3-5 sessions per week of Physical Therapy at d/c to increase the patients independence. PT Equipment Recommendations  Equipment Needed: No    Assessment   Body structures, Functions, Activity limitations: Decreased functional mobility ; Decreased strength;Decreased safe awareness;Decreased balance  Assessment: 73 yo male to hospital via EMS after waking up on the floor at home. Reporting ankle pain. Unable to provide history. Patient with forehead contusion and nosebleed. Work up reveals \"Impacted comminuted nasal bone fractures. \" \"Acute on chronic left 7th through 9th rib fractures. \" \"Left intertrochanteric fracture with lucent areas which could represent incomplete healing of previous fracture versus an acute-on-chronic fracture. \" \"Moderate spinal canal stenosis at the L1 level. \" \"Remote compression fractures throughout the lumbar spine, most notably at L1.\"  \"Acute/subacute T2 compression fracture demonstrating 10% loss of height of the superior endplate of O7.\" \"Remote compression fractures at T5, T7, T11 and L1.\"  \"Mild spinal canal stenosis at T7-8 secondary to a disc bulge. \"  PMHx as noted, including COPD, alcohol abuse, and tobacco abuse. Also with repair of L proximal femur inter/subtrochanteric comminuted 4 part fracture 2020. \"  Prior status: Alone in an apartment with incomplete healing of previous fracture versus an acute-on-chronic fracture. \" \"Moderate spinal canal stenosis at the L1 level. \" \"Remote compression fractures throughout the lumbar spine, most notably at L1.\"  \"Acute/subacute T2 compression fracture demonstrating 10% loss of height of the superior endplate of Y9.\" \"Remote compression fractures at T5, T7, T11 and L1.\"  \"Mild spinal canal stenosis at T7-8 secondary to a disc bulge. \"  PMHx as noted, including COPD, alcohol abuse, and tobacco abuse. Also with repair of L proximal femur inter/subtrochanteric comminuted 4 part fracture 1-. \"  Response To Previous Treatment: Not applicable  Family / Caregiver Present: No(not currently permitted)  Referring Practitioner: Dash Mcdermott CNP  Referral Date : 04/16/20  Subjective  Subjective: Sitting in BS chair finishing lunch. Agreeable to therapy. Reports LBP of 5/10 during mobility. Pain Screening  Patient Currently in Pain: Yes    Orientation  Orientation  Overall Orientation Status: Within Functional Limits(but seems unclear about incidents leading to this admit. )     Social/Functional History  Social/Functional History  Lives With: Alone  Type of Home: Apartment  Home Layout: One level, Laundry in basement  Home Access: Stairs to enter with rails  Entrance Stairs - Number of Steps: 4 and 6  Entrance Stairs - Rails: Right  Bathroom Shower/Tub: Tub/Shower unit, Curtain, Shower chair with back  Bathroom Toilet: Standard  Bathroom Equipment: Shower chair, Grab bars in shower  Home Equipment: Rolling walker, Cane, Alert [employer] Help From: Family, Home health  ADL Assistance: Independent(receives MOWs.  4-: reports if he has trouble with ADLs, calls his neighbor Edmund Craig. )  [address]: Needs assistance(3x week aide completes cleaning and laundry)  Homemaking Responsibilities: Yes  Ambulation Assistance: Independent(4-17: reports using his wh walker.)  Transfer Assistance: Independent  Active : No  Patient's  Info: Aunt ERASMO   Mode of Transportation: Car  Leisure & Hobbies: watch TV, listen to music, visit friends, go for drives  Additional Comments: multiple falls, had HHPT currently     Cognition   Cognition  Overall Cognitive Status: Exceptions  Following Commands: Follows one step commands consistently; Follows multistep commands with increased time  Memory: Decreased recall of recent events;Decreased short term memory  Safety Judgement: Decreased awareness of need for assistance  Insights: Decreased awareness of deficits  Initiation: Requires cues for some  Sequencing: Requires cues for some    Objective  AROM RLE (degrees)  RLE AROM: WFL  AROM LLE (degrees)  LLE AROM : WFL  Strength RLE  Strength RLE: WFL  Strength LLE  Strength LLE: WFL  Comment: although functional hip extension 3+/5, with patient reporting L LE pain. Sensation  Overall Sensation Status: WFL  Bed mobility  Supine to Sit: Stand by assistance(per OT, earlier session. HOB up, 1 rail)  Comment: Patient in BS chair at start and end of session. Transfers  Sit to Stand: Contact guard assistance(cues for hand placement)  Stand to sit: Contact guard assistance(cues for hand placement. Poor eccentric control)  Ambulation  Ambulation?: Yes  Ambulation 1  Surface: level tile  Device: Rolling Walker  Assistance: Contact guard assistance;Minimal assistance  Quality of Gait: Mod forward flexed trunk and forward head, step through pattern. Positions self progressively further behind wh walker base despite therapist assist and cues to reposition  walker correctly to patient. Wide base of support. Distance: 48' x 2. Standing rest break x 3 minutes.    Stairs/Curb  Stairs?: No  Balance  Posture: Fair  Sitting - Static: Good  Sitting - Dynamic: Good  Standing - Static: Good(at RW)  Standing - Dynamic: Good;-(@ RW)  Exercises  Straight Leg Raise: AAROM B , x 10  Knee Long Arc Quad: x 10 B  Ankle Pumps: x 10

## 2020-04-17 NOTE — PROGRESS NOTES
No flowsheet data found. Lab Results   Component Value Date    LABA1C 5.0 04/01/2019       Imaging:  MRI LUMBAR SPINE WO CONTRAST   Final Result   1. Remote compression fractures throughout the lumbar spine, most notably at   L1.   2. No acute fracture of the lumbar spine evident. 3. Moderate spinal canal stenosis at the L1 level secondary to retropulsion   of the posterior margin at the L1 vertebral body into the spinal canal,   unchanged. 4. Mild multilevel degenerative changes of the lumbar spine, as described   above. MRI THORACIC SPINE WO CONTRAST   Final Result   1. Acute/subacute T2 compression fracture demonstrating 10% loss of height of   the superior endplate of T2.   2. Remote compression fractures at T5, T7, T11 and L1.   3. Mild spinal canal stenosis at T7-8 secondary to a disc bulge. 4. Moderate spinal canal stenosis at L1 secondary to retropulsion of   posterior margin of the L1 vertebral body into the spinal canal, unchanged. CT CHEST ABDOMEN PELVIS W CONTRAST   Final Result   No acute pulmonary process. Acute on chronic left 7th through 9th rib fractures. Multiple thoracic spine compression fractures. Refer to dedicated CT of the   thoracic spine report. No evidence of intra-abdominal injury. Left intertrochanteric fracture with lucent areas which could represent   incomplete healing of previous fracture versus an acute-on-chronic fracture. Mild loss of height at L5 is new from prior studies and may indicate an acute   compression fracture. CT LUMBAR SPINE TRAUMA RECONSTRUCTION   Final Result   No acute pulmonary process. Acute on chronic left 7th through 9th rib fractures. Multiple thoracic spine compression fractures. Refer to dedicated CT of the   thoracic spine report. No evidence of intra-abdominal injury.       Left intertrochanteric fracture with lucent areas which could represent   incomplete healing of previous fracture versus an acute-on-chronic fracture. Mild loss of height at L5 is new from prior studies and may indicate an acute   compression fracture. CT FACIAL BONES WO CONTRAST   Final Result   Impacted comminuted nasal bone fractures. Fluid levels in the left maxillary and left frontal sinuses. Imaging occult   fractures are not excluded but felt to be unlikely given the lack of focal   swelling in these locations. These may be reactive or related to an acute   sinus disease. CT Head WO Contrast   Final Result   No acute intracranial abnormality. No acute cervical spine abnormality. CT Cervical Spine WO Contrast   Final Result   No acute intracranial abnormality. No acute cervical spine abnormality. CT CHEST WO CONTRAST   Final Result   Mild right bibasilar dependent atelectasis. No acute traumatic intrathoracic   abnormality identified. Small scattered centrilobular nodules suggestive of hypersensitivity   pneumonitis or respiratory bronchiolitis. The ascending aorta is mildly dilated measuring 4 cm and there is   atherosclerotic calcification of the coronary arteries. CT THORACIC SPINE TRAUMA RECONSTRUCTION   Final Result   Minimal irregularity of superior endplate of T2 possibly age-indeterminate   compression fracture. Multiple thoracic spine compression fractures involving T5, T7, T11, L1, L2,   and possibly L3. Greatest height loss in the lower thoracic vertebral bodies with retropulsion   into the canal as above. XR HIP 2-3 VW W PELVIS LEFT   Final Result   No acute osseous abnormality. Given the degree of osteopenia, nondisplaced fractures may be   radiographically occult. If pain or concern for fracture persists, consider   MR imaging.              Scheduled and prn Medications:    Scheduled Meds:   LORazepam        nicotine  1 patch Transdermal Daily    calcium-vitamin D  1 tablet Oral BID    docusate sodium 100 mg Oral Daily    ferrous sulfate  324 mg Oral BID WC    magnesium oxide  400 mg Oral Daily    metoprolol succinate  25 mg Oral Daily    pantoprazole  40 mg Oral QAM AC    vitamin B-1  100 mg Oral Daily    sodium chloride flush  10 mL Intravenous 2 times per day    folic acid  1 mg Oral Daily     Continuous Infusions:   sodium chloride 100 mL/hr at 04/17/20 0517     PRN Meds:.sodium chloride, LORazepam **OR** LORazepam **OR** LORazepam **OR** LORazepam **OR** LORazepam **OR** LORazepam **OR** LORazepam **OR** LORazepam, potassium chloride **OR** potassium alternative oral replacement **OR** potassium chloride, traMADol, methocarbamol, ipratropium-albuterol, albuterol sulfate HFA, sodium chloride flush, acetaminophen **OR** acetaminophen, polyethylene glycol, promethazine **OR** ondansetron    Assessment & Plan:         Thoracic and lumbar spine compression fractures 2/2 trauma (fall)  Remote compression fxs L1 most notably and throughout lumbar spine  - no acute L spine fx, moderate spinal canal stenosis at L1 2/2 retroulsion of posterior margin at L1 vertebral body into spinal canal, mild multilevel degen changes L spine  - may benefit from TLSO brace  - pain control PRN  - muscle relaxers  - EKG: NSR without arrythmia   Neurosurgery is consulted - no intervention at this time  Essential primary hypertension  Continue patient on home medication  - working with PT/OT  - placement to SNF on discharge pending approval and neg COVID     Bronchiolitis on CT  Hx COPD  Tobacco abuse   - Nicotine patch and counseling  - duonebs PRN  - consulted Pulm -- nodules are calcified w/calcified lymph nodes 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 I/S while in bed, no intervention  - CT chest : mild right bibasilar dependent atelectasis, small scattered centrilobular nodules suggesting pneumonitis or respiratory bronchiolitis.  Ascending aorta mildly dilated measuring 4cm and atherosclerotic calcification of coronary

## 2020-04-18 NOTE — DISCHARGE SUMMARY
reasonably achievable. COMPARISON: None. HISTORY: ORDERING SYSTEM PROVIDED HISTORY: fall TECHNOLOGIST PROVIDED HISTORY: Reason for exam:->fall FINDINGS: FACIAL BONES:  Acute comminuted nasal bone fractures. Minimal displacement. Associated soft tissue swelling. Questionable fracture of the anterior nasal septum. Maxilla and zygomatic arches are normal.  Dental caries. Consider dental consultation nonemergently. ORBITS:  The retrobulbar fat is maintained. The globes are normal. SINUSES/MASTOIDS:  Fluid level in the left frontal sinus possibly from acute sinus disease. An occult frontal bone fracture is not excluded on the basis of this exam.  Nonaggressive mucosal thickening of right maxillary, left maxillary, and right sphenoid sinus. Small fluid level in the left maxillary sinus. SOFT TISSUES:  No appreciable facial soft tissue swelling is seen. Cerumen within the right external auditory canal and left external auditory canal.     Impacted comminuted nasal bone fractures. Fluid levels in the left maxillary and left frontal sinuses. Imaging occult fractures are not excluded but felt to be unlikely given the lack of focal swelling in these locations. These may be reactive or related to an acute sinus disease. Ct Chest Wo Contrast    Result Date: 4/14/2020  EXAMINATION: CT OF THE CHEST WITHOUT CONTRAST 4/14/2020 12:49 pm TECHNIQUE: CT of the chest was performed without the administration of intravenous contrast. Multiplanar reformatted images are provided for review. Dose modulation, iterative reconstruction, and/or weight based adjustment of the mA/kV was utilized to reduce the radiation dose to as low as reasonably achievable. COMPARISON: CT thoracic spine same date. HISTORY: ORDERING SYSTEM PROVIDED HISTORY: fall TECHNOLOGIST PROVIDED HISTORY: Reason for exam:->fall FINDINGS: Mediastinum: There is no evidence of mediastinal hematoma or great vessel injury on noncontrast evaluation.   There is degenerative changes of the lumbar spine, as described above. Ct Chest Abdomen Pelvis W Contrast    Result Date: 4/14/2020  EXAMINATION: CT OF THE CHEST, ABDOMEN, AND PELVIS WITH CONTRAST; CT OF THE LUMBAR SPINE WITHOUT CONTRAST 4/14/2020 3:18 pm TECHNIQUE: CT of the chest, abdomen and pelvis was performed with the administration of intravenous contrast. Multiplanar reformatted images are provided for review. Dose modulation, iterative reconstruction, and/or weight based adjustment of the mA/kV was utilized to reduce the radiation dose to as low as reasonably achievable.; CT of the lumbar spine was performed without the administration of intravenous contrast. Multiplanar reformatted images are provided for review. Dose modulation, iterative reconstruction, and/or weight based adjustment of the mA/kV was utilized to reduce the radiation dose to as low as reasonably achievable. COMPARISON: CT lumbar spine June 20, 2018 CT abdomen and pelvis July 11, 2018 HISTORY: ORDERING SYSTEM PROVIDED HISTORY: fall with multiple spine fractures TECHNOLOGIST PROVIDED HISTORY: Reason for exam:->fall with multiple spine fractures Additional Contrast?->None FINDINGS: CT CHEST: Chest wall: No axillary adenopathy. Subcutaneous lesion along the mid lower anterior chest. Mediastinum: No cardiomegaly. No pericardial effusion. Coronary artery calcifications. No adenopathy. Lungs: No pneumothorax. No evidence of pulmonary injury. No pleural effusions. No focal consolidation. Multiple scattered punctate nodules. Bones: Diffuse osteopenia limits evaluation. Multiple thoracic compression fractures as detailed on dedicated CT of the thoracic spine. Multiple remote bilateral rib fractures. Acute on chronic left 7th through 9th rib fractures. CT ABDOMEN-PELVIS: Organs: No evidence of intra-abdominal injury. No focal liver lesion. Decreased attenuation of the liver is consistent with hepatic steatosis. No gallstones.   No pancreatic adjustment of the mA/kV was utilized to reduce the radiation dose to as low as reasonably achievable. COMPARISON: CT lumbar spine June 20, 2018 CT abdomen and pelvis July 11, 2018 HISTORY: ORDERING SYSTEM PROVIDED HISTORY: fall with multiple spine fractures TECHNOLOGIST PROVIDED HISTORY: Reason for exam:->fall with multiple spine fractures Additional Contrast?->None FINDINGS: CT CHEST: Chest wall: No axillary adenopathy. Subcutaneous lesion along the mid lower anterior chest. Mediastinum: No cardiomegaly. No pericardial effusion. Coronary artery calcifications. No adenopathy. Lungs: No pneumothorax. No evidence of pulmonary injury. No pleural effusions. No focal consolidation. Multiple scattered punctate nodules. Bones: Diffuse osteopenia limits evaluation. Multiple thoracic compression fractures as detailed on dedicated CT of the thoracic spine. Multiple remote bilateral rib fractures. Acute on chronic left 7th through 9th rib fractures. CT ABDOMEN-PELVIS: Organs: No evidence of intra-abdominal injury. No focal liver lesion. Decreased attenuation of the liver is consistent with hepatic steatosis. No gallstones. No pancreatic lesion. No splenomegaly. No adrenal lesion. No hydronephrosis. No renal lesion. No renal calculus. GI/Bowel: No bowel obstruction. No acute inflammatory process. No evidence of bowel injury. Pelvis: No free fluid. No bladder calculus. Peritoneum/Retroperitoneum: Atherosclerotic calcification of the abdominal aorta without aneurysmal dilatation. No adenopathy. Bones/Soft Tissues: No acute soft tissue abnormality. Diffuse osteopenia limits evaluation. Left intertrochanteric fracture. Lucent areas remain. CT LUMBAR SPINE: Diffuse osteopenia limits evaluation. Remote L1 compression fracture. Mild remote L2 compression fracture. Mild loss of height at L5 is new from the previous exam.  Multilevel degenerative changes. No acute pulmonary process.  Acute on chronic left 7th through 9th rib fractures. Multiple thoracic spine compression fractures. Refer to dedicated CT of the thoracic spine report. No evidence of intra-abdominal injury. Left intertrochanteric fracture with lucent areas which could represent incomplete healing of previous fracture versus an acute-on-chronic fracture. Mild loss of height at L5 is new from prior studies and may indicate an acute compression fracture. Ct Thoracic Spine Trauma Reconstruction    Result Date: 4/15/2020  EXAMINATION: CT OF THE THORACIC SPINE WITHOUT CONTRAST  4/14/2020 12:49 pm: TECHNIQUE: CT of the thoracic spine was performed without the administration of intravenous contrast. Multiplanar reformatted images are provided for review. Dose modulation, iterative reconstruction, and/or weight based adjustment of the mA/kV was utilized to reduce the radiation dose to as low as reasonably achievable. COMPARISON: None. HISTORY: ORDERING SYSTEM PROVIDED HISTORY: fall with point tenderness TECHNOLOGIST PROVIDED HISTORY: Reason for exam:->fall with point tenderness FINDINGS: BONES/ALIGNMENT: Minimal concavity to the superior endplate of T2 possibly from age-indeterminate compression injury. Multiple compression fractures in the thoracic spine including T5, T7, T11, L1, L2, and possibly L3. The fractures do not extend into the posterior elements. T5 compression fracture with 30% height loss. Maximum height loss of T7 compression fracture of 63%. Maximum height loss of T11 compression fracture of just above 50%. Maximum height loss of L1 of 80% with retropulsion into the canal of 0.6 cm. Height loss of L2 of about 50%. Age-indeterminate posterior right 8th rib fracture. DEGENERATIVE CHANGES: Multilevel thoracic spine degenerative changes and facet arthropathy. SOFT TISSUES: No paraspinal mass is seen. Minimal irregularity of superior endplate of T2 possibly age-indeterminate compression fracture.  Multiple thoracic spine compression tablet  Take 1 tablet by mouth daily             Multiple Vitamins-Minerals (THERAPEUTIC MULTIVITAMIN-MINERALS) tablet  Take 1 tablet by mouth daily             omeprazole (PRILOSEC) 20 MG delayed release capsule  Take 1 capsule by mouth daily             traMADol (ULTRAM) 50 MG tablet  Take 1 tablet by mouth every 6 hours as needed for Pain for up to 3 days. vitamin B-1 (THIAMINE) 100 MG tablet  Take 1 tablet by mouth daily                 Disposition:   Discharged to Home. Any [facility] needs that were indicated and/or required as been addressed and set up by Social Work. Condition at discharge: Pt was medically stable at the time of discharge. Activity: activity as tolerated    Total time taken for discharging this patient: 40 minutes. Greater than 70% of time was spent focused exclusively on this patient. Time was taken to review chart, discuss plans with consultants, reconciling medications, discussing plan answering questions with patient. Signed:  Nelida Tomlin  4/18/2020, 9:42 AM  ----------------------------------------------------------------------------------------------------------------------    Ja Tinajero,     Please return to ER or call 911 if you develop any significant signs or symptoms.     I may not have addressed all of your medical illnesses or the abnormal blood work or imaging therefore please ask your PCP, No primary care provider on file. ,  to obtain [facility] record to follow up on all of the abnormal labs, imaging and findings that I have and have not addressed during your hospitalization.      Discharging you from the hospital does not mean that your medical care ends here and now.  You may still need additional work up, investigation, monitoring, and treatment to be handled from this point on by outside providers including your PCP, No primary care provider on file. , Specialists and other healthcare providers.      Please review your list of discharge

## 2020-04-18 NOTE — PROGRESS NOTES
Bedside introduction complete with day shift RN and patient. Patient denies any needs at this time. Update the whiteboard with RN and PCA name and number. Patient able to make needs known, using call light appropriately. Will continue to monitor and assess for needs and comfort. Patient not currently in restraints. Patient does no require restraint use at this time. Per day shift RN Enid Cruz, patient has not been in restraints at all. Restraint order was placed in case they were needed.

## 2020-04-18 NOTE — PLAN OF CARE
Problem: Falls - Risk of:  Goal: Will remain free from falls  Description: Will remain free from falls  4/15/2020 1020 by Sonny Bass RN  Outcome: Ongoing     Problem: Discharge Planning:  Goal: Discharged to appropriate level of care  Description: Discharged to appropriate level of care  4/15/2020 1020 by Sonny Bass RN  Outcome: Ongoing     Problem: Injury - Risk of, Postfracture Complications:  Goal: Absence of fat embolism  Description: Absence of fat embolism  4/15/2020 1020 by Sonny Bass RN  Outcome: Ongoing     Problem: Mobility - Impaired:  Goal: Mobility will improve to maximum level  Description: Mobility will improve to maximum level  4/15/2020 1020 by Sonny Bass RN  Outcome: Ongoing     Problem: Pain:  Goal: Pain level will decrease  Description: Pain level will decrease  4/15/2020 1020 by Sonny Bass RN  Outcome: Ongoing
Problem: Falls - Risk of:  Goal: Will remain free from falls  Description: Will remain free from falls  4/17/2020 1552 by Aron Honeycutt RN  Outcome: Ongoing  4/17/2020 0458 by Gayla García RN  Outcome: Ongoing  Goal: Absence of physical injury  Description: Absence of physical injury  4/17/2020 1552 by Aron Honeycutt RN  Outcome: Ongoing  4/17/2020 0458 by Gayla García RN  Outcome: Ongoing     Problem: Discharge Planning:  Goal: Discharged to appropriate level of care  Description: Discharged to appropriate level of care  4/17/2020 1552 by Aron Honeycutt RN  Outcome: Ongoing  4/17/2020 0458 by Gayla García RN  Outcome: Ongoing     Problem: Injury - Risk of, Postfracture Complications:  Goal: Absence of fat embolism  Description: Absence of fat embolism  4/17/2020 1552 by Aron Honeycutt RN  Outcome: Ongoing  4/17/2020 0458 by Gayla García RN  Outcome: Ongoing  Goal: Absence of compartment syndrome signs and symptoms  Description: Absence of compartment syndrome signs and symptoms  4/17/2020 1552 by Aron Honeycutt RN  Outcome: Ongoing  4/17/2020 0458 by Gayla García RN  Outcome: Ongoing     Problem: Mobility - Impaired:  Goal: Mobility will improve to maximum level  Description: Mobility will improve to maximum level  4/17/2020 1552 by Aron Honeycutt RN  Outcome: Ongoing  4/17/2020 0458 by Gayla García RN  Outcome: Ongoing     Problem: Pain:  Goal: Pain level will decrease  Description: Pain level will decrease  4/17/2020 1552 by Aron Honeycutt RN  Outcome: Ongoing  4/17/2020 0458 by Gayla García RN  Outcome: Ongoing  Goal: Control of acute pain  Description: Control of acute pain  4/17/2020 1552 by Aron Honeycutt RN  Outcome: Ongoing  4/17/2020 0458 by Gayla García RN  Outcome: Ongoing  Goal: Control of chronic pain  Description: Control of chronic pain  4/17/2020 1552 by Aron Honeycutt RN  Outcome: Ongoing  4/17/2020 0458 by Gayla García RN  Outcome: Ongoing
Problem: Falls - Risk of:  Goal: Will remain free from falls  Description: Will remain free from falls  Outcome: Ongoing  Goal: Absence of physical injury  Description: Absence of physical injury  Outcome: Ongoing     Problem: Discharge Planning:  Goal: Discharged to appropriate level of care  Description: Discharged to appropriate level of care  Outcome: Ongoing     Problem: Mobility - Impaired:  Goal: Mobility will improve to maximum level  Description: Mobility will improve to maximum level  Outcome: Ongoing     Problem: Pain:  Goal: Pain level will decrease  Description: Pain level will decrease  Outcome: Ongoing  Goal: Control of acute pain  Description: Control of acute pain  Outcome: Ongoing  Goal: Control of chronic pain  Description: Control of chronic pain  Outcome: Ongoing
Problem: Falls - Risk of:  Goal: Will remain free from falls  Description: Will remain free from falls  Outcome: Ongoing  Note: Patient educated on fall prevention. Call light is within reach, bed locked in lowest position, personal items within reach, and bed alarm is on. Will round on patient per unit guidelines. Problem: Falls - Risk of:  Goal: Absence of physical injury  Description: Absence of physical injury  Outcome: Ongoing  Note: Pt is free of injury. No injury noted. Fall precautions in place. Call light within reach. Will monitor. Problem: Discharge Planning:  Goal: Discharged to appropriate level of care  Description: Discharged to appropriate level of care  Outcome: Ongoing     Problem: Injury - Risk of, Postfracture Complications:  Goal: Absence of fat embolism  Description: Absence of fat embolism  Outcome: Ongoing     Problem: Injury - Risk of, Postfracture Complications:  Goal: Absence of compartment syndrome signs and symptoms  Description: Absence of compartment syndrome signs and symptoms  Outcome: Ongoing     Problem: Mobility - Impaired:  Goal: Mobility will improve to maximum level  Description: Mobility will improve to maximum level  Outcome: Ongoing     Problem: Pain:  Goal: Pain level will decrease  Description: Pain level will decrease  Outcome: Ongoing  Note: Pain /discomfort being managed with PRN analgesics per MD orders. Patient able to express presence and absence of pain and rate pain appropriately using numerical scale.         Problem: Pain:  Goal: Control of acute pain  Description: Control of acute pain  Outcome: Ongoing     Problem: Pain:  Goal: Control of chronic pain  Description: Control of chronic pain  Outcome: Ongoing
level  4/14/2020 2115 by Flor Styles RN  Outcome: Ongoing  Note: Patients mobility will improve    4/14/2020 1851 by Diana Isaac RN  Outcome: Ongoing  Note: Await MRI to assess need for back brace. Will have PT/OT eval.  SW following for discharge needs. Problem: Pain:  Goal: Pain level will decrease  Description: Pain level will decrease  4/14/2020 2115 by Flor Styles RN  Outcome: Ongoing  4/14/2020 1851 by Diana Isaac RN  Outcome: Ongoing  Note: Pt assessed for pain. Pt in pain and assessed with 0-10 pain rating scale. Pt declines needing anything for pain. Repositioned for comfort. Pt satisfied with pain relief thus far. Will reassess and continue to monitor. Goal: Control of acute pain  Description: Control of acute pain  4/14/2020 2115 by Flor Styles RN  Outcome: Ongoing  Note: Pt assessed for pain. Pt in pain and assessed with 0-10 pain rating scale. Pt given prescribed analgesic for pain. (See eMar) Pt satisfied with pain relief thus far. Will reassess and continue to monitor.      4/14/2020 1851 by Diana Isaac RN  Outcome: Ongoing  Goal: Control of chronic pain  Description: Control of chronic pain  4/14/2020 2115 by Flor Styles RN  Outcome: Ongoing  4/14/2020 1851 by Diana Isaac RN  Outcome: Ongoing       Electronically signed by Flor Styles RN on 4/14/2020 at 9:17 PM

## 2020-04-18 NOTE — PROGRESS NOTES
Patient A&O. Tolerating PO. Patient denies pain at this time. Call light within reach. Will continue to monitor and reassess.  Electronically signed by Leann Alberto RN on 4/18/2020

## (undated) DEVICE — Z DISCONTINUED USE 2275686 GLOVE SURG SZ 8 L12IN FNGR THK13MIL WHT ISOLEX POLYISOPRENE

## (undated) DEVICE — GUIDE WIRE, BALL-TIPPED, STERILE

## (undated) DEVICE — COVER LT HNDL BLU PLAS

## (undated) DEVICE — MERCY HEALTH WEST TURNOVER: Brand: MEDLINE INDUSTRIES, INC.

## (undated) DEVICE — CANISTER, RIGID, 1200CC: Brand: MEDLINE INDUSTRIES, INC.

## (undated) DEVICE — K-WIRE

## (undated) DEVICE — DRESSING FOAM W3XL3IN GENTLE SIL FACE BORD ADH PD SUP ABSRB

## (undated) DEVICE — SUTURE VCRL SZ 2-0 L18IN ABSRB UD CT-1 L36MM 1/2 CIR J839D

## (undated) DEVICE — ELECTRODE PT RET AD L9FT HI MOIST COND ADH HYDRGEL CORDED

## (undated) DEVICE — NEEDLE HYPO 22GA L1 1/2IN PIVOTING SHLD FOR LUERLOCK SYR

## (undated) DEVICE — 3M™ COBAN™ NL STERILE NON-LATEX SELF-ADHERENT WRAP, 2084S, 4 IN X 5 YD (10 CM X 4,5 M), 18 ROLLS/CASE: Brand: 3M™ COBAN™

## (undated) DEVICE — PAD DRY FLOOR ABS 32X58IN GRN

## (undated) DEVICE — DRILL, AO SMALL: Brand: GAMMA

## (undated) DEVICE — BANDAGE COMPR W6INXL4.5YD LTWT E EC SGL LAYERED CLP CLSR

## (undated) DEVICE — GLOVE SURG SZ 6 L12IN FNGR THK87MIL DK GRN LTX FREE ISOLEX

## (undated) DEVICE — GLOVE SURG SZ 6 L12IN FNGR THK87MIL WHT LTX FREE

## (undated) DEVICE — GOWN SIRUS NONREIN LG W/TWL: Brand: MEDLINE INDUSTRIES, INC.

## (undated) DEVICE — CHLORAPREP 26ML ORANGE

## (undated) DEVICE — SYRINGE MED 10ML LUERLOCK TIP W/O SFTY DISP

## (undated) DEVICE — GLOVE SURG SZ 8 L12IN FNGR THK87MIL WHT LTX FREE

## (undated) DEVICE — CLOSED TUBE CLIP: Brand: GAMMA

## (undated) DEVICE — PADDING UNDERCAST W4INXL4YD 100% COT CRIMPED FINISH WBRL II

## (undated) DEVICE — SUTURE VCRL SZ 0 L18IN ABSRB UD L36MM CT-1 1/2 CIR J840D

## (undated) DEVICE — IMMOBILIZER KNEE CUTAWAY 24 IN

## (undated) DEVICE — MAJOR SET UP PK

## (undated) DEVICE — SHEET,DRAPE,53X77,STERILE: Brand: MEDLINE

## (undated) DEVICE — STAPLER SKIN H3.9MM WIRE DIA0.58MM CRWN 6.9MM 35 STPL FIX

## (undated) DEVICE — DRESSING FOAM W10XL30CM ANTIMIC SELF ADH SAFETAC TECHNOLOGY

## (undated) DEVICE — PAD,ABDOMINAL,8"X7.5",STERILE,LF,1/PK: Brand: MEDLINE

## (undated) DEVICE — 6619 2 PTNT ISO SYS INCISE AREA&LT;(&GT;&&LT;)&GT;P: Brand: STERI-DRAPE™ IOBAN™ 2

## (undated) DEVICE — SOLUTION IV IRRIG POUR BRL 0.9% SODIUM CHL 2F7124

## (undated) DEVICE — GOWN SIRUS NONREIN XL W/TWL: Brand: MEDLINE INDUSTRIES, INC.